# Patient Record
Sex: MALE | Race: WHITE | Employment: OTHER | URBAN - NONMETROPOLITAN AREA
[De-identification: names, ages, dates, MRNs, and addresses within clinical notes are randomized per-mention and may not be internally consistent; named-entity substitution may affect disease eponyms.]

---

## 2017-01-04 RX ORDER — TRAZODONE HYDROCHLORIDE 100 MG/1
TABLET ORAL
Qty: 30 TABLET | Refills: 1 | Status: SHIPPED | OUTPATIENT
Start: 2017-01-04 | End: 2017-02-20

## 2017-02-20 ENCOUNTER — LAB ENCOUNTER (OUTPATIENT)
Dept: LAB | Age: 52
End: 2017-02-20
Attending: FAMILY MEDICINE
Payer: COMMERCIAL

## 2017-02-20 ENCOUNTER — OFFICE VISIT (OUTPATIENT)
Dept: FAMILY MEDICINE CLINIC | Facility: CLINIC | Age: 52
End: 2017-02-20

## 2017-02-20 VITALS
SYSTOLIC BLOOD PRESSURE: 138 MMHG | OXYGEN SATURATION: 98 % | TEMPERATURE: 98 F | WEIGHT: 254 LBS | DIASTOLIC BLOOD PRESSURE: 80 MMHG | HEART RATE: 76 BPM | HEIGHT: 72.5 IN | BODY MASS INDEX: 34.03 KG/M2

## 2017-02-20 DIAGNOSIS — Z00.00 LABORATORY EXAM ORDERED AS PART OF ROUTINE GENERAL MEDICAL EXAMINATION: ICD-10-CM

## 2017-02-20 DIAGNOSIS — G47.26 SHIFT WORK SLEEP DISORDER: ICD-10-CM

## 2017-02-20 DIAGNOSIS — Z00.00 ROUTINE GENERAL MEDICAL EXAMINATION AT A HEALTH CARE FACILITY: Primary | ICD-10-CM

## 2017-02-20 DIAGNOSIS — Z13.0 SCREENING, ANEMIA, DEFICIENCY, IRON: ICD-10-CM

## 2017-02-20 DIAGNOSIS — Z12.11 SCREEN FOR COLON CANCER: ICD-10-CM

## 2017-02-20 DIAGNOSIS — Z13.1 SCREENING FOR DIABETES MELLITUS: ICD-10-CM

## 2017-02-20 DIAGNOSIS — R73.09 ELEVATED GLUCOSE LEVEL: ICD-10-CM

## 2017-02-20 DIAGNOSIS — Z13.220 SCREENING, LIPID: ICD-10-CM

## 2017-02-20 DIAGNOSIS — I10 ESSENTIAL HYPERTENSION: ICD-10-CM

## 2017-02-20 DIAGNOSIS — N48.6 PEYRONIE'S SYNDROME: ICD-10-CM

## 2017-02-20 DIAGNOSIS — E66.9 OBESITY (BMI 30.0-34.9): ICD-10-CM

## 2017-02-20 PROBLEM — E66.811 OBESITY (BMI 30.0-34.9): Status: ACTIVE | Noted: 2017-02-20

## 2017-02-20 LAB
25-HYDROXYVITAMIN D (TOTAL): 14.2 NG/ML (ref 30–100)
ALBUMIN SERPL-MCNC: 4.3 G/DL (ref 3.5–4.8)
ALP LIVER SERPL-CCNC: 76 U/L (ref 45–117)
ALT SERPL-CCNC: 34 U/L (ref 17–63)
AST SERPL-CCNC: 19 U/L (ref 15–41)
BASOPHILS # BLD AUTO: 0.05 X10(3) UL (ref 0–0.1)
BASOPHILS NFR BLD AUTO: 0.8 %
BILIRUB SERPL-MCNC: 0.6 MG/DL (ref 0.1–2)
BUN BLD-MCNC: 16 MG/DL (ref 8–20)
CALCIUM BLD-MCNC: 9.7 MG/DL (ref 8.3–10.3)
CHLORIDE: 102 MMOL/L (ref 101–111)
CHOLEST SMN-MCNC: 191 MG/DL (ref ?–200)
CO2: 31 MMOL/L (ref 22–32)
CREAT BLD-MCNC: 0.92 MG/DL (ref 0.7–1.3)
EOSINOPHIL # BLD AUTO: 0.49 X10(3) UL (ref 0–0.3)
EOSINOPHIL NFR BLD AUTO: 7.7 %
ERYTHROCYTE [DISTWIDTH] IN BLOOD BY AUTOMATED COUNT: 12.3 % (ref 11.5–16)
GLUCOSE BLD-MCNC: 123 MG/DL (ref 70–99)
HCT VFR BLD AUTO: 45.4 % (ref 37–53)
HDLC SERPL-MCNC: 55 MG/DL (ref 45–?)
HDLC SERPL: 3.47 {RATIO} (ref ?–4.97)
HGB BLD-MCNC: 15.3 G/DL (ref 13–17)
IMMATURE GRANULOCYTE COUNT: 0.02 X10(3) UL (ref 0–1)
IMMATURE GRANULOCYTE RATIO %: 0.3 %
LDLC SERPL CALC-MCNC: 96 MG/DL (ref ?–130)
LYMPHOCYTES # BLD AUTO: 1.69 X10(3) UL (ref 0.9–4)
LYMPHOCYTES NFR BLD AUTO: 26.4 %
M PROTEIN MFR SERPL ELPH: 7.5 G/DL (ref 6.1–8.3)
MCH RBC QN AUTO: 29.1 PG (ref 27–33.2)
MCHC RBC AUTO-ENTMCNC: 33.7 G/DL (ref 31–37)
MCV RBC AUTO: 86.5 FL (ref 80–99)
MONOCYTES # BLD AUTO: 0.56 X10(3) UL (ref 0.1–0.6)
MONOCYTES NFR BLD AUTO: 8.8 %
NEUTROPHIL ABS PRELIM: 3.59 X10 (3) UL (ref 1.3–6.7)
NEUTROPHILS # BLD AUTO: 3.59 X10(3) UL (ref 1.3–6.7)
NEUTROPHILS NFR BLD AUTO: 56 %
NONHDLC SERPL-MCNC: 136 MG/DL (ref ?–130)
PLATELET # BLD AUTO: 184 10(3)UL (ref 150–450)
POTASSIUM SERPL-SCNC: 3.6 MMOL/L (ref 3.6–5.1)
RBC # BLD AUTO: 5.25 X10(6)UL (ref 4.3–5.7)
RED CELL DISTRIBUTION WIDTH-SD: 38.7 FL (ref 35.1–46.3)
SODIUM SERPL-SCNC: 140 MMOL/L (ref 136–144)
TRIGLYCERIDES: 198 MG/DL (ref ?–150)
VLDL: 40 MG/DL (ref 5–40)
WBC # BLD AUTO: 6.4 X10(3) UL (ref 4–13)

## 2017-02-20 PROCEDURE — 83036 HEMOGLOBIN GLYCOSYLATED A1C: CPT

## 2017-02-20 PROCEDURE — 36415 COLL VENOUS BLD VENIPUNCTURE: CPT

## 2017-02-20 PROCEDURE — 82306 VITAMIN D 25 HYDROXY: CPT

## 2017-02-20 PROCEDURE — 85025 COMPLETE CBC W/AUTO DIFF WBC: CPT

## 2017-02-20 PROCEDURE — 80053 COMPREHEN METABOLIC PANEL: CPT

## 2017-02-20 PROCEDURE — 99396 PREV VISIT EST AGE 40-64: CPT | Performed by: FAMILY MEDICINE

## 2017-02-20 PROCEDURE — 80061 LIPID PANEL: CPT

## 2017-02-20 RX ORDER — TRAZODONE HYDROCHLORIDE 100 MG/1
100 TABLET ORAL NIGHTLY
Qty: 30 TABLET | Refills: 5 | Status: SHIPPED | OUTPATIENT
Start: 2017-02-20 | End: 2017-08-01

## 2017-02-20 RX ORDER — LOSARTAN POTASSIUM 50 MG/1
50 TABLET ORAL DAILY
Qty: 30 TABLET | Refills: 5 | Status: SHIPPED | OUTPATIENT
Start: 2017-02-20 | End: 2017-08-17

## 2017-02-20 NOTE — PROGRESS NOTES
Marissa Lai is a 50year old male. CC:  No chief complaint on file. HPI:  Patient here for routine physical.    Patient states that he has difficulty losing weight and is aware that he needs to. He will attempt by dieting and exercise. Encounters:  02/20/17 : 254 lb  11/22/16 : 257 lb 6 oz  02/22/16 : 249 lb  06/04/15 : 265 lb 4 oz  05/29/15 : 264 lb 8 oz  03/11/15 : 267 lb      BP Readings from Last 3 Encounters:  02/20/17 : 138/80  11/22/16 : 120/80  09/09/16 : 130/80    REVIEW OF SYST significant medication side effects noted.    PLAN: will continue present medications, reviewed diet, exercise and weight control, and labs as ordered           Relevant Medications    Losartan Potassium 50 MG Oral Tab       Neurologic    Shift work sleep d Future    Screening, anemia, deficiency, iron  -     CBC With Diff; Future    Shift work sleep disorder  -     TraZODone HCl 100 MG Oral Tab; Take 1 tablet (100 mg total) by mouth nightly.           Meds & Refills for this Visit:  Signed Prescriptions Disp

## 2017-02-20 NOTE — PROGRESS NOTES
HPI:    Patient ID: Brady Solis is a 50year old male. HPI    Review of Systems         Current Outpatient Prescriptions:  Multiple Vitamins-Minerals (MULTIVITAL) Oral Tab Take 1 tablet by mouth daily.  Disp:  Rfl:    TRAZODONE  MG Oral Ta

## 2017-02-20 NOTE — ASSESSMENT & PLAN NOTE
As for his hypertension, Blood Pressure is well controlled, no significant medication side effects noted.    PLAN: will continue present medications, reviewed diet, exercise and weight control, and labs as ordered

## 2017-02-20 NOTE — PATIENT INSTRUCTIONS
SCREEN COVERAGE SCHEDULE  (If Indicated)   Lipids All Patients q5 years   Colonoscopy All Patients q10 years   FBS All Patients q1 year   Glaucoma If at high risk q1 year   PSA/exam Age > 52 Only if hi risk   EKG All Patients One at initial exam   US for A

## 2017-02-22 DIAGNOSIS — E66.9 OBESITY (BMI 30.0-34.9): ICD-10-CM

## 2017-02-22 DIAGNOSIS — I10 ESSENTIAL HYPERTENSION: ICD-10-CM

## 2017-02-22 DIAGNOSIS — E55.9 VITAMIN D DEFICIENCY: ICD-10-CM

## 2017-02-22 DIAGNOSIS — Z13.0 SCREENING FOR DEFICIENCY ANEMIA: ICD-10-CM

## 2017-02-22 DIAGNOSIS — R73.09 ELEVATED GLUCOSE LEVEL: Primary | ICD-10-CM

## 2017-02-22 LAB
EST. AVERAGE GLUCOSE BLD GHB EST-MCNC: 114 MG/DL (ref 68–126)
HBA1C MFR BLD HPLC: 5.6 % (ref ?–5.7)

## 2017-02-24 RX ORDER — TRAZODONE HYDROCHLORIDE 100 MG/1
TABLET ORAL
Qty: 30 TABLET | Refills: 0 | OUTPATIENT
Start: 2017-02-24

## 2017-03-08 ENCOUNTER — MED REC SCAN ONLY (OUTPATIENT)
Dept: FAMILY MEDICINE CLINIC | Facility: CLINIC | Age: 52
End: 2017-03-08

## 2017-03-21 RX ORDER — LOSARTAN POTASSIUM AND HYDROCHLOROTHIAZIDE 12.5; 5 MG/1; MG/1
TABLET ORAL
Qty: 30 TABLET | Refills: 0 | OUTPATIENT
Start: 2017-03-21

## 2017-03-21 NOTE — TELEPHONE ENCOUNTER
Last OV-2/20/17  Last BP-2/20/17    138/80  Last cmp-2/20/17  Last Refill of Losartan 50mg--2/20/17    #30 with 5 refills    Calling pt. Left detailed message on voicemail for pt that there is 5 refills for his losartan/hctz at the pharmacy.   I will deny

## 2017-04-04 PROBLEM — D12.6 TUBULAR ADENOMA OF COLON: Status: ACTIVE | Noted: 2017-04-04

## 2017-04-05 ENCOUNTER — MED REC SCAN ONLY (OUTPATIENT)
Dept: FAMILY MEDICINE CLINIC | Facility: CLINIC | Age: 52
End: 2017-04-05

## 2017-05-03 ENCOUNTER — OFFICE VISIT (OUTPATIENT)
Dept: FAMILY MEDICINE CLINIC | Facility: CLINIC | Age: 52
End: 2017-05-03

## 2017-05-03 VITALS
BODY MASS INDEX: 32 KG/M2 | WEIGHT: 242.25 LBS | TEMPERATURE: 97 F | SYSTOLIC BLOOD PRESSURE: 116 MMHG | DIASTOLIC BLOOD PRESSURE: 70 MMHG

## 2017-05-03 DIAGNOSIS — R06.2 WHEEZE: ICD-10-CM

## 2017-05-03 DIAGNOSIS — J01.90 ACUTE RHINOSINUSITIS: Primary | ICD-10-CM

## 2017-05-03 PROCEDURE — 99213 OFFICE O/P EST LOW 20 MIN: CPT | Performed by: FAMILY MEDICINE

## 2017-05-03 RX ORDER — ALBUTEROL SULFATE 2.5 MG/3ML
2.5 SOLUTION RESPIRATORY (INHALATION) EVERY 4 HOURS PRN
Qty: 75 ML | Refills: 3 | Status: SHIPPED | OUTPATIENT
Start: 2017-05-03 | End: 2018-10-01

## 2017-05-03 RX ORDER — FLUTICASONE PROPIONATE 250 UG/1
POWDER, METERED RESPIRATORY (INHALATION)
Refills: 0 | COMMUNITY
Start: 2017-04-17 | End: 2017-08-10

## 2017-05-03 RX ORDER — ALBUTEROL SULFATE 90 UG/1
2 AEROSOL, METERED RESPIRATORY (INHALATION) EVERY 6 HOURS PRN
Qty: 1 INHALER | Refills: 2 | Status: SHIPPED | OUTPATIENT
Start: 2017-05-03 | End: 2017-07-24

## 2017-05-03 RX ORDER — AMOXICILLIN AND CLAVULANATE POTASSIUM 875; 125 MG/1; MG/1
1 TABLET, FILM COATED ORAL 2 TIMES DAILY
Qty: 20 TABLET | Refills: 0 | Status: SHIPPED | OUTPATIENT
Start: 2017-05-03 | End: 2017-05-11

## 2017-05-03 NOTE — PROGRESS NOTES
HPI:   Terri Snellen is a 50year old male who presents for upper respiratory symptoms for  3  days.  Patient reports sore throat, congestion, dry cough, sinus pain, wheezing, OTC cold meds have not been helping, prior history of sinusitis, illness ex denies shortness of breath with exertion  CARDIOVASCULAR: denies chest pain on exertion  GI: no nausea or abdominal pain  NEURO: denies headaches    EXAM:   /70 mmHg  Temp(Src) 97.1 °F (36.2 °C) (Temporal)  Wt 242 lb 4 oz  GENERAL: well developed, we improve.     Marissa Walter M.D., FAAFP

## 2017-05-04 ENCOUNTER — TELEPHONE (OUTPATIENT)
Dept: FAMILY MEDICINE CLINIC | Facility: CLINIC | Age: 52
End: 2017-05-04

## 2017-05-09 ENCOUNTER — OFFICE VISIT (OUTPATIENT)
Dept: FAMILY MEDICINE CLINIC | Facility: CLINIC | Age: 52
End: 2017-05-09

## 2017-05-09 VITALS
DIASTOLIC BLOOD PRESSURE: 72 MMHG | HEART RATE: 70 BPM | BODY MASS INDEX: 33 KG/M2 | TEMPERATURE: 98 F | WEIGHT: 243.5 LBS | OXYGEN SATURATION: 97 % | SYSTOLIC BLOOD PRESSURE: 118 MMHG

## 2017-05-09 DIAGNOSIS — R05.9 COUGH: Primary | ICD-10-CM

## 2017-05-09 DIAGNOSIS — J01.90 ACUTE RHINOSINUSITIS: ICD-10-CM

## 2017-05-09 DIAGNOSIS — R06.2 WHEEZE: ICD-10-CM

## 2017-05-09 PROCEDURE — 99214 OFFICE O/P EST MOD 30 MIN: CPT | Performed by: FAMILY MEDICINE

## 2017-05-09 RX ORDER — PREDNISONE 20 MG/1
TABLET ORAL
Qty: 42 TABLET | Refills: 0 | Status: SHIPPED | OUTPATIENT
Start: 2017-05-09 | End: 2017-08-10 | Stop reason: ALTCHOICE

## 2017-05-09 NOTE — PROGRESS NOTES
HPI:   Jane Gee is a 50year old male who presents for upper respiratory symptoms for  6  days.  Patient reports sore throat, congestion, low grade fever, dry cough, cough is keeping pt up at night, chest pain from coughing, OTC cold meds have no Use: Yes           3.0 oz/week       5 Cans of beer per week       Comment: rare        REVIEW OF SYSTEMS:   GENERAL: feels well otherwise  SKIN: no rashes  EYES:denies blurred vision or double vision  HEENT: congested  LUNGS: denies shortness of breath wi

## 2017-05-09 NOTE — PATIENT INSTRUCTIONS
Prednisone tablets  What is this medicine? PREDNISONE (PRED ni sone) is a corticosteroid. It is commonly used to treat inflammation of the skin, joints, lungs, and other organs. Common conditions treated include asthma, allergies, and arthritis.  It is a · aminoglutethimide  · amphotericin B  · aspirin and aspirin-like medicines  · barbiturates  · certain medicines for diabetes, like glipizide or glyburide  · cholestyramine  · cholinesterase inhibitors  · cyclosporine  · digoxin  · diuretics  · ephedrine This medicine may increase your risk of getting an infection. Tell your doctor or health care professional if you are around anyone with measles or chickenpox, or if you develop sores or blisters that do not heal properly.   If you are going to have surgery

## 2017-05-10 ENCOUNTER — TELEPHONE (OUTPATIENT)
Dept: FAMILY MEDICINE CLINIC | Facility: CLINIC | Age: 52
End: 2017-05-10

## 2017-05-10 RX ORDER — SULFAMETHOXAZOLE AND TRIMETHOPRIM 800; 160 MG/1; MG/1
1 TABLET ORAL 2 TIMES DAILY
Qty: 20 TABLET | Refills: 0 | Status: SHIPPED | OUTPATIENT
Start: 2017-05-10 | End: 2017-05-11

## 2017-05-10 NOTE — TELEPHONE ENCOUNTER
These were both filled 5/3. The antibiotic for 10 days, the neb solution #75mL with 3 refills. Spoke with patient. Was given Prednisone and will run out of the antibiotic before the Prednisone is gone, so wasn't sure if he would need more.   Still has

## 2017-05-11 ENCOUNTER — HOSPITAL ENCOUNTER (OUTPATIENT)
Age: 52
Discharge: HOME | End: 2017-05-11
Attending: FAMILY MEDICINE
Payer: COMMERCIAL

## 2017-05-11 ENCOUNTER — TELEPHONE (OUTPATIENT)
Dept: FAMILY MEDICINE CLINIC | Facility: CLINIC | Age: 52
End: 2017-05-11

## 2017-05-11 VITALS
OXYGEN SATURATION: 97 % | HEART RATE: 83 BPM | RESPIRATION RATE: 18 BRPM | TEMPERATURE: 97 F | SYSTOLIC BLOOD PRESSURE: 105 MMHG | DIASTOLIC BLOOD PRESSURE: 67 MMHG

## 2017-05-11 DIAGNOSIS — J01.90 ACUTE SINUSITIS, RECURRENCE NOT SPECIFIED, UNSPECIFIED LOCATION: Primary | ICD-10-CM

## 2017-05-11 DIAGNOSIS — J20.9 ACUTE BRONCHITIS, UNSPECIFIED ORGANISM: ICD-10-CM

## 2017-05-11 DIAGNOSIS — J01.90 ACUTE RHINOSINUSITIS: Primary | ICD-10-CM

## 2017-05-11 PROCEDURE — 99204 OFFICE O/P NEW MOD 45 MIN: CPT

## 2017-05-11 PROCEDURE — 94640 AIRWAY INHALATION TREATMENT: CPT

## 2017-05-11 PROCEDURE — 99214 OFFICE O/P EST MOD 30 MIN: CPT

## 2017-05-11 PROCEDURE — 96372 THER/PROPH/DIAG INJ SC/IM: CPT

## 2017-05-11 RX ORDER — AMOXICILLIN AND CLAVULANATE POTASSIUM 875; 125 MG/1; MG/1
1 TABLET, FILM COATED ORAL 2 TIMES DAILY
Qty: 20 TABLET | Refills: 0 | Status: SHIPPED | OUTPATIENT
Start: 2017-05-11 | End: 2017-05-11 | Stop reason: ALTCHOICE

## 2017-05-11 RX ORDER — METHYLPREDNISOLONE SODIUM SUCCINATE 125 MG/2ML
125 INJECTION, POWDER, LYOPHILIZED, FOR SOLUTION INTRAMUSCULAR; INTRAVENOUS ONCE
Status: COMPLETED | OUTPATIENT
Start: 2017-05-11 | End: 2017-05-11

## 2017-05-11 RX ORDER — LEVOFLOXACIN 750 MG/1
750 TABLET ORAL DAILY
Qty: 5 TABLET | Refills: 0 | Status: SHIPPED | OUTPATIENT
Start: 2017-05-11 | End: 2017-05-16

## 2017-05-11 RX ORDER — IPRATROPIUM BROMIDE AND ALBUTEROL SULFATE 2.5; .5 MG/3ML; MG/3ML
3 SOLUTION RESPIRATORY (INHALATION) ONCE
Status: COMPLETED | OUTPATIENT
Start: 2017-05-11 | End: 2017-05-11

## 2017-05-11 NOTE — TELEPHONE ENCOUNTER
Spoke to patient. Took one dose of the Bactrim DS. Had hard time swallowing and felt flushed. Didn't take anymore doses. Advised to stay off the Bactrim. Will refill the Amox. Patient verbalizes understanding.   States he also needs new work note to i

## 2017-05-11 NOTE — ED PROVIDER NOTES
[place] [address] [facility] [place]   Patient presents with:  Cough/URI    Stated Complaint: nasal congestion, URI, dx: bronchitis    HPI    62-year-old male who presents to the clinic today with one-week history of sinus pressure needed for Wheezing. Multiple Vitamins-Minerals (MULTIVITAL) Oral Tab,  Take 1 tablet by mouth daily. Losartan Potassium 50 MG Oral Tab,  Take 1 tablet (50 mg total) by mouth daily.    TraZODone HCl 100 MG Oral Tab,  Take 1 tablet (100 mg total) by mout patent. No exudates noted. Posterior wall cobblestoning noted. Lips, mucosa, and tongue normal; teeth and gums normal  Neck: no anterior or posterior cervical adenopathy and supple, symmetrical, trachea midline  Back: symmetric, no curvature.  ROM normal

## 2017-07-24 DIAGNOSIS — R06.2 WHEEZE: ICD-10-CM

## 2017-08-01 DIAGNOSIS — G47.26 SHIFT WORK SLEEP DISORDER: ICD-10-CM

## 2017-08-01 RX ORDER — TRAZODONE HYDROCHLORIDE 100 MG/1
TABLET ORAL
Qty: 30 TABLET | Refills: 4 | Status: SHIPPED | OUTPATIENT
Start: 2017-08-01 | End: 2017-08-21

## 2017-08-10 ENCOUNTER — OFFICE VISIT (OUTPATIENT)
Dept: FAMILY MEDICINE CLINIC | Facility: CLINIC | Age: 52
End: 2017-08-10

## 2017-08-10 ENCOUNTER — TELEPHONE (OUTPATIENT)
Dept: FAMILY MEDICINE CLINIC | Facility: CLINIC | Age: 52
End: 2017-08-10

## 2017-08-10 VITALS
BODY MASS INDEX: 33 KG/M2 | HEIGHT: 73 IN | SYSTOLIC BLOOD PRESSURE: 116 MMHG | DIASTOLIC BLOOD PRESSURE: 70 MMHG | WEIGHT: 249 LBS | HEART RATE: 79 BPM | TEMPERATURE: 98 F | OXYGEN SATURATION: 98 %

## 2017-08-10 DIAGNOSIS — J01.90 ACUTE RHINOSINUSITIS: Primary | ICD-10-CM

## 2017-08-10 DIAGNOSIS — R05.9 COUGH: ICD-10-CM

## 2017-08-10 PROCEDURE — 99213 OFFICE O/P EST LOW 20 MIN: CPT | Performed by: FAMILY MEDICINE

## 2017-08-10 RX ORDER — PREDNISONE 20 MG/1
TABLET ORAL
Qty: 30 TABLET | Refills: 0 | Status: SHIPPED | OUTPATIENT
Start: 2017-08-10 | End: 2017-08-21

## 2017-08-10 RX ORDER — LEVOFLOXACIN 500 MG/1
500 TABLET, FILM COATED ORAL DAILY
Qty: 10 TABLET | Refills: 0 | Status: SHIPPED | OUTPATIENT
Start: 2017-08-10 | End: 2017-08-20

## 2017-08-10 NOTE — TELEPHONE ENCOUNTER
Stacia Carrillo for work in per Dr. Sharif Has , patient notified and scheduled.        Future Appointments  Date Time Provider Millicent Victoria   8/10/2017 9:40 AM Gleason, Emmie Runner, MD [facility] [place]

## 2017-08-10 NOTE — PROGRESS NOTES
HPI:   Errol Rivera is a 50year old male who presents for upper respiratory symptoms for  3  days.  Patient reports sore throat, congestion, dry cough, sinus pain, wheezing, OTC cold meds have not been helping, denies fever     Recent flight as well REVIEW OF SYSTEMS:   GENERAL: feels well otherwise  SKIN: no rashes  EYES:denies blurred vision or double vision  HEENT: congested  LUNGS: denies shortness of breath with exertion  Uses inhaler  CARDIOVASCULAR: denies chest pain on exertion  GI: no rodrick

## 2017-08-17 DIAGNOSIS — I10 ESSENTIAL HYPERTENSION: ICD-10-CM

## 2017-08-17 RX ORDER — LOSARTAN POTASSIUM 50 MG/1
TABLET ORAL
Qty: 30 TABLET | Refills: 5 | Status: SHIPPED | OUTPATIENT
Start: 2017-08-17 | End: 2017-08-21

## 2017-08-21 ENCOUNTER — APPOINTMENT (OUTPATIENT)
Dept: CT IMAGING | Age: 52
End: 2017-08-21
Attending: FAMILY MEDICINE
Payer: COMMERCIAL

## 2017-08-21 ENCOUNTER — HOSPITAL ENCOUNTER (OUTPATIENT)
Age: 52
Discharge: ANOTHER HOSPITAL | End: 2017-08-21
Attending: FAMILY MEDICINE
Payer: COMMERCIAL

## 2017-08-21 ENCOUNTER — HOSPITAL ENCOUNTER (INPATIENT)
Facility: HOSPITAL | Age: 52
LOS: 4 days | Discharge: HOME | DRG: 392 | End: 2017-08-25
Attending: EMERGENCY MEDICINE | Admitting: HOSPITALIST
Payer: COMMERCIAL

## 2017-08-21 VITALS
BODY MASS INDEX: 33.13 KG/M2 | WEIGHT: 250 LBS | HEART RATE: 89 BPM | HEIGHT: 73 IN | TEMPERATURE: 101 F | OXYGEN SATURATION: 99 % | RESPIRATION RATE: 18 BRPM | SYSTOLIC BLOOD PRESSURE: 145 MMHG | DIASTOLIC BLOOD PRESSURE: 73 MMHG

## 2017-08-21 DIAGNOSIS — K57.20 DIVERTICULITIS OF LARGE INTESTINE WITH PERFORATION WITHOUT BLEEDING: Primary | ICD-10-CM

## 2017-08-21 DIAGNOSIS — K57.92 ACUTE DIVERTICULITIS: Primary | ICD-10-CM

## 2017-08-21 LAB
#LYMPHOCYTE IC: 1.5 X10ˆ3/UL (ref 0.9–3.2)
#MXD IC: 1.5 X10ˆ3/UL (ref 0.1–1)
#NEUTROPHIL IC: 16.2 X10ˆ3/UL (ref 1.3–6.7)
CREAT SERPL-MCNC: 0.9 MG/DL (ref 0.7–1.2)
GLUCOSE BLD-MCNC: 133 MG/DL (ref 65–99)
HCT IC: 45.8 % (ref 37–54)
HGB IC: 15.4 G/DL (ref 13–17)
ISTAT BLOOD GAS TCO2: 27 MMOL/L (ref 22–32)
ISTAT BUN: 10 MG/DL (ref 8–20)
ISTAT CHLORIDE: 98 MMOL/L (ref 101–111)
ISTAT HEMATOCRIT: 46 % (ref 37–54)
ISTAT IONIZED CALCIUM: 1.14 MMOL/L (ref 1.12–1.32)
ISTAT POTASSIUM: 3.9 MMOL/L (ref 3.6–5.1)
ISTAT SODIUM: 137 MMOL/L (ref 136–144)
LYMPHOCYTES NFR BLD AUTO: 7.9 %
MCH IC: 28.9 PG (ref 27–33.2)
MCHC IC: 33.6 G/DL (ref 31–37)
MCV IC: 86.1 FL (ref 80–99)
MIXED CELL %: 7.7 %
NEUTROPHILS NFR BLD AUTO: 84.4 %
PLT IC: 192 X10ˆ3/UL (ref 150–450)
RBC IC: 5.32 X10ˆ6/UL (ref 4.3–5.7)
WBC IC: 19.2 X10ˆ3/UL (ref 4–13)

## 2017-08-21 PROCEDURE — 99215 OFFICE O/P EST HI 40 MIN: CPT

## 2017-08-21 PROCEDURE — 96360 HYDRATION IV INFUSION INIT: CPT

## 2017-08-21 PROCEDURE — 74177 CT ABD & PELVIS W/CONTRAST: CPT | Performed by: FAMILY MEDICINE

## 2017-08-21 PROCEDURE — 85025 COMPLETE CBC W/AUTO DIFF WBC: CPT | Performed by: FAMILY MEDICINE

## 2017-08-21 PROCEDURE — 81002 URINALYSIS NONAUTO W/O SCOPE: CPT | Performed by: FAMILY MEDICINE

## 2017-08-21 PROCEDURE — 80047 BASIC METABLC PNL IONIZED CA: CPT

## 2017-08-21 PROCEDURE — 99223 1ST HOSP IP/OBS HIGH 75: CPT | Performed by: HOSPITALIST

## 2017-08-21 RX ORDER — HYDROMORPHONE HYDROCHLORIDE 1 MG/ML
INJECTION, SOLUTION INTRAMUSCULAR; INTRAVENOUS; SUBCUTANEOUS
Status: COMPLETED
Start: 2017-08-21 | End: 2017-08-21

## 2017-08-21 RX ORDER — HYDROMORPHONE HYDROCHLORIDE 1 MG/ML
0.2 INJECTION, SOLUTION INTRAMUSCULAR; INTRAVENOUS; SUBCUTANEOUS EVERY 2 HOUR PRN
Status: DISCONTINUED | OUTPATIENT
Start: 2017-08-21 | End: 2017-08-25

## 2017-08-21 RX ORDER — TRAZODONE HYDROCHLORIDE 100 MG/1
100 TABLET ORAL NIGHTLY
COMMUNITY
End: 2018-02-01

## 2017-08-21 RX ORDER — FAMOTIDINE 10 MG/ML
20 INJECTION, SOLUTION INTRAVENOUS 2 TIMES DAILY
Status: DISCONTINUED | OUTPATIENT
Start: 2017-08-21 | End: 2017-08-25

## 2017-08-21 RX ORDER — CHLORAL HYDRATE 500 MG
1000 CAPSULE ORAL DAILY
Status: ON HOLD | COMMUNITY
End: 2017-08-25

## 2017-08-21 RX ORDER — ONDANSETRON 2 MG/ML
4 INJECTION INTRAMUSCULAR; INTRAVENOUS EVERY 6 HOURS PRN
Status: DISCONTINUED | OUTPATIENT
Start: 2017-08-21 | End: 2017-08-25

## 2017-08-21 RX ORDER — SODIUM CHLORIDE 9 MG/ML
1000 INJECTION, SOLUTION INTRAVENOUS ONCE
Status: COMPLETED | OUTPATIENT
Start: 2017-08-21 | End: 2017-08-21

## 2017-08-21 RX ORDER — HYDROMORPHONE HYDROCHLORIDE 1 MG/ML
0.8 INJECTION, SOLUTION INTRAMUSCULAR; INTRAVENOUS; SUBCUTANEOUS EVERY 2 HOUR PRN
Status: DISCONTINUED | OUTPATIENT
Start: 2017-08-21 | End: 2017-08-25

## 2017-08-21 RX ORDER — SODIUM CHLORIDE 9 MG/ML
INJECTION, SOLUTION INTRAVENOUS CONTINUOUS
Status: ACTIVE | OUTPATIENT
Start: 2017-08-21 | End: 2017-08-21

## 2017-08-21 RX ORDER — ONDANSETRON 2 MG/ML
4 INJECTION INTRAMUSCULAR; INTRAVENOUS EVERY 4 HOURS PRN
Status: DISCONTINUED | OUTPATIENT
Start: 2017-08-21 | End: 2017-08-21

## 2017-08-21 RX ORDER — HYDROMORPHONE HYDROCHLORIDE 1 MG/ML
0.5 INJECTION, SOLUTION INTRAMUSCULAR; INTRAVENOUS; SUBCUTANEOUS EVERY 30 MIN PRN
Status: ACTIVE | OUTPATIENT
Start: 2017-08-21 | End: 2017-08-21

## 2017-08-21 RX ORDER — LOSARTAN POTASSIUM 50 MG/1
50 TABLET ORAL DAILY
Status: ON HOLD | COMMUNITY
End: 2017-08-25

## 2017-08-21 RX ORDER — HYDROMORPHONE HYDROCHLORIDE 1 MG/ML
1 INJECTION, SOLUTION INTRAMUSCULAR; INTRAVENOUS; SUBCUTANEOUS EVERY 2 HOUR PRN
Status: DISCONTINUED | OUTPATIENT
Start: 2017-08-21 | End: 2017-08-21

## 2017-08-21 RX ORDER — HYDROMORPHONE HYDROCHLORIDE 1 MG/ML
0.5 INJECTION, SOLUTION INTRAMUSCULAR; INTRAVENOUS; SUBCUTANEOUS EVERY 2 HOUR PRN
Status: DISCONTINUED | OUTPATIENT
Start: 2017-08-21 | End: 2017-08-21

## 2017-08-21 RX ORDER — METRONIDAZOLE 500 MG/100ML
500 INJECTION, SOLUTION INTRAVENOUS EVERY 8 HOURS
Status: DISCONTINUED | OUTPATIENT
Start: 2017-08-21 | End: 2017-08-23

## 2017-08-21 RX ORDER — HYDROMORPHONE HYDROCHLORIDE 1 MG/ML
0.4 INJECTION, SOLUTION INTRAMUSCULAR; INTRAVENOUS; SUBCUTANEOUS EVERY 2 HOUR PRN
Status: DISCONTINUED | OUTPATIENT
Start: 2017-08-21 | End: 2017-08-25

## 2017-08-21 RX ORDER — SODIUM CHLORIDE 9 MG/ML
INJECTION, SOLUTION INTRAVENOUS CONTINUOUS
Status: DISCONTINUED | OUTPATIENT
Start: 2017-08-21 | End: 2017-08-25

## 2017-08-21 RX ORDER — HYDROMORPHONE HYDROCHLORIDE 1 MG/ML
1 INJECTION, SOLUTION INTRAMUSCULAR; INTRAVENOUS; SUBCUTANEOUS ONCE
Status: COMPLETED | OUTPATIENT
Start: 2017-08-21 | End: 2017-08-21

## 2017-08-21 RX ORDER — ONDANSETRON 2 MG/ML
4 INJECTION INTRAMUSCULAR; INTRAVENOUS EVERY 6 HOURS PRN
Status: DISCONTINUED | OUTPATIENT
Start: 2017-08-21 | End: 2017-08-21

## 2017-08-21 RX ORDER — HYDRALAZINE HYDROCHLORIDE 20 MG/ML
10 INJECTION INTRAMUSCULAR; INTRAVENOUS EVERY 6 HOURS PRN
Status: DISCONTINUED | OUTPATIENT
Start: 2017-08-21 | End: 2017-08-25

## 2017-08-21 NOTE — PROGRESS NOTES
Pharmacy Note:   Antimicrobial Weight Dose Adjustment for: German    Connie Acuña has been prescribed Zosyn 3.375 g IV x 1 in the ER. CrCl cannot be calculated (Patient's most recent lab result is older than the maximum 7 days allowed. ).

## 2017-08-21 NOTE — CONSULTS
[facility]  Report of Consultation    Arline Favorite Patient Status:  Emergency    2/15/1965 MRN [id number]   Location [address] Attending Erinn Ha MD   [facility] Day # 0 PCP Mack Ruth MD     Reason for [facility] History:   Diagnosis Date   • HTN (hypertension)      Past Surgical History:  No date: OTHER SURGICAL HISTORY      Comment: LEFT KNEE ACL RECONSTRUCTION  No date: TONSILLECTOMY  Family History   Problem Relation Age of Onset   • Cancer Paternal Grandmother wheezing    Physical Exam:  Blood pressure 118/72, pulse 92, temperature 99.3 °F (37.4 °C), resp. rate 18, height 177.8 cm (5' 10\"), weight 245 lb (111.1 kg), SpO2 95 %. General: Alert, orientated x3. Cooperative. No apparent distress.     HEENT: With nausea or vomiting and states last bowel movement was 2 days ago and it was small. CONTRAST USED:  100cc of Omnipaque 350     FINDINGS:    LIVER:  Normal.  No enlargement, atrophy, abnormal density, or significant focal lesion.     BILIARY:  Normal. fluid. Dictated by: Pietro Luevano MD on 8/21/2017 at 10:00       Approved by: Pietro Luevano MD                Impression:  Patient Active Problem List:     HTN (hypertension)     Shift work sleep disorder     Obesity (BMI 30.0-34. 9)     Tu n.p.o. with IV fluids and IV antibiotics. 2. If he clinically worsens, with increasing abdominal pain, fevers, or increasing white count, he may require emergent Tamayo's procedure with temporary colostomy. This was discussed with him at the bedside.   3

## 2017-08-21 NOTE — ED INITIAL ASSESSMENT (HPI)
Pt here for abdominal pain and was seen at immediate care where they performed imaging that found a bowel perforation.

## 2017-08-21 NOTE — ED PROVIDER NOTES
Patient Seen in: [facility] Emergency Department    History   Patient presents with:  Abdomen/Flank Pain (GI/)    Stated Complaint: abd pain    HPI    82-year-old with a history of hypertension presents for evaluation of low abdominal pain.   It st Smokeless tobacco: Never Used                      Alcohol use: Yes           3.0 oz/week     Cans of beer: 5 per week     Comment: rare      Review of Systems    Positive for stated complaint: abd pain  Other systems are as noted in HPI.   Constitutional a ISTAT Chloride 101 - 111 mmol/L 98    ISTAT Ionized Calcium 1.12 - 1.32 mmol/L 1.14   ISTAT Creatinine 0.7 - 1.2 mg/dL 0.9   ISTAT Hematocrit 37 - 54 % 46   ISTAT Glucose 65 - 99 mg/dL 133    ISTAT Blood Gas TCO2 22 - 32 mmol/L 27       CT abdomen and pelv

## 2017-08-21 NOTE — H&P
[facility]  History and Physical     Theta Zainab Patient Status:  Emergency    2/15/1965 MRN [id number]   Location [address] [facility] Attending Marvin Wheeler MD   Hosp Day # 0 PCP Linnette Carmona MD     Chief Complai 1 tablet by mouth daily. Disp:  Rfl:        Review of Systems:   A comprehensive 14 point review of systems was completed. Pertinent positives and negatives noted in the HPI.     Physical Exam:    /72   Pulse 92   Temp 99.3 °F (37.4 °C)   Resp 18 bedside.     Mir Rudolph, DO  8/21/2017

## 2017-08-21 NOTE — ED NOTES
Phoned [facility] for transport to [facility] in [place]. ETA of 15 minutes. Updated patient and patient's family. Will continue to monitor.

## 2017-08-21 NOTE — PROGRESS NOTES
Pharmacy Note:   Antimicrobial Weight Dose Adjustment for: Zosyqiana Gaspar has been prescribed Zosyn 3.375 g IV every 8 hrs.               Weight = 111.1 kg          BMI = 35.15    Per P&T approved dose adjustment protocol for weight and renal

## 2017-08-21 NOTE — ED PROVIDER NOTES
Patient Seen in: [address] [facility] [place]    History   Patient presents with:  Abdominal Pain  Back Pain  Nausea    Stated Complaint: LOWER AB PAIN X 2 DAYS    HPI    68-year-old male presents to the clinic today with chief complaints of lower abd Packs/day: 0.00      Years: 0.00         Quit date: 2/11/2015  Smokeless tobacco: Never Used                      Alcohol use: Yes           3.0 oz/week     Cans of beer: 5 per week     Comment: rare cyanosis, clubbing or edema  NEURO: Oriented times three, cranial nerves are intact, motor and sensory are grossly intact      ED Course     Labs Reviewed   POCT CBC - Abnormal; Notable for the following:        Result Value    WBC IC 19.2 (*)     # Neutro diverticulitis. There is marked wall thickening which is circumferential. There is a small amount of extraluminal air within the sigmoid mesentery, consistent with microperforation without evidence of abscess.  Trace amount of inflammatory changes noted in antibiotics and surgical consult. Since patient is in significant pain recommend going by ambulance. Patient accepted.   Ambulance will be called and patient was transported to [facility] ER for further treatment        Disposition and Plan     Clini

## 2017-08-21 NOTE — ED INITIAL ASSESSMENT (HPI)
Patient states he began having sharp low abd pain that started on Sunday. Nausea today. Denies vomiting and diarrhea. Last BM was yesterday.

## 2017-08-21 NOTE — ED NOTES
[facility] Paramedics here to transport patient. Report given. Will call [facility] with report. Patient left in stable condition.

## 2017-08-21 NOTE — PROGRESS NOTES
NURSING ADMISSION NOTE      Patient admitted via Cart  Oriented to room. Safety precautions initiated. Bed in low position. Call light in reach. Received pt from ER. Pt in severe abdominal pain-pain medication administered. Abdomen tender. OLIVER Winchester

## 2017-08-22 LAB
BASOPHILS # BLD AUTO: 0.04 X10(3) UL (ref 0–0.1)
BASOPHILS NFR BLD AUTO: 0.2 %
BUN BLD-MCNC: 10 MG/DL (ref 8–20)
CALCIUM BLD-MCNC: 9.2 MG/DL (ref 8.3–10.3)
CHLORIDE: 104 MMOL/L (ref 101–111)
CO2: 26 MMOL/L (ref 22–32)
CREAT BLD-MCNC: 0.68 MG/DL (ref 0.7–1.3)
EOSINOPHIL # BLD AUTO: 0.15 X10(3) UL (ref 0–0.3)
EOSINOPHIL NFR BLD AUTO: 0.9 %
ERYTHROCYTE [DISTWIDTH] IN BLOOD BY AUTOMATED COUNT: 12.3 % (ref 11.5–16)
GLUCOSE BLD-MCNC: 100 MG/DL (ref 70–99)
HCT VFR BLD AUTO: 43.1 % (ref 37–53)
HGB BLD-MCNC: 14.1 G/DL (ref 13–17)
IMMATURE GRANULOCYTE COUNT: 0.13 X10(3) UL (ref 0–1)
IMMATURE GRANULOCYTE RATIO %: 0.8 %
LYMPHOCYTES # BLD AUTO: 1.18 X10(3) UL (ref 0.9–4)
LYMPHOCYTES NFR BLD AUTO: 7.2 %
MCH RBC QN AUTO: 28.3 PG (ref 27–33.2)
MCHC RBC AUTO-ENTMCNC: 32.7 G/DL (ref 31–37)
MCV RBC AUTO: 86.5 FL (ref 80–99)
MONOCYTES # BLD AUTO: 1.08 X10(3) UL (ref 0.1–0.6)
MONOCYTES NFR BLD AUTO: 6.6 %
NEUTROPHIL ABS PRELIM: 13.86 X10 (3) UL (ref 1.3–6.7)
NEUTROPHILS # BLD AUTO: 13.86 X10(3) UL (ref 1.3–6.7)
NEUTROPHILS NFR BLD AUTO: 84.3 %
PLATELET # BLD AUTO: 151 10(3)UL (ref 150–450)
POTASSIUM SERPL-SCNC: 4.2 MMOL/L (ref 3.6–5.1)
RBC # BLD AUTO: 4.98 X10(6)UL (ref 4.3–5.7)
RED CELL DISTRIBUTION WIDTH-SD: 39 FL (ref 35.1–46.3)
SODIUM SERPL-SCNC: 137 MMOL/L (ref 136–144)
WBC # BLD AUTO: 16.4 X10(3) UL (ref 4–13)

## 2017-08-22 PROCEDURE — 99232 SBSQ HOSP IP/OBS MODERATE 35: CPT | Performed by: HOSPITALIST

## 2017-08-22 NOTE — PROGRESS NOTES
[facility]  Progress Note     Coit Leavens Patient Status:  Inpatient    2/15/1965 MRN [id number]   [facility] 5NW-A Attending Brooks [id number] Day # 1 PCP Sunita Pizano MD     Chief Complaint: Abdominal pain General surgery on consult. IV pain meds. Continue IV antiemetics. 2. Hypertension- Will hold oral meds for now. Will order prn hydralazine. Plan of care: As above.     Quality:  · DVT Prophylaxis: SCD's  · CODE status: Full  · Guerrero: N/A  · Central l

## 2017-08-22 NOTE — PAYOR COMM NOTE
--------------  ADMISSION REVIEW     Payor: JANES [address] #:  [id number]  Authorization Number: [id number]/A    Admit date: 8/21/17  Admit time: 46       Admitting Physician: Erik Black MD  Attending Physician:  Viviana Canales ISTAT Hematocrit 37 - 54 % 46   ISTAT Glucose 65 - 99 mg/dL 133    ISTAT Blood Gas TCO2 22 - 32 mmol/L 27       CT abdomen and pelvis: 7 cm segment of acute diverticulitis with microperforation no abscess    Disposition and Plan     Clinical Impression:[KF 8/21/2017 1143 New Bag 4.5 g Intravenous Paul Butler RN      Piperacillin Sod-Tazobactam So (ZOSYN) 4.5 g in dextrose 5 % 100 mL IVPB     Date Action Dose Route User    8/22/2017 0245 New Bag 4.5 g Intravenous Fidelina Yanez RN    8/21/2017 3279

## 2017-08-22 NOTE — PROGRESS NOTES
Multidisciplinary Discharge Rounds held 8/22/2017. Treatment team members present today include , , Charge Nurse,  Nurse, RT, PT and Pharmacy caring for [employer].      Other care providers present:    Patient Active Pr

## 2017-08-22 NOTE — PLAN OF CARE
GASTROINTESTINAL - ADULT    • Minimal or absence of nausea and vomiting Progressing        PAIN - ADULT    • Verbalizes/displays adequate comfort level or patient's stated pain goal Progressing        Patient/Family Goals    • Patient/Family Nani Dupont

## 2017-08-22 NOTE — PROGRESS NOTES
[facility]  Progress Note    Sharmin Call Patient Status:  Inpatient    2/15/1965 MRN [id number]   [facility] 5NW-A Attending Rayna Wright[address] Day # 1 PCP Bushra Pizano MD     Subjective:  Pt resting in bed, repo PA  8/22/2017  10:02 AM    ADDENDUM:     Patient was seen and examined. Pt with continued LLQ and RLQ pain. Feels a little better than last night. He is not hungry. General: Alert, orientated x3. Cooperative. No apparent distress.   Abdomen: Soft, non

## 2017-08-23 PROCEDURE — 99232 SBSQ HOSP IP/OBS MODERATE 35: CPT | Performed by: HOSPITALIST

## 2017-08-23 RX ORDER — ACETAMINOPHEN 325 MG/1
650 TABLET ORAL EVERY 6 HOURS PRN
Status: DISCONTINUED | OUTPATIENT
Start: 2017-08-23 | End: 2017-08-25

## 2017-08-23 RX ORDER — ENOXAPARIN SODIUM 100 MG/ML
40 INJECTION SUBCUTANEOUS DAILY
Status: DISCONTINUED | OUTPATIENT
Start: 2017-08-23 | End: 2017-08-25

## 2017-08-23 NOTE — PROGRESS NOTES
[facility]  Progress Note     Johan Goldman Patient Status:  Inpatient    2/15/1965 MRN [id number]   [facility] 5NW-A Attending Sánchez Anderson MD   Hosp Day # 2 PCP Barrera Schneider MD     Chief Complaint: abd pain    S: Patient this point Mr. Martha Pereyra is expected to be discharge to: home    Plan of care discussed with patient or family at bedside.     Timo Powell MD

## 2017-08-23 NOTE — PROGRESS NOTES
[facility]  Progress Note    Patsysachin Vicente Patient Status:  Inpatient    2/15/1965 MRN [id number]   [facility] 5NW-A Attending Amy [address] Day # 2 PCP Ozzie Pizano MD     Subjective:  Pt resting in bed, pt f some cold sweats today. Temperature was normal.    General: Alert, orientated x3. Cooperative. No apparent distress. Abdomen: Soft, non-distended,+ LLQ/RLQ tenderness, with no rebound or guarding. No peritoneal signs. A/P Diverticulitis    1.  Cont IV

## 2017-08-23 NOTE — CM/SW NOTE
08/23/17 1400   CM/SW Screening   Referral Source Social Work (self-referral)   Information Source Chart review;Nursing rounds   Patient's Mental Status Alert;Oriented   Patient lives with Spouse   Patient Status Prior to Admission   Independent with AD

## 2017-08-24 LAB
BASOPHILS # BLD AUTO: 0.04 X10(3) UL (ref 0–0.1)
BASOPHILS NFR BLD AUTO: 0.4 %
EOSINOPHIL # BLD AUTO: 0.33 X10(3) UL (ref 0–0.3)
EOSINOPHIL NFR BLD AUTO: 3.1 %
ERYTHROCYTE [DISTWIDTH] IN BLOOD BY AUTOMATED COUNT: 12.1 % (ref 11.5–16)
HCT VFR BLD AUTO: 37.3 % (ref 37–53)
HGB BLD-MCNC: 12.5 G/DL (ref 13–17)
IMMATURE GRANULOCYTE COUNT: 0.06 X10(3) UL (ref 0–1)
IMMATURE GRANULOCYTE RATIO %: 0.6 %
LYMPHOCYTES # BLD AUTO: 1.12 X10(3) UL (ref 0.9–4)
LYMPHOCYTES NFR BLD AUTO: 10.4 %
MCH RBC QN AUTO: 28.7 PG (ref 27–33.2)
MCHC RBC AUTO-ENTMCNC: 33.5 G/DL (ref 31–37)
MCV RBC AUTO: 85.6 FL (ref 80–99)
MONOCYTES # BLD AUTO: 0.91 X10(3) UL (ref 0.1–0.6)
MONOCYTES NFR BLD AUTO: 8.4 %
NEUTROPHIL ABS PRELIM: 8.31 X10 (3) UL (ref 1.3–6.7)
NEUTROPHILS # BLD AUTO: 8.31 X10(3) UL (ref 1.3–6.7)
NEUTROPHILS NFR BLD AUTO: 77.1 %
PLATELET # BLD AUTO: 169 10(3)UL (ref 150–450)
RBC # BLD AUTO: 4.36 X10(6)UL (ref 4.3–5.7)
RED CELL DISTRIBUTION WIDTH-SD: 37.8 FL (ref 35.1–46.3)
WBC # BLD AUTO: 10.8 X10(3) UL (ref 4–13)

## 2017-08-24 PROCEDURE — 99232 SBSQ HOSP IP/OBS MODERATE 35: CPT | Performed by: HOSPITALIST

## 2017-08-24 RX ORDER — HYDROCODONE BITARTRATE AND ACETAMINOPHEN 5; 325 MG/1; MG/1
2 TABLET ORAL EVERY 4 HOURS PRN
Status: DISCONTINUED | OUTPATIENT
Start: 2017-08-24 | End: 2017-08-25

## 2017-08-24 RX ORDER — HYDROCODONE BITARTRATE AND ACETAMINOPHEN 5; 325 MG/1; MG/1
1 TABLET ORAL EVERY 4 HOURS PRN
Status: DISCONTINUED | OUTPATIENT
Start: 2017-08-24 | End: 2017-08-25

## 2017-08-24 RX ORDER — HYDROCODONE BITARTRATE AND ACETAMINOPHEN 5; 325 MG/1; MG/1
1 TABLET ORAL EVERY 6 HOURS PRN
Status: DISCONTINUED | OUTPATIENT
Start: 2017-08-24 | End: 2017-08-24

## 2017-08-24 NOTE — PROGRESS NOTES
[facility]  Progress Note    Willistine Ceresco Patient Status:  Inpatient    2/15/1965 MRN [id number]   [facility] 5NW-A Attending [facility] Day # 3 PCP Ramona Pizano MD     Subjective:  Patient feels slightly No apparent distress. Abdomen: Soft, non-distended,+ RLQ tenderness (LLQ tenderness improved), with no rebound or guarding. No peritoneal signs. A/P Diverticulitis    1. Cont IV anbx. 2. Will cont clears until pain decreases further.   3. DVT prophylax

## 2017-08-24 NOTE — PROGRESS NOTES
[facility]  Progress Note     Marco A Pretty Patient Status:  Inpatient    2/15/1965 MRN [id number]   [facility] 5NW-A Attending Anastacio Ochao MD   Hosp Day # 3 PCP Tonya Munoz MD     Chief Complaint: abd pain    S: Patient no  · Central line: no    Estimated date of discharge: TBD  Discharge is dependent on: clinical stability  At this point Mr. Nicolás Suresh is expected to be discharge to: home    Plan of care discussed with patient or family at bedside.     Flory West MD

## 2017-08-24 NOTE — PAYOR COMM NOTE
--------------  CONTINUED STAY REVIEW    Payor: [facility]    8/23  CONTS TO HAVE TENDERNESS LOWER ABDOMEN       Assessment:  Acute diverticulitis with microperforation   Leukocytosis - 19k --> 16.4 yesterday   Pt reports feeling flushed but no docu

## 2017-08-25 VITALS
OXYGEN SATURATION: 95 % | RESPIRATION RATE: 18 BRPM | BODY MASS INDEX: 35.07 KG/M2 | TEMPERATURE: 99 F | WEIGHT: 245 LBS | DIASTOLIC BLOOD PRESSURE: 75 MMHG | HEIGHT: 70 IN | HEART RATE: 70 BPM | SYSTOLIC BLOOD PRESSURE: 120 MMHG

## 2017-08-25 PROCEDURE — 99239 HOSP IP/OBS DSCHRG MGMT >30: CPT | Performed by: HOSPITALIST

## 2017-08-25 RX ORDER — AMOXICILLIN AND CLAVULANATE POTASSIUM 875; 125 MG/1; MG/1
1 TABLET, FILM COATED ORAL 2 TIMES DAILY
Qty: 14 TABLET | Refills: 0 | Status: SHIPPED | OUTPATIENT
Start: 2017-08-25 | End: 2017-08-29

## 2017-08-25 RX ORDER — HYDROCODONE BITARTRATE AND ACETAMINOPHEN 5; 325 MG/1; MG/1
1-2 TABLET ORAL EVERY 6 HOURS PRN
Qty: 20 TABLET | Refills: 0 | Status: SHIPPED | OUTPATIENT
Start: 2017-08-25 | End: 2018-02-01 | Stop reason: ALTCHOICE

## 2017-08-25 NOTE — DIETARY NOTE
Nutrition Short Note    Dietitian consult received. Low fiber diet education completed.      Harman Buchanan MS, RDN, LDN  Pager [phone]

## 2017-08-25 NOTE — PROGRESS NOTES
Ok to Dc. Voiding w/o difficulty. Tolerating soft diet. DC instructions given to pt and family. Prescriptions norco and PO abx paper scripts given to pt.

## 2017-08-25 NOTE — PROGRESS NOTES
[facility]  Progress Note    Eino Never Patient Status:  Inpatient    2/15/1965 MRN [id number]   [facility] 5NW-A Attending Hector Ledbetter MD   [address] Day # 4 PCP Bao Adjutant MD Jerica     Subjective:  Pt feeling better with less pa

## 2017-08-26 NOTE — DISCHARGE SUMMARY
[facility]  DISCHARGE SUMMARY     Johan Goldman Patient Status:  Inpatient    2/15/1965 MRN [id number]   [facility] 5NW-A Attending No att. providers found   [facility] Day # 4 PCP Barrera Schneider MD     Date of Admission: 2017 antibiotics. He will follow-up with general surgery as an outpatient.   General surgery    Consultants:  • General surgery      Discharge Medication List:     Discharge Medications      START taking these medications      Instructions Prescription details auscultation bilaterally  Cardiovascular: S1, S2  Abdomen: Soft, nontender, nondistended      -----------------------------------------------------------------------------------------------  PATIENT DISCHARGE INSTRUCTIONS: See electronic chart    Kristen Carson

## 2017-08-28 ENCOUNTER — PATIENT OUTREACH (OUTPATIENT)
Dept: CASE MANAGEMENT | Age: 52
End: 2017-08-28

## 2017-08-28 DIAGNOSIS — K57.20 DIVERTICULITIS OF LARGE INTESTINE WITH PERFORATION WITHOUT BLEEDING: ICD-10-CM

## 2017-08-28 NOTE — PAYOR COMM NOTE
--------------  DISCHARGE REVIEW    Payor: JANES [address] #:  [id number]  Authorization Number: [id number]    Admit date: 8/21/17  Admit time:  1319  Discharge Date: 8/25/2017  5:45 PM     Admitting Physician: Tracy Ruelas MD  Attending [place]

## 2017-08-28 NOTE — PROGRESS NOTES
Initial Post Discharge Follow Up   Discharge Date: 8/25/17  Contact Date: 8/28/2017    Consent Verification:  Assessment Completed With: Patient  HIPAA Verified?   Yes    Discharge Dx:   Diverticulitis of large intestine with perforation without bleeding medications when you left the hospital? Yes  • May I go over your medications with you to make sure we are not missing anything? yes  • Are there any reasons that keep you from taking your medication as prescribed?  No  Are you having any concerns with const

## 2017-08-29 ENCOUNTER — TELEPHONE (OUTPATIENT)
Dept: SURGERY | Facility: CLINIC | Age: 52
End: 2017-08-29

## 2017-08-29 ENCOUNTER — TELEPHONE (OUTPATIENT)
Dept: FAMILY MEDICINE CLINIC | Facility: CLINIC | Age: 52
End: 2017-08-29

## 2017-08-29 RX ORDER — METRONIDAZOLE 500 MG/1
500 TABLET ORAL 3 TIMES DAILY
Qty: 21 TABLET | Refills: 0 | Status: SHIPPED | OUTPATIENT
Start: 2017-08-29 | End: 2017-09-05

## 2017-08-29 RX ORDER — LEVOFLOXACIN 500 MG/1
500 TABLET, FILM COATED ORAL DAILY
Qty: 7 TABLET | Refills: 0 | Status: SHIPPED | OUTPATIENT
Start: 2017-08-29 | End: 2017-09-05

## 2017-08-29 NOTE — TELEPHONE ENCOUNTER
Patient was seen by Dr. Forrest in the hospital for diverticulitis. He states he stopped Augmentin due to hives. Notified Rudy ROOT. Ordered flagyl and levaquin per Josephine Frank. Notified patient.      Patient called back and states that his PCP office jeremy

## 2017-08-29 NOTE — TELEPHONE ENCOUNTER
Patient should continue treatment for diverticulitis. Please tato Augmentin allergy  Metronidazole 500 mg 3 times daily and Levaquin 500 mg daily for an additional 7 days.   Please warned patient of absolute abstinence from alcohol while on metronidazole

## 2017-08-29 NOTE — TELEPHONE ENCOUNTER
Abril Garcia was released out of [facility], they put him on amoxiclav, he is breaking out in hives from it, can he get a different medication?

## 2017-08-29 NOTE — TELEPHONE ENCOUNTER
Patient was seen by Dr. Serenity William in the hospital for diverticulitis. He states he stopped Augmentin due to hives. Notified Mervat MURPHY Ordered flagyl 250 mg TID and levaquin 500 mg daily per Chayo Child. Notified patient.      Patient called back and states

## 2017-09-01 ENCOUNTER — TELEPHONE (OUTPATIENT)
Dept: SURGERY | Facility: CLINIC | Age: 52
End: 2017-09-01

## 2017-09-01 ENCOUNTER — TELEPHONE (OUTPATIENT)
Dept: FAMILY MEDICINE CLINIC | Facility: CLINIC | Age: 52
End: 2017-09-01

## 2017-09-01 NOTE — TELEPHONE ENCOUNTER
Advised pt to try prunes, increase fluids, increase activities and if no improvement or discomfort develops. Call the office back for further instructions.  Patient notified and verbalized understanding of the information provided

## 2017-09-01 NOTE — TELEPHONE ENCOUNTER
Patient started on Augmentin for diverticulitis and had and allergic reaction. Patient now on Levaquin and flagyl. States he has not had a bowel movement since Wednesday. He also had an episode of heartburn.  Advised patient to continue to follow the soft d

## 2017-09-01 NOTE — TELEPHONE ENCOUNTER
[facility] is wanting to take a otc med for constipation from the antibiotics (is what he is thinking), call [facility]

## 2017-09-05 ENCOUNTER — OFFICE VISIT (OUTPATIENT)
Dept: FAMILY MEDICINE CLINIC | Facility: CLINIC | Age: 52
End: 2017-09-05

## 2017-09-05 VITALS
TEMPERATURE: 98 F | DIASTOLIC BLOOD PRESSURE: 72 MMHG | HEIGHT: 73 IN | BODY MASS INDEX: 31.54 KG/M2 | WEIGHT: 238 LBS | SYSTOLIC BLOOD PRESSURE: 116 MMHG

## 2017-09-05 DIAGNOSIS — K57.92 ACUTE DIVERTICULITIS: Primary | ICD-10-CM

## 2017-09-05 DIAGNOSIS — K57.20 DIVERTICULITIS OF LARGE INTESTINE WITH PERFORATION WITHOUT BLEEDING: ICD-10-CM

## 2017-09-05 DIAGNOSIS — I10 ESSENTIAL HYPERTENSION: ICD-10-CM

## 2017-09-05 PROCEDURE — 99214 OFFICE O/P EST MOD 30 MIN: CPT | Performed by: FAMILY MEDICINE

## 2017-09-06 NOTE — PATIENT INSTRUCTIONS
Diverticulitis    Some people get pouches along the wall of the colon as they get older. The pouches, called diverticuli, usually cause no symptoms. If the pouches become blocked, you can get an infection. This infection is called diverticulitis.  It caus Once you have an episode of diverticulitis, you are at risk for having it again. After you have recovered from this episode, you may be able to lower your risk by eating a high-fiber diet (20 gm/day to 35 gm/day of fiber).  This cleans out the colon pouches © 6081-5215 [address], [address] [place]. All rights reserved. This information is not intended as a substitute for professional medical care. Always follow your healthcare professional's instructions.

## 2017-09-06 NOTE — PROGRESS NOTES
Marissa Lai is a 50year old male.   Patient presents with:  Hospital F/U: room 6      HPI:   Pt in hospital several days for  Diverticulitis  With microperf    He was better with iv abx  Leukocytosis resolved    He went home on   augmentin  But dev 09/05/17 : 238 lb  08/21/17 : 245 lb  08/21/17 : 250 lb  08/10/17 : 249 lb  05/09/17 : 243 lb 8 oz  05/03/17 : 242 lb 4 oz    RESULTS REVIEWED FROM PRIOR VISITS BY   DR Ross Allred       Discharge Summaries  Date of Service: 8/26/2017 8:16 AM  Rocio Henley    The patient was diagnosed with acute diverticulitis with microperforation. He was started on IV antibiotics, IV fluids and pain medications. He was seen by general surgery. The patient had eventual improvement in his symptoms.   His diet was eventuall    Schedule an appointment as soon as possible for a visit in 2 weeks        Jude Pizano MD  [id number]   [phone]     Schedule an appointment as soon as possible for a visit              Progress Notes  Encounter Paddy · When you were leaving the hospital were any medication changes discussed with you? yes  · If you were prescribed a new medication:   ? Was the new medication’s purpose explained? yes  ? Was the new medication’s side effects discussed? yes  ?  Do you have [x]  Discharge Summary, medications reviewed/discussed/and reconciled with patient, and orders reviewed and discussed.         Electronically signed by Anshul Pride RN at 8/28/2017 10:36 AM        Patient Outreach on 8/28/2017            REVIEW OF SYS No orders of the defined types were placed in this encounter.               Meds & Refills for this Visit:  No prescriptions requested or ordered in this encounter              Mariana Nagel M.D., FAAFP      The patient indicates understanding of these i

## 2017-09-27 ENCOUNTER — OFFICE VISIT (OUTPATIENT)
Dept: SURGERY | Facility: CLINIC | Age: 52
End: 2017-09-27

## 2017-09-27 VITALS
SYSTOLIC BLOOD PRESSURE: 124 MMHG | DIASTOLIC BLOOD PRESSURE: 81 MMHG | WEIGHT: 240 LBS | HEIGHT: 73 IN | HEART RATE: 60 BPM | BODY MASS INDEX: 31.81 KG/M2 | TEMPERATURE: 98 F

## 2017-09-27 DIAGNOSIS — K57.20 DIVERTICULITIS OF LARGE INTESTINE WITH PERFORATION WITHOUT ABSCESS OR BLEEDING: Primary | ICD-10-CM

## 2017-09-27 PROCEDURE — 99213 OFFICE O/P EST LOW 20 MIN: CPT | Performed by: SURGERY

## 2017-09-27 NOTE — PATIENT INSTRUCTIONS
HIGH FIBER DIET    This high fiber diet includes two major components. One is a natural high fiber that is dietary high. The other is a fiber supplement.     Natural Dietary Fiber:    These are very few products on the market that have high enough fiber c are simply an equivalent to a ground up apple peel. That is they just provide extra natural fiber. They will enter your colon in a non-digestable form providing bulk to your stool.   Therefore, they are safe to take on a daily basis for the rest of you li problems:    1. Most people describe a gassy or bloated feeling for the first ten days to two weeks. This will pass with time. I recommend that once you get on the diet that you stay on it and complete it as prescribed.   Again, you will enjoy normal andrew

## 2017-09-27 NOTE — H&P
New Patient Visit Note       Active Problems      1.  Diverticulitis of large intestine with perforation without abscess or bleeding        Chief Complaint   Patient presents with:  Diverticulitis: New pt seen in ER on 8/21 for Diverticulitis, CT ABD+pelvis tobacco: Never Used                        Alcohol use: Yes           3.0 oz/week       Cans of beer: 5 per week       Comment: rare    Drug use:  No                 Current Outpatient Prescriptions:  TraZODone HCl 100 MG Oral Tab Take 100 mg by mouth night scleral icterus. Neck: No JVD present. Abdominal: Soft. Normal appearance. He exhibits no shifting dullness, no distension, no pulsatile liver, no fluid wave, no abdominal bruit, no ascites, no pulsatile midline mass and no mass.  There is no hepatosple

## 2017-09-28 ENCOUNTER — TELEPHONE (OUTPATIENT)
Dept: SURGERY | Facility: CLINIC | Age: 52
End: 2017-09-28

## 2017-09-28 NOTE — TELEPHONE ENCOUNTER
Pt called. Pt states 'saw Dr. Karla Garzon yesterday for diverticulitis. Forgot to ask a couple of questions. Would like to know if can resume caffeine, fish oil vitamin and MVI.  Also since was told can eat normally, for lunch had turkey club and starting to exp

## 2017-12-07 ENCOUNTER — OFFICE VISIT (OUTPATIENT)
Dept: FAMILY MEDICINE CLINIC | Facility: CLINIC | Age: 52
End: 2017-12-07

## 2017-12-07 ENCOUNTER — LAB ENCOUNTER (OUTPATIENT)
Dept: LAB | Age: 52
End: 2017-12-07
Attending: INTERNAL MEDICINE
Payer: COMMERCIAL

## 2017-12-07 VITALS
DIASTOLIC BLOOD PRESSURE: 68 MMHG | WEIGHT: 241.5 LBS | BODY MASS INDEX: 32 KG/M2 | SYSTOLIC BLOOD PRESSURE: 116 MMHG | TEMPERATURE: 98 F

## 2017-12-07 DIAGNOSIS — R30.9 PAIN WITH URINATION: Primary | ICD-10-CM

## 2017-12-07 DIAGNOSIS — R30.9 PAIN WITH URINATION: ICD-10-CM

## 2017-12-07 PROCEDURE — 80053 COMPREHEN METABOLIC PANEL: CPT

## 2017-12-07 PROCEDURE — 87086 URINE CULTURE/COLONY COUNT: CPT | Performed by: INTERNAL MEDICINE

## 2017-12-07 PROCEDURE — 81003 URINALYSIS AUTO W/O SCOPE: CPT | Performed by: INTERNAL MEDICINE

## 2017-12-07 PROCEDURE — 99214 OFFICE O/P EST MOD 30 MIN: CPT | Performed by: INTERNAL MEDICINE

## 2017-12-07 PROCEDURE — 85025 COMPLETE CBC W/AUTO DIFF WBC: CPT

## 2017-12-07 PROCEDURE — 36415 COLL VENOUS BLD VENIPUNCTURE: CPT

## 2017-12-07 PROCEDURE — 80061 LIPID PANEL: CPT

## 2017-12-07 RX ORDER — LOSARTAN POTASSIUM 50 MG/1
TABLET ORAL
COMMUNITY
Start: 2017-11-28 | End: 2018-04-23

## 2017-12-07 NOTE — PROGRESS NOTES
Fortino Jovel is a 50year old male. HPI:   Patient presents with symptoms of UTI. Complaining of urinary frequency, urgency, dysuria, suprapubic pain. Denies back pain, fever, hematuria.   He had severe diverticulitis in Aug 2017 and spent 5 days in given on increasing fluid intake, get labs and urine sent for culture. Wait for culture results. The patient indicates understanding of these issues and agrees to the plan. The patient is asked to return in 3 days if not better.  Call if fever, vomiting

## 2017-12-08 ENCOUNTER — TELEPHONE (OUTPATIENT)
Dept: FAMILY MEDICINE CLINIC | Facility: CLINIC | Age: 52
End: 2017-12-08

## 2017-12-08 NOTE — TELEPHONE ENCOUNTER
Discussed with Dr. Rashaun Ace, advised patient it does not have a diuretic component. Verbalized understanding.

## 2017-12-08 NOTE — TELEPHONE ENCOUNTER
Losartan Potassium 50 MG Oral Tab   TOMI STARTED TAKING THIS MEDICATION AGAIN AND WANTS TO MAKE SURE THERE ISN'T A DIURETIC IN IT.  PLEASE LET HIM KNOW

## 2017-12-11 DIAGNOSIS — R39.15 URINARY URGENCY: Primary | ICD-10-CM

## 2017-12-11 DIAGNOSIS — R35.0 URINARY FREQUENCY: ICD-10-CM

## 2017-12-26 DIAGNOSIS — G47.26 SHIFT WORK SLEEP DISORDER: ICD-10-CM

## 2017-12-26 RX ORDER — TRAZODONE HYDROCHLORIDE 100 MG/1
TABLET ORAL
Qty: 30 TABLET | Refills: 0 | Status: SHIPPED | OUTPATIENT
Start: 2017-12-26 | End: 2018-01-22

## 2018-01-22 DIAGNOSIS — G47.26 SHIFT WORK SLEEP DISORDER: ICD-10-CM

## 2018-01-22 RX ORDER — TRAZODONE HYDROCHLORIDE 100 MG/1
TABLET ORAL
Qty: 30 TABLET | Refills: 0 | Status: SHIPPED | OUTPATIENT
Start: 2018-01-22 | End: 2018-02-20

## 2018-02-01 ENCOUNTER — OFFICE VISIT (OUTPATIENT)
Dept: FAMILY MEDICINE CLINIC | Facility: CLINIC | Age: 53
End: 2018-02-01

## 2018-02-01 VITALS
OXYGEN SATURATION: 99 % | SYSTOLIC BLOOD PRESSURE: 120 MMHG | TEMPERATURE: 98 F | BODY MASS INDEX: 33 KG/M2 | DIASTOLIC BLOOD PRESSURE: 72 MMHG | WEIGHT: 247 LBS | HEART RATE: 76 BPM

## 2018-02-01 DIAGNOSIS — J01.90 ACUTE RHINOSINUSITIS: Primary | ICD-10-CM

## 2018-02-01 PROCEDURE — 99213 OFFICE O/P EST LOW 20 MIN: CPT | Performed by: FAMILY MEDICINE

## 2018-02-01 RX ORDER — LEVOFLOXACIN 500 MG/1
500 TABLET, FILM COATED ORAL DAILY
Qty: 10 TABLET | Refills: 0 | Status: SHIPPED | OUTPATIENT
Start: 2018-02-01 | End: 2018-02-11

## 2018-02-01 RX ORDER — PREDNISONE 20 MG/1
TABLET ORAL
Qty: 30 TABLET | Refills: 0 | Status: SHIPPED | OUTPATIENT
Start: 2018-02-01 | End: 2018-07-25 | Stop reason: ALTCHOICE

## 2018-02-01 NOTE — PROGRESS NOTES
HPI:   Katarzyna Cuevas is a 50year old male who presents for upper respiratory symptoms for  4  days.  Patient reports congestion, dry cough, sinus pain, wheezing, prior history of bronchitis, prior history of sinusitis, denies fever   Has a lot of dry headaches    EXAM:   /72   Pulse 76   Temp 98.2 °F (36.8 °C) (Temporal)   Wt 247 lb   SpO2 99%   BMI 32.59 kg/m²   GENERAL: well developed, well nourished,in no apparent distress  SKIN: no rashes,no suspicious lesions  EYES:PERRLA, EOMI,conjunctiva a

## 2018-02-12 ENCOUNTER — MED REC SCAN ONLY (OUTPATIENT)
Dept: FAMILY MEDICINE CLINIC | Facility: CLINIC | Age: 53
End: 2018-02-12

## 2018-02-20 DIAGNOSIS — G47.26 SHIFT WORK SLEEP DISORDER: ICD-10-CM

## 2018-02-20 RX ORDER — TRAZODONE HYDROCHLORIDE 100 MG/1
TABLET ORAL
Qty: 30 TABLET | Refills: 3 | Status: SHIPPED | OUTPATIENT
Start: 2018-02-20 | End: 2018-06-19

## 2018-03-18 DIAGNOSIS — I10 ESSENTIAL HYPERTENSION: ICD-10-CM

## 2018-03-19 RX ORDER — LOSARTAN POTASSIUM 50 MG/1
TABLET ORAL
Qty: 30 TABLET | Refills: 0 | Status: SHIPPED | OUTPATIENT
Start: 2018-03-19 | End: 2018-04-22

## 2018-04-22 DIAGNOSIS — I10 ESSENTIAL HYPERTENSION: ICD-10-CM

## 2018-04-23 RX ORDER — LOSARTAN POTASSIUM 50 MG/1
TABLET ORAL
Qty: 30 TABLET | Refills: 0 | Status: SHIPPED | OUTPATIENT
Start: 2018-04-23 | End: 2018-05-22

## 2018-05-22 DIAGNOSIS — I10 ESSENTIAL HYPERTENSION: ICD-10-CM

## 2018-05-23 RX ORDER — LOSARTAN POTASSIUM 50 MG/1
TABLET ORAL
Qty: 30 TABLET | Refills: 0 | Status: SHIPPED | OUTPATIENT
Start: 2018-05-23 | End: 2018-06-19

## 2018-06-19 DIAGNOSIS — I10 ESSENTIAL HYPERTENSION: ICD-10-CM

## 2018-06-19 DIAGNOSIS — G47.26 SHIFT WORK SLEEP DISORDER: ICD-10-CM

## 2018-06-19 RX ORDER — TRAZODONE HYDROCHLORIDE 100 MG/1
TABLET ORAL
Qty: 30 TABLET | Refills: 0 | Status: SHIPPED | OUTPATIENT
Start: 2018-06-19 | End: 2018-07-27

## 2018-06-19 RX ORDER — LOSARTAN POTASSIUM 50 MG/1
TABLET ORAL
Qty: 30 TABLET | Refills: 0 | Status: SHIPPED | OUTPATIENT
Start: 2018-06-19 | End: 2018-08-03

## 2018-06-19 NOTE — TELEPHONE ENCOUNTER
Losartan 50mg  Last refill: 05/23/18  Qty: 30  W/ 0 refills  Last ov: 02/20/17    Trazodone 100mg  Last refill: 02/20/18  Qty: 30  W/ 3 refills  Last ov:02/20/17  Future Appointments  Date Time Provider Millicent Victoria   7/31/2018 10:00 AM Rosalie Pizano,

## 2018-07-25 ENCOUNTER — TELEPHONE (OUTPATIENT)
Dept: FAMILY MEDICINE CLINIC | Facility: CLINIC | Age: 53
End: 2018-07-25

## 2018-07-25 RX ORDER — CHLORAL HYDRATE 500 MG
1000 CAPSULE ORAL DAILY
Status: ON HOLD | COMMUNITY
End: 2018-08-22

## 2018-07-25 RX ORDER — TAMSULOSIN HYDROCHLORIDE 0.4 MG/1
0.4 CAPSULE ORAL DAILY
COMMUNITY

## 2018-07-25 RX ORDER — BUPRENORPHINE HCL 8 MG/1
1 TABLET SUBLINGUAL EVERY OTHER DAY
Status: ON HOLD | COMMUNITY
End: 2018-08-22

## 2018-07-25 RX ORDER — CELECOXIB 200 MG/1
200 CAPSULE ORAL DAILY
Status: ON HOLD | COMMUNITY
End: 2018-08-22

## 2018-07-25 NOTE — TELEPHONE ENCOUNTER
NEEDS TO KNOW PRE OP LABS ORDERED BY DR Yas Ashraf? THEY HAVE ANESTHESIA REQUIREMENTS. IF SHE IS ON ANOTHER CALL, PLEASE LEAVE MSG WHEN SHE SHOULD CALL BACK.

## 2018-07-25 NOTE — TELEPHONE ENCOUNTER
Domonique Reddy that we do not have any pre-op from Dr. Juan Alberto Lopez office  Reggie Cruz has a call into Dr. Juan Alberto Lopez

## 2018-07-27 DIAGNOSIS — G47.26 SHIFT WORK SLEEP DISORDER: ICD-10-CM

## 2018-07-27 NOTE — TELEPHONE ENCOUNTER
LAST OV: 2/1/18   LAST REFILL: 6/19/18 #30 W/ 0 RF    Future Appointments  Date Time Provider Millicent Victoria   7/31/2018 10:00 AM Ozzie Pizano MD [facility] [place]   8/13/2018 11:30 AM JOINT CLASS PREOP PT None   8/13/2018 12:30 PM [facility]

## 2018-07-28 RX ORDER — TRAZODONE HYDROCHLORIDE 100 MG/1
TABLET ORAL
Qty: 30 TABLET | Refills: 0 | Status: SHIPPED | OUTPATIENT
Start: 2018-07-28 | End: 2018-07-31 | Stop reason: ALTCHOICE

## 2018-07-31 ENCOUNTER — LAB ENCOUNTER (OUTPATIENT)
Dept: LAB | Age: 53
End: 2018-07-31
Attending: FAMILY MEDICINE
Payer: COMMERCIAL

## 2018-07-31 ENCOUNTER — OFFICE VISIT (OUTPATIENT)
Dept: FAMILY MEDICINE CLINIC | Facility: CLINIC | Age: 53
End: 2018-07-31
Payer: COMMERCIAL

## 2018-07-31 ENCOUNTER — HOSPITAL ENCOUNTER (OUTPATIENT)
Dept: GENERAL RADIOLOGY | Age: 53
Discharge: HOME | End: 2018-07-31
Attending: FAMILY MEDICINE
Payer: COMMERCIAL

## 2018-07-31 VITALS
WEIGHT: 250 LBS | HEIGHT: 73 IN | SYSTOLIC BLOOD PRESSURE: 130 MMHG | HEART RATE: 76 BPM | TEMPERATURE: 98 F | DIASTOLIC BLOOD PRESSURE: 84 MMHG | OXYGEN SATURATION: 98 % | BODY MASS INDEX: 33.13 KG/M2

## 2018-07-31 DIAGNOSIS — G47.26 SHIFT WORK SLEEP DISORDER: ICD-10-CM

## 2018-07-31 DIAGNOSIS — M17.12 DEGENERATIVE JOINT DISEASE OF KNEE, LEFT: ICD-10-CM

## 2018-07-31 DIAGNOSIS — Z01.818 PRE-PROCEDURAL EXAMINATION: Primary | ICD-10-CM

## 2018-07-31 DIAGNOSIS — Z01.818 PRE-PROCEDURAL EXAMINATION: ICD-10-CM

## 2018-07-31 DIAGNOSIS — M17.12 PRIMARY OSTEOARTHRITIS OF LEFT KNEE: ICD-10-CM

## 2018-07-31 DIAGNOSIS — Z01.810 PRE-OPERATIVE CARDIOVASCULAR EXAMINATION: ICD-10-CM

## 2018-07-31 DIAGNOSIS — Z01.812 PRE-PROCEDURE LAB EXAM: ICD-10-CM

## 2018-07-31 LAB
ANION GAP SERPL CALC-SCNC: 7 MMOL/L (ref 0–18)
ANTIBODY SCREEN: NEGATIVE
BASOPHILS # BLD AUTO: 0.05 X10(3) UL (ref 0–0.1)
BASOPHILS NFR BLD AUTO: 0.9 %
BUN BLD-MCNC: 10 MG/DL (ref 8–20)
BUN/CREAT SERPL: 13.9 (ref 10–20)
CALCIUM BLD-MCNC: 9.4 MG/DL (ref 8.3–10.3)
CHLORIDE SERPL-SCNC: 104 MMOL/L (ref 101–111)
CO2 SERPL-SCNC: 29 MMOL/L (ref 22–32)
CREAT BLD-MCNC: 0.72 MG/DL (ref 0.7–1.3)
EOSINOPHIL # BLD AUTO: 0.34 X10(3) UL (ref 0–0.3)
EOSINOPHIL NFR BLD AUTO: 6.4 %
ERYTHROCYTE [DISTWIDTH] IN BLOOD BY AUTOMATED COUNT: 12.6 % (ref 11.5–16)
GLUCOSE BLD-MCNC: 108 MG/DL (ref 70–99)
HCT VFR BLD AUTO: 43.3 % (ref 37–53)
HGB BLD-MCNC: 14.2 G/DL (ref 13–17)
IMMATURE GRANULOCYTE COUNT: 0.02 X10(3) UL (ref 0–1)
IMMATURE GRANULOCYTE RATIO %: 0.4 %
LYMPHOCYTES # BLD AUTO: 1.12 X10(3) UL (ref 0.9–4)
LYMPHOCYTES NFR BLD AUTO: 21.2 %
MCH RBC QN AUTO: 28 PG (ref 27–33.2)
MCHC RBC AUTO-ENTMCNC: 32.8 G/DL (ref 31–37)
MCV RBC AUTO: 85.4 FL (ref 80–99)
MONOCYTES # BLD AUTO: 0.89 X10(3) UL (ref 0.1–1)
MONOCYTES NFR BLD AUTO: 16.9 %
NEUTROPHIL ABS PRELIM: 2.86 X10 (3) UL (ref 1.3–6.7)
NEUTROPHILS # BLD AUTO: 2.86 X10(3) UL (ref 1.3–6.7)
NEUTROPHILS NFR BLD AUTO: 54.2 %
OSMOLALITY SERPL CALC.SUM OF ELEC: 290 MOSM/KG (ref 275–295)
PLATELET # BLD AUTO: 173 10(3)UL (ref 150–450)
POTASSIUM SERPL-SCNC: 4 MMOL/L (ref 3.6–5.1)
RBC # BLD AUTO: 5.07 X10(6)UL (ref 4.3–5.7)
RED CELL DISTRIBUTION WIDTH-SD: 38.8 FL (ref 35.1–46.3)
RH BLOOD TYPE: NEGATIVE
SODIUM SERPL-SCNC: 140 MMOL/L (ref 136–144)
WBC # BLD AUTO: 5.3 X10(3) UL (ref 4–13)

## 2018-07-31 PROCEDURE — 86900 BLOOD TYPING SEROLOGIC ABO: CPT

## 2018-07-31 PROCEDURE — 86850 RBC ANTIBODY SCREEN: CPT

## 2018-07-31 PROCEDURE — 85025 COMPLETE CBC W/AUTO DIFF WBC: CPT

## 2018-07-31 PROCEDURE — 87081 CULTURE SCREEN ONLY: CPT

## 2018-07-31 PROCEDURE — 80048 BASIC METABOLIC PNL TOTAL CA: CPT

## 2018-07-31 PROCEDURE — 71046 X-RAY EXAM CHEST 2 VIEWS: CPT | Performed by: FAMILY MEDICINE

## 2018-07-31 PROCEDURE — 99396 PREV VISIT EST AGE 40-64: CPT | Performed by: FAMILY MEDICINE

## 2018-07-31 PROCEDURE — 93000 ELECTROCARDIOGRAM COMPLETE: CPT | Performed by: FAMILY MEDICINE

## 2018-07-31 PROCEDURE — 36415 COLL VENOUS BLD VENIPUNCTURE: CPT

## 2018-07-31 PROCEDURE — 86901 BLOOD TYPING SEROLOGIC RH(D): CPT

## 2018-07-31 RX ORDER — ZOLPIDEM TARTRATE 5 MG/1
5 TABLET ORAL NIGHTLY PRN
Qty: 30 TABLET | Refills: 0 | Status: SHIPPED
Start: 2018-07-31 | End: 2018-10-01

## 2018-07-31 NOTE — H&P
Arline Barbosa is a 48year old male. Patient presents with:  Pre-Op Exam: Surgery 08/21/18 at [facility]. . Left total knee arthroplasty. Bal Angles  by  EKG; CBC; CMP;MSSA/MRSA; U/A; PT/Inr.. room 6      HPI:   PREOP EXAM      PRE-OP Physical  What is the full na total) by nebulization every 4 (four) hours as needed for Wheezing.  Disp: 75 mL Rfl: 3      Past Medical History:   Diagnosis Date   • BPH (benign prostatic hyperplasia)    • High blood pressure    • HTN (hypertension)    • Osteoarthritis     knees   • Vis CHEST PA + LAT CHEST (CPT=71046); Future    Pre-operative cardiovascular examination  -     ELECTROCARDIOGRAM, COMPLETE    Pre-procedure lab exam  -     CBC WITH DIFFERENTIAL WITH PLATELET; Future  -     BASIC METABOLIC PANEL (8);  Future  -     MSSA AND MR

## 2018-08-03 ENCOUNTER — OFFICE VISIT (OUTPATIENT)
Dept: FAMILY MEDICINE CLINIC | Facility: CLINIC | Age: 53
End: 2018-08-03
Payer: COMMERCIAL

## 2018-08-03 VITALS
WEIGHT: 244 LBS | TEMPERATURE: 99 F | DIASTOLIC BLOOD PRESSURE: 80 MMHG | OXYGEN SATURATION: 93 % | BODY MASS INDEX: 32 KG/M2 | HEART RATE: 110 BPM | SYSTOLIC BLOOD PRESSURE: 120 MMHG

## 2018-08-03 DIAGNOSIS — J01.90 ACUTE RHINOSINUSITIS: Primary | ICD-10-CM

## 2018-08-03 DIAGNOSIS — I10 ESSENTIAL HYPERTENSION: ICD-10-CM

## 2018-08-03 DIAGNOSIS — H65.193 ACUTE SEROMUCINOUS OTITIS MEDIA, BILATERAL: ICD-10-CM

## 2018-08-03 PROCEDURE — 99213 OFFICE O/P EST LOW 20 MIN: CPT | Performed by: FAMILY MEDICINE

## 2018-08-03 RX ORDER — PREDNISONE 20 MG/1
TABLET ORAL
Qty: 30 TABLET | Refills: 0 | Status: ON HOLD | OUTPATIENT
Start: 2018-08-03 | End: 2018-08-21 | Stop reason: ALTCHOICE

## 2018-08-03 RX ORDER — LEVOFLOXACIN 500 MG/1
500 TABLET, FILM COATED ORAL DAILY
Qty: 10 TABLET | Refills: 0 | Status: SHIPPED | OUTPATIENT
Start: 2018-08-03 | End: 2018-08-13

## 2018-08-03 RX ORDER — LOSARTAN POTASSIUM 50 MG/1
50 TABLET ORAL DAILY
Qty: 30 TABLET | Refills: 2 | Status: SHIPPED | OUTPATIENT
Start: 2018-08-03 | End: 2018-11-07

## 2018-08-03 NOTE — PROGRESS NOTES
HPI:   Terri Snellen is a 48year old male who presents for upper respiratory symptoms for  4  days.  Patient reports sore throat, congestion, low grade fever, dry cough, ear pain, OTC cold meds have not been helping, prior history of sinusitis, no en OTHER SURGICAL HISTORY      Comment: LEFT KNEE ACL RECONSTRUCTION  No date: TONSILLECTOMY   Family History   Problem Relation Age of Onset   • Hypertension Father    • Thyroid disease Mother    • Cancer Paternal Grandmother      breast      Smoking status: seromucinous otitis media, bilateral              The patient indicates understanding of these issues and agrees to the plan. The patient is asked to return if sx's persist or worsen. No orders of the defined types were placed in this encounter.

## 2018-08-10 RX ORDER — MELATONIN
325
Status: ON HOLD | COMMUNITY
End: 2018-08-22

## 2018-08-13 ENCOUNTER — HOSPITAL ENCOUNTER (OUTPATIENT)
Dept: PHYSICAL THERAPY | Facility: HOSPITAL | Age: 53
Discharge: HOME | End: 2018-08-13
Attending: ORTHOPAEDIC SURGERY
Payer: COMMERCIAL

## 2018-08-13 ENCOUNTER — APPOINTMENT (OUTPATIENT)
Dept: LAB | Facility: HOSPITAL | Age: 53
End: 2018-08-13
Payer: COMMERCIAL

## 2018-08-13 DIAGNOSIS — M17.12 DEGENERATIVE JOINT DISEASE OF KNEE, LEFT: ICD-10-CM

## 2018-08-15 ENCOUNTER — MED REC SCAN ONLY (OUTPATIENT)
Dept: FAMILY MEDICINE CLINIC | Facility: CLINIC | Age: 53
End: 2018-08-15

## 2018-08-21 ENCOUNTER — ANESTHESIA EVENT (OUTPATIENT)
Dept: SURGERY | Facility: HOSPITAL | Age: 53
DRG: 470 | End: 2018-08-21
Payer: COMMERCIAL

## 2018-08-21 ENCOUNTER — HOSPITAL ENCOUNTER (OUTPATIENT)
Facility: HOSPITAL | Age: 53
Setting detail: OBSERVATION
Discharge: HOME HEALTH | DRG: 470 | End: 2018-08-22
Attending: ORTHOPAEDIC SURGERY | Admitting: ORTHOPAEDIC SURGERY
Payer: COMMERCIAL

## 2018-08-21 ENCOUNTER — SURGERY (OUTPATIENT)
Age: 53
End: 2018-08-21

## 2018-08-21 ENCOUNTER — ANESTHESIA (OUTPATIENT)
Dept: SURGERY | Facility: HOSPITAL | Age: 53
DRG: 470 | End: 2018-08-21
Payer: COMMERCIAL

## 2018-08-21 ENCOUNTER — APPOINTMENT (OUTPATIENT)
Dept: GENERAL RADIOLOGY | Facility: HOSPITAL | Age: 53
DRG: 470 | End: 2018-08-21
Attending: PHYSICIAN ASSISTANT
Payer: COMMERCIAL

## 2018-08-21 DIAGNOSIS — M17.12 DEGENERATIVE JOINT DISEASE OF KNEE, LEFT: Primary | ICD-10-CM

## 2018-08-21 PROCEDURE — 0QPH04Z REMOVAL OF INTERNAL FIXATION DEVICE FROM LEFT TIBIA, OPEN APPROACH: ICD-10-PCS | Performed by: ANESTHESIOLOGY

## 2018-08-21 PROCEDURE — 73560 X-RAY EXAM OF KNEE 1 OR 2: CPT | Performed by: PHYSICIAN ASSISTANT

## 2018-08-21 PROCEDURE — 0SRD0J9 REPLACEMENT OF LEFT KNEE JOINT WITH SYNTHETIC SUBSTITUTE, CEMENTED, OPEN APPROACH: ICD-10-PCS | Performed by: ORTHOPAEDIC SURGERY

## 2018-08-21 PROCEDURE — 99254 IP/OBS CNSLTJ NEW/EST MOD 60: CPT | Performed by: INTERNAL MEDICINE

## 2018-08-21 DEVICE — BONE CEMENT HI VISCOSITY R+G: Type: IMPLANTABLE DEVICE | Site: KNEE | Status: FUNCTIONAL

## 2018-08-21 RX ORDER — ALFUZOSIN HYDROCHLORIDE 10 MG/1
10 TABLET, EXTENDED RELEASE ORAL
Status: DISCONTINUED | OUTPATIENT
Start: 2018-08-22 | End: 2018-08-22

## 2018-08-21 RX ORDER — DIPHENHYDRAMINE HYDROCHLORIDE 50 MG/ML
12.5 INJECTION INTRAMUSCULAR; INTRAVENOUS EVERY 4 HOURS PRN
Status: DISCONTINUED | OUTPATIENT
Start: 2018-08-21 | End: 2018-08-22

## 2018-08-21 RX ORDER — ACETAMINOPHEN 500 MG
1000 TABLET ORAL ONCE
Status: DISCONTINUED | OUTPATIENT
Start: 2018-08-21 | End: 2018-08-21 | Stop reason: HOSPADM

## 2018-08-21 RX ORDER — ONDANSETRON 2 MG/ML
4 INJECTION INTRAMUSCULAR; INTRAVENOUS AS NEEDED
Status: DISCONTINUED | OUTPATIENT
Start: 2018-08-21 | End: 2018-08-21 | Stop reason: HOSPADM

## 2018-08-21 RX ORDER — ACETAMINOPHEN 325 MG/1
TABLET ORAL
Status: COMPLETED
Start: 2018-08-21 | End: 2018-08-21

## 2018-08-21 RX ORDER — ZOLPIDEM TARTRATE 5 MG/1
5 TABLET ORAL NIGHTLY PRN
Status: DISCONTINUED | OUTPATIENT
Start: 2018-08-21 | End: 2018-08-22

## 2018-08-21 RX ORDER — METOCLOPRAMIDE HYDROCHLORIDE 5 MG/ML
10 INJECTION INTRAMUSCULAR; INTRAVENOUS EVERY 6 HOURS PRN
Status: DISCONTINUED | OUTPATIENT
Start: 2018-08-21 | End: 2018-08-22

## 2018-08-21 RX ORDER — MORPHINE SULFATE 4 MG/ML
6 INJECTION, SOLUTION INTRAMUSCULAR; INTRAVENOUS EVERY 2 HOUR PRN
Status: DISCONTINUED | OUTPATIENT
Start: 2018-08-21 | End: 2018-08-22

## 2018-08-21 RX ORDER — METOCLOPRAMIDE HYDROCHLORIDE 5 MG/ML
10 INJECTION INTRAMUSCULAR; INTRAVENOUS AS NEEDED
Status: DISCONTINUED | OUTPATIENT
Start: 2018-08-21 | End: 2018-08-21 | Stop reason: HOSPADM

## 2018-08-21 RX ORDER — OXYCODONE HCL 10 MG/1
10 TABLET, FILM COATED, EXTENDED RELEASE ORAL
Status: COMPLETED | OUTPATIENT
Start: 2018-08-21 | End: 2018-08-21

## 2018-08-21 RX ORDER — ACETAMINOPHEN 325 MG/1
650 TABLET ORAL ONCE
Status: DISCONTINUED | OUTPATIENT
Start: 2018-08-21 | End: 2018-08-21 | Stop reason: HOSPADM

## 2018-08-21 RX ORDER — OXYCODONE HYDROCHLORIDE 10 MG/1
10 TABLET ORAL EVERY 4 HOURS PRN
Status: DISCONTINUED | OUTPATIENT
Start: 2018-08-21 | End: 2018-08-22

## 2018-08-21 RX ORDER — BISACODYL 10 MG
10 SUPPOSITORY, RECTAL RECTAL
Status: DISCONTINUED | OUTPATIENT
Start: 2018-08-21 | End: 2018-08-22

## 2018-08-21 RX ORDER — SENNOSIDES 8.6 MG
17.2 TABLET ORAL NIGHTLY
Status: DISCONTINUED | OUTPATIENT
Start: 2018-08-21 | End: 2018-08-22

## 2018-08-21 RX ORDER — MORPHINE SULFATE 4 MG/ML
4 INJECTION, SOLUTION INTRAMUSCULAR; INTRAVENOUS EVERY 2 HOUR PRN
Status: DISCONTINUED | OUTPATIENT
Start: 2018-08-21 | End: 2018-08-22

## 2018-08-21 RX ORDER — SODIUM CHLORIDE, SODIUM LACTATE, POTASSIUM CHLORIDE, CALCIUM CHLORIDE 600; 310; 30; 20 MG/100ML; MG/100ML; MG/100ML; MG/100ML
INJECTION, SOLUTION INTRAVENOUS CONTINUOUS
Status: DISCONTINUED | OUTPATIENT
Start: 2018-08-21 | End: 2018-08-21 | Stop reason: HOSPADM

## 2018-08-21 RX ORDER — DIPHENHYDRAMINE HCL 25 MG
25 CAPSULE ORAL EVERY 4 HOURS PRN
Status: DISCONTINUED | OUTPATIENT
Start: 2018-08-21 | End: 2018-08-22

## 2018-08-21 RX ORDER — ACETAMINOPHEN 325 MG/1
650 TABLET ORAL 4 TIMES DAILY
Status: DISCONTINUED | OUTPATIENT
Start: 2018-08-21 | End: 2018-08-22

## 2018-08-21 RX ORDER — DOCUSATE SODIUM 100 MG/1
100 CAPSULE, LIQUID FILLED ORAL 2 TIMES DAILY
Status: DISCONTINUED | OUTPATIENT
Start: 2018-08-21 | End: 2018-08-22

## 2018-08-21 RX ORDER — OXYCODONE HYDROCHLORIDE 15 MG/1
15 TABLET ORAL EVERY 4 HOURS PRN
Status: DISCONTINUED | OUTPATIENT
Start: 2018-08-21 | End: 2018-08-22

## 2018-08-21 RX ORDER — ACETAMINOPHEN 325 MG/1
650 TABLET ORAL ONCE
Status: COMPLETED | OUTPATIENT
Start: 2018-08-21 | End: 2018-08-21

## 2018-08-21 RX ORDER — HYDROCODONE BITARTRATE AND ACETAMINOPHEN 5; 325 MG/1; MG/1
1 TABLET ORAL AS NEEDED
Status: DISCONTINUED | OUTPATIENT
Start: 2018-08-21 | End: 2018-08-21 | Stop reason: HOSPADM

## 2018-08-21 RX ORDER — SODIUM CHLORIDE 9 MG/ML
INJECTION, SOLUTION INTRAVENOUS CONTINUOUS
Status: DISCONTINUED | OUTPATIENT
Start: 2018-08-21 | End: 2018-08-21 | Stop reason: HOSPADM

## 2018-08-21 RX ORDER — CLINDAMYCIN PHOSPHATE 900 MG/50ML
900 INJECTION INTRAVENOUS EVERY 8 HOURS
Status: COMPLETED | OUTPATIENT
Start: 2018-08-21 | End: 2018-08-22

## 2018-08-21 RX ORDER — DIPHENHYDRAMINE HYDROCHLORIDE 50 MG/ML
25 INJECTION INTRAMUSCULAR; INTRAVENOUS ONCE AS NEEDED
Status: ACTIVE | OUTPATIENT
Start: 2018-08-21 | End: 2018-08-21

## 2018-08-21 RX ORDER — SCOLOPAMINE TRANSDERMAL SYSTEM 1 MG/1
1 PATCH, EXTENDED RELEASE TRANSDERMAL ONCE
Status: DISCONTINUED | OUTPATIENT
Start: 2018-08-21 | End: 2018-08-22

## 2018-08-21 RX ORDER — OXYCODONE HYDROCHLORIDE 5 MG/1
5 TABLET ORAL EVERY 4 HOURS PRN
Status: DISCONTINUED | OUTPATIENT
Start: 2018-08-21 | End: 2018-08-22

## 2018-08-21 RX ORDER — TIZANIDINE 4 MG/1
4 TABLET ORAL 3 TIMES DAILY PRN
Status: DISCONTINUED | OUTPATIENT
Start: 2018-08-21 | End: 2018-08-22

## 2018-08-21 RX ORDER — SODIUM CHLORIDE, SODIUM LACTATE, POTASSIUM CHLORIDE, CALCIUM CHLORIDE 600; 310; 30; 20 MG/100ML; MG/100ML; MG/100ML; MG/100ML
INJECTION, SOLUTION INTRAVENOUS CONTINUOUS
Status: DISCONTINUED | OUTPATIENT
Start: 2018-08-21 | End: 2018-08-22

## 2018-08-21 RX ORDER — CLINDAMYCIN PHOSPHATE 900 MG/50ML
900 INJECTION INTRAVENOUS ONCE
Status: COMPLETED | OUTPATIENT
Start: 2018-08-21 | End: 2018-08-21

## 2018-08-21 RX ORDER — BUPIVACAINE HYDROCHLORIDE AND EPINEPHRINE 5; 5 MG/ML; UG/ML
INJECTION, SOLUTION EPIDURAL; INTRACAUDAL; PERINEURAL AS NEEDED
Status: DISCONTINUED | OUTPATIENT
Start: 2018-08-21 | End: 2018-08-21

## 2018-08-21 RX ORDER — POLYETHYLENE GLYCOL 3350 17 G/17G
17 POWDER, FOR SOLUTION ORAL DAILY PRN
Status: DISCONTINUED | OUTPATIENT
Start: 2018-08-21 | End: 2018-08-22

## 2018-08-21 RX ORDER — MORPHINE SULFATE 4 MG/ML
2 INJECTION, SOLUTION INTRAMUSCULAR; INTRAVENOUS EVERY 2 HOUR PRN
Status: DISCONTINUED | OUTPATIENT
Start: 2018-08-21 | End: 2018-08-22

## 2018-08-21 RX ORDER — ALBUTEROL SULFATE 2.5 MG/3ML
2.5 SOLUTION RESPIRATORY (INHALATION) EVERY 4 HOURS PRN
Status: DISCONTINUED | OUTPATIENT
Start: 2018-08-21 | End: 2018-08-22

## 2018-08-21 RX ORDER — NALOXONE HYDROCHLORIDE 0.4 MG/ML
80 INJECTION, SOLUTION INTRAMUSCULAR; INTRAVENOUS; SUBCUTANEOUS AS NEEDED
Status: DISCONTINUED | OUTPATIENT
Start: 2018-08-21 | End: 2018-08-21 | Stop reason: HOSPADM

## 2018-08-21 RX ORDER — MORPHINE SULFATE 4 MG/ML
2 INJECTION, SOLUTION INTRAMUSCULAR; INTRAVENOUS EVERY 5 MIN PRN
Status: DISCONTINUED | OUTPATIENT
Start: 2018-08-21 | End: 2018-08-21 | Stop reason: HOSPADM

## 2018-08-21 RX ORDER — ONDANSETRON 2 MG/ML
4 INJECTION INTRAMUSCULAR; INTRAVENOUS EVERY 4 HOURS PRN
Status: DISCONTINUED | OUTPATIENT
Start: 2018-08-21 | End: 2018-08-22

## 2018-08-21 RX ORDER — DEXTROSE AND SODIUM CHLORIDE 5; .45 G/100ML; G/100ML
INJECTION, SOLUTION INTRAVENOUS CONTINUOUS
Status: DISCONTINUED | OUTPATIENT
Start: 2018-08-21 | End: 2018-08-22

## 2018-08-21 RX ORDER — DEXAMETHASONE SODIUM PHOSPHATE 4 MG/ML
4 VIAL (ML) INJECTION AS NEEDED
Status: DISCONTINUED | OUTPATIENT
Start: 2018-08-21 | End: 2018-08-21 | Stop reason: HOSPADM

## 2018-08-21 RX ORDER — HYDROCODONE BITARTRATE AND ACETAMINOPHEN 5; 325 MG/1; MG/1
2 TABLET ORAL AS NEEDED
Status: DISCONTINUED | OUTPATIENT
Start: 2018-08-21 | End: 2018-08-21 | Stop reason: HOSPADM

## 2018-08-21 RX ORDER — LOSARTAN POTASSIUM 50 MG/1
50 TABLET ORAL DAILY
Status: DISCONTINUED | OUTPATIENT
Start: 2018-08-22 | End: 2018-08-22

## 2018-08-21 RX ORDER — SODIUM PHOSPHATE, DIBASIC AND SODIUM PHOSPHATE, MONOBASIC 7; 19 G/133ML; G/133ML
1 ENEMA RECTAL ONCE AS NEEDED
Status: DISCONTINUED | OUTPATIENT
Start: 2018-08-21 | End: 2018-08-22

## 2018-08-21 RX ORDER — MELATONIN
325
Status: DISCONTINUED | OUTPATIENT
Start: 2018-08-21 | End: 2018-08-22

## 2018-08-21 NOTE — H&P (VIEW-ONLY)
Pia Lamb is a 48year old male. Patient presents with:  Pre-Op Exam: Surgery 08/21/18 at [facility]. . Left total knee arthroplasty. Zhou Cotter  by  EKG; CBC; CMP;MSSA/MRSA; U/A; PT/Inr.. room 6      HPI:   PREOP EXAM      PRE-OP Physical  What is the full na total) by nebulization every 4 (four) hours as needed for Wheezing.  Disp: 75 mL Rfl: 3      Past Medical History:   Diagnosis Date   • BPH (benign prostatic hyperplasia)    • High blood pressure    • HTN (hypertension)    • Osteoarthritis     knees   • Vis CHEST PA + LAT CHEST (CPT=71046); Future    Pre-operative cardiovascular examination  -     ELECTROCARDIOGRAM, COMPLETE    Pre-procedure lab exam  -     CBC WITH DIFFERENTIAL WITH PLATELET; Future  -     BASIC METABOLIC PANEL (8);  Future  -     MSSA AND MR

## 2018-08-21 NOTE — OPERATIVE REPORT
[facility]    PATIENT'S NAME: Magali Gan   ATTENDING PHYSICIAN: Nancy Serna M.D. OPERATING PHYSICIAN: Nancy Serna M.D.    PATIENT ACCOUNT#:   [de-identified]    LOCATION:  [address] [place] [address] #:   [id number]       DATE OF left lower extremity was then prepped and draped in the usual sterile fashion after a surgical time-out and infusion of prophylactic antibiotics. Left lower extremity had Esmarch applied and the tourniquet was inflated.   Using a sharp blade, a standard an dried. Posterior capsule and surrounding soft tissues were infused with local anesthetic. The actual [facility] KR 12 mm articular insert was placed and locked into position in the usual fashion.   The knee was once more taken through a full range of mo

## 2018-08-21 NOTE — ANESTHESIA PREPROCEDURE EVALUATION
PRE-OP EVALUATION    Patient Name: Felicita Estimable    Pre-op Diagnosis: LEFT KNEE DEGENERATIVE JOINT DISEASE    Procedure(s):  LEFT TOTAL KNEE ARTHROPLASTY    Surgeon(s) and Role:     Smitha Jeter MD - Primary    Pre-op vitals reviewed. Temp: 97. Augmentin [Amoxicillin-Pot Clavulanate]; Erythromycin; Penicillins; Sulfa Antibiotics      Anesthesia Evaluation    Patient summary reviewed.     Anesthetic Complications  (-) history of anesthetic complications         GI/Hepatic/Renal    Negative GI/hepat block  Comment: Discussed the risks and benefits of regional anesthesia, left adductor canal block,  with the patient as outlined in the anesthesia consent and the peripheral nerve block procedure note.  The paient understands, wishes to proceed and has no

## 2018-08-21 NOTE — CONSULTS
[facility]  [address] [facility] Patient Status:  Inpatient    2/15/1965 MRN [id number]   [facility] 3SW-A Attending Chris Dawson MD   Hosp Day # 0 PCP Melissa Saldana MD     Reason for consult: medical management flushed    Medications:    No current facility-administered medications on file prior to encounter.    Current Outpatient Prescriptions on File Prior to Encounter:  albuterol sulfate (2.5 MG/3ML) 0.083% Inhalation Nebu Soln Take 3 mL (2.5 mg total) by nebul 3. Xarelto   2. Essential HTN  1. Hold BP meds today and resume in am    3. Hx of diverticulosis -  1. Bowel regimen d/w pt and RN  4. Iron supplement intolerance   5. Unintentional weight loss- check TFT, will need outpatient follow up and work up.

## 2018-08-21 NOTE — BRIEF OP NOTE
Pre-Operative Diagnosis: LEFT KNEE DEGENERATIVE JOINT DISEASE  Retained hardware left proximal tibia     Post-Operative Diagnosis: LEFT KNEE DEGENERATIVE JOINT DISEASE   Retained hardware left proximal tibia     Procedure Performed:   Procedure(s):  LEFT T

## 2018-08-21 NOTE — PLAN OF CARE
Patient/Family Goals    • Patient/Family Long Term Goal Progressing    • Patient/Family Short Term Goal Progressing        S/p left total knee. Arrived to floor from PACU. Cannot wiggle toes, unable to dorsi/plantarflexion.  Denies nausea, placed on regular

## 2018-08-21 NOTE — INTERVAL H&P NOTE
Pre-op Diagnosis: LEFT KNEE DEGENERATIVE JOINT DISEASE    The above referenced H&P was reviewed by Jarvis Pineda MD on 8/21/2018, the patient was examined and no significant changes have occurred in the patient's condition since the H&P was performed.   I

## 2018-08-21 NOTE — PHYSICAL THERAPY NOTE
PHYSICAL THERAPY KNEE EVALUATION - INPATIENT     Room Number: 363/363-A  Evaluation Date: 8/21/2018  Type of Evaluation: Initial  Physician Order: PT Eval and Treat    Presenting Problem: s/p L TKA on 8/21/18  Reason for Therapy: Mobility Dysfunction and D for the following:   Left Knee flexion 45 degrees    Lower extremity strength is within functional limits except for the following:    Left Hip flexion  3+/5  Left Knee extension  3+/5  Left Knee flexion  3+/5    BALANCE  Static Sitting: Good  Dynamic Sitt verbal cues for sequencing with SBA. Exercise/Education Provided:  Gait training  Strengthening  Lower therapeutic exercise:   Ankle pumps, SLR x10 reps LLE  Transfer training    Patient End of Session: Up in chair;Needs met;RN aware of session/findings; level: modified  independent    Goal #4    Patient will negotiate 4 stairs/one curb w/ assistive device and supervision    Goal #5    AROM 0 degrees extension to 95 degrees flexion      Goal #6        Goal Comments: Goals established on 8/21/2018

## 2018-08-22 VITALS
BODY MASS INDEX: 30.97 KG/M2 | TEMPERATURE: 98 F | OXYGEN SATURATION: 92 % | RESPIRATION RATE: 18 BRPM | DIASTOLIC BLOOD PRESSURE: 46 MMHG | WEIGHT: 231.13 LBS | HEIGHT: 72.5 IN | SYSTOLIC BLOOD PRESSURE: 99 MMHG | HEART RATE: 72 BPM

## 2018-08-22 PROCEDURE — 99232 SBSQ HOSP IP/OBS MODERATE 35: CPT | Performed by: HOSPITALIST

## 2018-08-22 RX ORDER — HYDROCODONE BITARTRATE AND ACETAMINOPHEN 5; 325 MG/1; MG/1
1-2 TABLET ORAL EVERY 4 HOURS PRN
Qty: 50 TABLET | Refills: 0 | Status: SHIPPED | OUTPATIENT
Start: 2018-08-22 | End: 2018-10-01

## 2018-08-22 RX ORDER — HYDROCODONE BITARTRATE AND ACETAMINOPHEN 10; 325 MG/1; MG/1
1 TABLET ORAL EVERY 4 HOURS PRN
Status: DISCONTINUED | OUTPATIENT
Start: 2018-08-22 | End: 2018-08-22

## 2018-08-22 RX ORDER — CELECOXIB 200 MG/1
200 CAPSULE ORAL DAILY
Qty: 30 CAPSULE | Refills: 1 | Status: SHIPPED | OUTPATIENT
Start: 2018-08-22 | End: 2018-11-05

## 2018-08-22 RX ORDER — MELATONIN
325
Qty: 90 TABLET | Refills: 0 | Status: SHIPPED | OUTPATIENT
Start: 2018-08-22 | End: 2018-10-01

## 2018-08-22 RX ORDER — HYDROCODONE BITARTRATE AND ACETAMINOPHEN 10; 325 MG/1; MG/1
2 TABLET ORAL EVERY 4 HOURS PRN
Status: DISCONTINUED | OUTPATIENT
Start: 2018-08-22 | End: 2018-08-22

## 2018-08-22 RX ORDER — AMOXICILLIN 250 MG
2 CAPSULE ORAL 2 TIMES DAILY
Qty: 120 TABLET | Refills: 0 | Status: SHIPPED | OUTPATIENT
Start: 2018-08-22 | End: 2018-10-01

## 2018-08-22 RX ORDER — KETOROLAC TROMETHAMINE 30 MG/ML
30 INJECTION, SOLUTION INTRAMUSCULAR; INTRAVENOUS ONCE
Status: COMPLETED | OUTPATIENT
Start: 2018-08-22 | End: 2018-08-22

## 2018-08-22 RX ORDER — ONDANSETRON 4 MG/1
4 TABLET, ORALLY DISINTEGRATING ORAL EVERY 8 HOURS PRN
Qty: 20 TABLET | Refills: 1 | Status: SHIPPED | OUTPATIENT
Start: 2018-08-22 | End: 2018-10-01

## 2018-08-22 RX ORDER — HYDROCODONE BITARTRATE AND ACETAMINOPHEN 7.5; 325 MG/1; MG/1
1-2 TABLET ORAL EVERY 4 HOURS PRN
Qty: 75 TABLET | Refills: 0 | Status: SHIPPED | OUTPATIENT
Start: 2018-08-22 | End: 2018-10-01

## 2018-08-22 NOTE — PROGRESS NOTES
[address] [facility] Patient Status:  Inpatient    2/15/1965 MRN [id number]   [facility] 3SW-A Attending Chris Dawson MD   Hosp Day # 1 PCP Emmie Runner Gleason, MD     Subjective:  Post-Operative Day: 1 Status Post left Total

## 2018-08-22 NOTE — PAYOR COMM NOTE
--------------  ADMISSION REVIEW     Payor: JANES [address] #:  [id number]  Authorization Number: N/A    Admit date: 8/21/18  Admit time: 46       Admitting Physician: Marisol Saunders MD  Attending Physician:  Marisol Saunders MD  Primary Ca Given 10 mg Oral Diane Morales RN      OxyCODONE HCl IR (ROXICODONE) immediate release tab 15 mg     Date Action Dose Route User    8/22/2018 0612 Given 15 mg Oral Harrison Avelar RN    8/21/2018 1708 Given 15 mg Oral Isaac Mas RN      rivaroxab

## 2018-08-22 NOTE — PLAN OF CARE
D/c paperwork given, questions answered and concerns addressed. Will d/c home via personal car. Scripts given.

## 2018-08-22 NOTE — PROGRESS NOTES
[facility]  Progress Note     Terri Snellen Patient Status:  Inpatient    2/15/1965 MRN [id number]   [facility] 3SW-A Attending Yoshi Ely MD   Hosp Day # 1 PCP Moses Narvaez MD     Chief Complaint: s/p L TKA    S: Christianne Falk breakfast   • Losartan Potassium  50 mg Oral Daily   • Alfuzosin HCl ER  10 mg Oral Daily with breakfast       ASSESSMENT / PLAN:        1. Severe DJD of left knee - s/p left TKA  And removal of hardware from left proximal tibia   1. Pain control   2.  PT O

## 2018-08-22 NOTE — OCCUPATIONAL THERAPY NOTE
OCCUPATIONAL THERAPY QUICK EVALUATION - INPATIENT    Room Number: 363/363-A  Evaluation Date: 8/22/2018     Type of Evaluation: Quick Eval  Presenting Problem: s/p L TKA, removal of hardware L proximal tibia 8/21/18    Physician Order: Cinthia Garrett Consult to ALICE Urbina ASSESSMENT  Ratin  Location: L knee  Management Techniques: Activity promotion; Body mechanics;Breathing techniques;Relaxation;Repositioning    COGNITION  WFL    RANGE OF MOTION AND STRENGTH ASSESSMENT  Upper extremity ROM is within functional limits (r pain management, shower transfers, car transfers with good verbal understanding. Patient End of Session: Up in chair;Needs met;Call light within reach;RN aware of session/findings; All patient questions and concerns addressed;SCDs in place; Ice applied;F supervision level or above; patient reports will have supervision at home  Patient/Caregiver able to demonstrate safety with ADLS: At supervision level or above; patient reports will have supervision at home

## 2018-08-22 NOTE — CM/SW NOTE
08/22/18 1400   CM/SW Referral Data   Referral Source Physician   Reason for Referral Discharge planning   Informant Patient;Spouse;Edward Staff   Pertinent Medical Hx   Primary Care Physician Name Jameson Lafleur MD   Patient Info   Patient's Mental Status

## 2018-08-22 NOTE — PHYSICAL THERAPY NOTE
PHYSICAL THERAPY KNEE TREATMENT NOTE - INPATIENT     Room Number: 363/363-A     Session: 1&2   Number of Visits to Meet Established Goals: 4    Presenting Problem: s/p L TKA on 8/21/18    Problem List  Active Problems:    Degenerative joint disease of kne Score: 11.2%   Standardized Score (AM-PAC Scale): 56.93   CMS Modifier (G-Code): CI    FUNCTIONAL ABILITY STATUS  Gait Assessment   Gait Assistance: Modified independent  Distance (ft): 300  Assistive Device: Rolling walker  Pattern:  (step-to gait pattern tolerance was good.  was present yes   is a family    Knee ROM      L Knee Flexion (degrees): 80     L Knee Extension (degrees): 17    Patient End of Session: Up in chair;Needs met;RN aware of session/findings;Call light within reach; All patient

## 2018-08-22 NOTE — DISCHARGE SUMMARY
[facility]  Discharge Summary    Terri Snellen Patient Status:  Inpatient    2/15/1965 MRN [id number]   [facility] 3SW-A Attending Yoshi Ely MD   [facility] Day # 1 PCP Moses Narvaez MD     Date of Admission: 2018 Dispositio 8.6-50 MG Oral Tab  Take 2 tablets by mouth 2 (two) times daily.  As needed for constipation  Qty: 120 tablet Refills: 0    ondansetron (ZOFRAN ODT) 4 MG Oral Tablet Dispersible  Take 1 tablet (4 mg total) by mouth every 8 (eight) hours as needed for Nausea

## 2018-08-23 NOTE — CM/SW NOTE
08/23/18 1000   Discharge disposition   Expected discharge disposition Home-Health   Name of Facillity/Home Care/Hospice [facility]   Discharge transportation Private car

## 2018-08-24 ENCOUNTER — TELEPHONE (OUTPATIENT)
Dept: FAMILY MEDICINE CLINIC | Facility: CLINIC | Age: 53
End: 2018-08-24

## 2018-08-24 NOTE — TELEPHONE ENCOUNTER
Contacted pt, verified I could release his phone number to [facility] , he gives consent. States he can be best reached at [phone]. Detailed message left for Andre Hayes at [facility] in regards to answering her question.

## 2018-08-24 NOTE — TELEPHONE ENCOUNTER
NURSE  AT [facility] WANTS PT TO CONTACT HER RE: [phone] PLEASE GIVE PT HER PHONE NUMBER.

## 2018-08-27 DIAGNOSIS — G47.26 SHIFT WORK SLEEP DISORDER: ICD-10-CM

## 2018-08-27 RX ORDER — TRAZODONE HYDROCHLORIDE 100 MG/1
TABLET ORAL
Qty: 30 TABLET | Refills: 0 | OUTPATIENT
Start: 2018-08-27

## 2018-08-27 NOTE — TELEPHONE ENCOUNTER
Last office visit with PCP: 8/03/2018       Last refill Trazodone HCL:  7/28/2018  Discontinued: alternate therapy    Pending Prescriptions Disp Refills    TRAZODONE  MG Oral Tab [Pharmacy Med Name: TRAZODONE 100MG TABLETS] 30 tablet 0     Sig: TAKE 1 TABLET(100 MG) BY MOUTH EVERY NIGHT         No future appointments.

## 2018-08-29 ENCOUNTER — PATIENT MESSAGE (OUTPATIENT)
Dept: CASE MANAGEMENT | Age: 53
End: 2018-08-29

## 2018-08-29 ENCOUNTER — PATIENT OUTREACH (OUTPATIENT)
Dept: CASE MANAGEMENT | Age: 53
End: 2018-08-29

## 2018-09-04 ENCOUNTER — TELEPHONE (OUTPATIENT)
Dept: FAMILY MEDICINE CLINIC | Facility: CLINIC | Age: 53
End: 2018-09-04

## 2018-09-04 RX ORDER — ZOLPIDEM TARTRATE 10 MG/1
10 TABLET ORAL NIGHTLY PRN
Qty: 30 TABLET | Refills: 0 | Status: SHIPPED
Start: 2018-09-04 | End: 2018-09-26

## 2018-09-04 NOTE — TELEPHONE ENCOUNTER
PT RECEIVED A CALL, WONDERING IF DR OSBORN'S OFFICE NEEDED ANYTHING FROM PT. PT IS DOING WELL AFTER KNEE SURGERY.  CALL IF THERE IS ANYTHING DR OSBORN NEEDS FROM PT, IF NOT, NO NEED TO CALL PT.

## 2018-09-11 ENCOUNTER — MED REC SCAN ONLY (OUTPATIENT)
Dept: FAMILY MEDICINE CLINIC | Facility: CLINIC | Age: 53
End: 2018-09-11

## 2018-09-26 RX ORDER — ZOLPIDEM TARTRATE 10 MG/1
10 TABLET ORAL NIGHTLY PRN
Qty: 30 TABLET | Refills: 0 | Status: SHIPPED
Start: 2018-09-26 | End: 2018-10-26

## 2018-09-26 NOTE — PAYOR COMM NOTE
--------------  DISCHARGE REVIEW    Payor: JANES [address] #:  [id number]  Authorization Number: [id number]    Admit date: 8/21/18  Admit time:  1036  Discharge Date: 8/22/2018  3:30 PM     Admitting Physician: Natasha Figueroa MD  Attending Ph 0    hydrocodone-acetaminophen (NORCO) 7.5-325 MG Oral Tab  Take 1-2 tablets by mouth every 4 (four) hours as needed for Pain.   Qty: 75 tablet Refills: 0    HYDROcodone-acetaminophen 5-325 MG Oral Tab  Take 1-2 tablets by mouth every 4 (four) hours as need with Dr. Grisel Engel in 3 weeks    Justus Stevens  8/22/2018  Electronically signed by Vanda Medrano MD on 8/22/2018 12:50 PM       REVIEWER COMMENTS  Please review clinicals and approve all DOS. Thank you in advance for your consideration.

## 2018-09-26 NOTE — TELEPHONE ENCOUNTER
Pt states that he is taking Celebrex and aspirin after his knee surgery. Pt states at time his stomach bother him. Encouraged pt to eat prior to taking medication and see if that helps. If it does not call Dr. Renata Barrientos office for for further evaluation.

## 2018-09-26 NOTE — TELEPHONE ENCOUNTER
REFILL ZOLPIDEM TO MIGUELINA MOHAN, ALSO HE HAD KNEE SURGERY & WAS PUT ON ASPIRIN & ANTIINFLAMMATORY celecoxib, HAS BEEN HAVING SOME STOMACH ISSUES, IS IT GOOD FOR HIM TO BE TAKING ALL THESE MEDS?

## 2018-10-01 ENCOUNTER — HOSPITAL ENCOUNTER (INPATIENT)
Facility: HOSPITAL | Age: 53
LOS: 4 days | Discharge: HOME HEALTH | DRG: 391 | End: 2018-10-05
Attending: EMERGENCY MEDICINE | Admitting: HOSPITALIST
Payer: COMMERCIAL

## 2018-10-01 ENCOUNTER — APPOINTMENT (OUTPATIENT)
Dept: CT IMAGING | Age: 53
End: 2018-10-01
Attending: FAMILY MEDICINE
Payer: COMMERCIAL

## 2018-10-01 ENCOUNTER — HOSPITAL ENCOUNTER (OUTPATIENT)
Age: 53
Discharge: ANOTHER HOSPITAL | End: 2018-10-01
Attending: FAMILY MEDICINE
Payer: COMMERCIAL

## 2018-10-01 VITALS
TEMPERATURE: 100 F | WEIGHT: 240 LBS | SYSTOLIC BLOOD PRESSURE: 110 MMHG | HEART RATE: 90 BPM | BODY MASS INDEX: 32.51 KG/M2 | DIASTOLIC BLOOD PRESSURE: 74 MMHG | OXYGEN SATURATION: 96 % | HEIGHT: 72 IN | RESPIRATION RATE: 18 BRPM

## 2018-10-01 DIAGNOSIS — K65.1 ABDOMINAL VISCERAL ABSCESS (HCC): ICD-10-CM

## 2018-10-01 DIAGNOSIS — K57.92 ACUTE DIVERTICULITIS: Primary | ICD-10-CM

## 2018-10-01 DIAGNOSIS — K57.20 DIVERTICULITIS OF LARGE INTESTINE WITH PERFORATION AND ABSCESS WITHOUT BLEEDING: ICD-10-CM

## 2018-10-01 PROCEDURE — 99254 IP/OBS CNSLTJ NEW/EST MOD 60: CPT | Performed by: SURGERY

## 2018-10-01 PROCEDURE — 99214 OFFICE O/P EST MOD 30 MIN: CPT

## 2018-10-01 PROCEDURE — 80047 BASIC METABLC PNL IONIZED CA: CPT

## 2018-10-01 PROCEDURE — 99215 OFFICE O/P EST HI 40 MIN: CPT

## 2018-10-01 PROCEDURE — 99223 1ST HOSP IP/OBS HIGH 75: CPT | Performed by: HOSPITALIST

## 2018-10-01 PROCEDURE — 36415 COLL VENOUS BLD VENIPUNCTURE: CPT

## 2018-10-01 PROCEDURE — 74177 CT ABD & PELVIS W/CONTRAST: CPT | Performed by: FAMILY MEDICINE

## 2018-10-01 PROCEDURE — 85025 COMPLETE CBC W/AUTO DIFF WBC: CPT | Performed by: FAMILY MEDICINE

## 2018-10-01 RX ORDER — LEVOFLOXACIN 5 MG/ML
750 INJECTION, SOLUTION INTRAVENOUS EVERY 24 HOURS
Status: DISCONTINUED | OUTPATIENT
Start: 2018-10-02 | End: 2018-10-05

## 2018-10-01 RX ORDER — METRONIDAZOLE 500 MG/100ML
500 INJECTION, SOLUTION INTRAVENOUS ONCE
Status: COMPLETED | OUTPATIENT
Start: 2018-10-01 | End: 2018-10-01

## 2018-10-01 RX ORDER — ONDANSETRON 2 MG/ML
4 INJECTION INTRAMUSCULAR; INTRAVENOUS EVERY 6 HOURS PRN
Status: DISCONTINUED | OUTPATIENT
Start: 2018-10-01 | End: 2018-10-05

## 2018-10-01 RX ORDER — SODIUM CHLORIDE 9 MG/ML
INJECTION, SOLUTION INTRAVENOUS CONTINUOUS
Status: CANCELLED | OUTPATIENT
Start: 2018-10-01 | End: 2018-10-01

## 2018-10-01 RX ORDER — LEVOFLOXACIN 5 MG/ML
750 INJECTION, SOLUTION INTRAVENOUS ONCE
Status: COMPLETED | OUTPATIENT
Start: 2018-10-01 | End: 2018-10-01

## 2018-10-01 RX ORDER — ENOXAPARIN SODIUM 100 MG/ML
40 INJECTION SUBCUTANEOUS EVERY EVENING
Status: DISCONTINUED | OUTPATIENT
Start: 2018-10-01 | End: 2018-10-05

## 2018-10-01 RX ORDER — ACETAMINOPHEN 325 MG/1
650 TABLET ORAL EVERY 6 HOURS PRN
Status: DISCONTINUED | OUTPATIENT
Start: 2018-10-01 | End: 2018-10-05

## 2018-10-01 RX ORDER — MORPHINE SULFATE 4 MG/ML
4 INJECTION, SOLUTION INTRAMUSCULAR; INTRAVENOUS EVERY 2 HOUR PRN
Status: DISCONTINUED | OUTPATIENT
Start: 2018-10-01 | End: 2018-10-05

## 2018-10-01 RX ORDER — MORPHINE SULFATE 4 MG/ML
2 INJECTION, SOLUTION INTRAMUSCULAR; INTRAVENOUS EVERY 2 HOUR PRN
Status: DISCONTINUED | OUTPATIENT
Start: 2018-10-01 | End: 2018-10-05

## 2018-10-01 RX ORDER — DEXTROSE, SODIUM CHLORIDE, AND POTASSIUM CHLORIDE 5; .9; .15 G/100ML; G/100ML; G/100ML
INJECTION INTRAVENOUS CONTINUOUS
Status: DISCONTINUED | OUTPATIENT
Start: 2018-10-01 | End: 2018-10-05

## 2018-10-01 RX ORDER — METOCLOPRAMIDE HYDROCHLORIDE 5 MG/ML
10 INJECTION INTRAMUSCULAR; INTRAVENOUS EVERY 8 HOURS PRN
Status: DISCONTINUED | OUTPATIENT
Start: 2018-10-01 | End: 2018-10-05

## 2018-10-01 RX ORDER — MORPHINE SULFATE 4 MG/ML
1 INJECTION, SOLUTION INTRAMUSCULAR; INTRAVENOUS EVERY 2 HOUR PRN
Status: DISCONTINUED | OUTPATIENT
Start: 2018-10-01 | End: 2018-10-05

## 2018-10-01 RX ORDER — ASPIRIN 325 MG
325 TABLET ORAL 2 TIMES DAILY
COMMUNITY
End: 2018-10-01

## 2018-10-01 RX ORDER — FAMOTIDINE 10 MG/ML
20 INJECTION, SOLUTION INTRAVENOUS 2 TIMES DAILY
Status: DISCONTINUED | OUTPATIENT
Start: 2018-10-01 | End: 2018-10-05

## 2018-10-01 RX ORDER — MORPHINE SULFATE 4 MG/ML
4 INJECTION, SOLUTION INTRAMUSCULAR; INTRAVENOUS EVERY 30 MIN PRN
Status: DISCONTINUED | OUTPATIENT
Start: 2018-10-01 | End: 2018-10-01

## 2018-10-01 RX ORDER — LABETALOL HYDROCHLORIDE 5 MG/ML
10 INJECTION, SOLUTION INTRAVENOUS EVERY 4 HOURS PRN
Status: DISCONTINUED | OUTPATIENT
Start: 2018-10-01 | End: 2018-10-05

## 2018-10-01 RX ORDER — METRONIDAZOLE 500 MG/100ML
500 INJECTION, SOLUTION INTRAVENOUS EVERY 8 HOURS
Status: DISCONTINUED | OUTPATIENT
Start: 2018-10-02 | End: 2018-10-05

## 2018-10-01 RX ORDER — LORAZEPAM 2 MG/ML
0.5 INJECTION INTRAMUSCULAR NIGHTLY PRN
Status: DISCONTINUED | OUTPATIENT
Start: 2018-10-01 | End: 2018-10-05

## 2018-10-01 NOTE — H&P
[facility]  History and Physical     Mary Pena Patient Status:  Emergency    2/15/1965 MRN [id number]   Location [address] [facility] Attending Elvin Hall MD   Hosp Day # 0 PCP Darci Bell MD     Chief Complain swollen and flushed    Medications:    No current facility-administered medications on file prior to encounter.    Current Outpatient Medications on File Prior to Encounter:  Zolpidem Tartrate 10 MG Oral Tab Take 1 tablet (10 mg total) by mouth nightly as n lab result is older than the maximum 7 days allowed. ). No results for input(s): PTP, INR in the last 168 hours. No results for input(s): TROP, CK in the last 168 hours. Imaging: Imaging data reviewed in Epic. ASSESSMENT / PLAN:     1.  Acute S

## 2018-10-01 NOTE — ED INITIAL ASSESSMENT (HPI)
Pt sts intermittent sharp right lower/mid abd and lower back pain since Friday. Lack of energy,nasal congestion, intermittent chills, temp as high as 101, pressure WHITE.   Sts has had intermittent constipation since August. Decreased appetite, last BM- this m

## 2018-10-01 NOTE — PROGRESS NOTES
[facility] Pharmacy Note:  Renal Adjustment for levofloxacin [facility])    Quentin Haque is a 48year old male who has been prescribed levofloxacin (LEVAQUIN) 500 mg x1 in ER.   CrCl is CrCl cannot be calculated (Patient's most recent lab result is older matilde

## 2018-10-01 NOTE — ED PROVIDER NOTES
Patient Seen in: [facility] Emergency Department    History   Patient presents with:  Abdomen/Flank Pain (GI/)    Stated Complaint: from [facility] t diverticulitis and abscess    HPI    29-year-old male comes the hospital complaint of having difficulty above.    Physical Exam     ED Triage Vitals [10/01/18 1700]   /81   Pulse 88   Resp 18   Temp 99.1 °F (37.3 °C)   Temp src Temporal   SpO2 98 %   O2 Device None (Room air)       Current:/81   Pulse 88   Temp 99.1 °F (37.3 °C) (Temporal)   Resp liver measuring 45 x 60 mm has most consistent with a liver abscess. BILIARY:  No visible dilatation or calcification. PANCREAS:  No lesion, fluid collection, ductal dilatation, or atrophy. SPLEEN:  No enlargement or focal lesion.   KIDNEYS:  No mass, obs Intravenous New Bag 10/1/18 3754)           MDM   I discussed his case with Dr. Latoya Lindsey as well as the hospitalist the patient be admitted for further care for his acute diverticulitis with abscess is noted  Admission disposition: 10/1/2018  6:10 PM

## 2018-10-01 NOTE — ED PROVIDER NOTES
Patient Seen in: [address] [facility]    History   Patient presents with:  Abdomen/Flank Pain (GI/)    Stated Complaint: ABD PAIN/LETHARGIC/POST KNEE REPLACEMENT 8/2018    HPI    53-year-old male presents for abdominal pain, fever and fatigu as noted above.     Physical Exam     ED Triage Vitals [10/01/18 1314]   /89   Pulse 103   Resp 16   Temp 100 °F (37.8 °C)   Temp src Temporal   SpO2 97 %   O2 Device None (Room air)       Current:/74   Pulse 90   Temp 99.6 °F (37.6 °C) (Tempora within normal limits     MDM   Patient presents for abdominal pain, fever and fatigue. CBC with a hemoglobin of 10.6 which is slightly lower from 2 weeks ago.   CT abdomen and pelvis with contrast was done and revealed sigmoid diverticulitis with abscess f

## 2018-10-02 ENCOUNTER — APPOINTMENT (OUTPATIENT)
Dept: CT IMAGING | Facility: HOSPITAL | Age: 53
DRG: 391 | End: 2018-10-02
Attending: HOSPITALIST
Payer: COMMERCIAL

## 2018-10-02 PROCEDURE — 99153 MOD SED SAME PHYS/QHP EA: CPT | Performed by: HOSPITALIST

## 2018-10-02 PROCEDURE — 0F9030Z DRAINAGE OF LIVER WITH DRAINAGE DEVICE, PERCUTANEOUS APPROACH: ICD-10-PCS | Performed by: RADIOLOGY

## 2018-10-02 PROCEDURE — 99152 MOD SED SAME PHYS/QHP 5/>YRS: CPT | Performed by: HOSPITALIST

## 2018-10-02 PROCEDURE — 99232 SBSQ HOSP IP/OBS MODERATE 35: CPT | Performed by: SURGERY

## 2018-10-02 PROCEDURE — 0W9H30Z DRAINAGE OF RETROPERITONEUM WITH DRAINAGE DEVICE, PERCUTANEOUS APPROACH: ICD-10-PCS | Performed by: RADIOLOGY

## 2018-10-02 PROCEDURE — 49406 IMAGE CATH FLUID PERI/RETRO: CPT | Performed by: HOSPITALIST

## 2018-10-02 PROCEDURE — 99232 SBSQ HOSP IP/OBS MODERATE 35: CPT | Performed by: HOSPITALIST

## 2018-10-02 PROCEDURE — 49405 IMAGE CATH FLUID COLXN VISC: CPT | Performed by: HOSPITALIST

## 2018-10-02 RX ORDER — MIDAZOLAM HYDROCHLORIDE 1 MG/ML
INJECTION INTRAMUSCULAR; INTRAVENOUS
Status: COMPLETED
Start: 2018-10-02 | End: 2018-10-02

## 2018-10-02 RX ORDER — MIDAZOLAM HYDROCHLORIDE 1 MG/ML
1 INJECTION INTRAMUSCULAR; INTRAVENOUS EVERY 5 MIN PRN
Status: ACTIVE | OUTPATIENT
Start: 2018-10-02 | End: 2018-10-02

## 2018-10-02 RX ORDER — SODIUM CHLORIDE 9 MG/ML
INJECTION, SOLUTION INTRAVENOUS CONTINUOUS
Status: DISCONTINUED | OUTPATIENT
Start: 2018-10-02 | End: 2018-10-02 | Stop reason: HOSPADM

## 2018-10-02 RX ORDER — NALOXONE HYDROCHLORIDE 0.4 MG/ML
80 INJECTION, SOLUTION INTRAMUSCULAR; INTRAVENOUS; SUBCUTANEOUS AS NEEDED
Status: DISCONTINUED | OUTPATIENT
Start: 2018-10-02 | End: 2018-10-02 | Stop reason: HOSPADM

## 2018-10-02 RX ORDER — FLUMAZENIL 0.1 MG/ML
0.2 INJECTION, SOLUTION INTRAVENOUS AS NEEDED
Status: DISCONTINUED | OUTPATIENT
Start: 2018-10-02 | End: 2018-10-02 | Stop reason: HOSPADM

## 2018-10-02 NOTE — PROCEDURES
[facility]  Procedure Note    Rushie Meth Patient Status:  Inpatient    2/15/1965 MRN [id number]   [facility] 3NW-A Attending Anni Coronado MD   Hosp Day # 1 PCP Jameel Pizano MD     Procedure: Hepatic abscess and Pelvic absce

## 2018-10-02 NOTE — PROGRESS NOTES
[facility]  Progress Note     Felicita Estimable Patient Status:  Inpatient    2/15/1965 MRN [id number]   [facility] 3NW-A Attending Felisa Valdez MD   Hosp Day # 1 PCP Mayela Ramachandran MD     Chief Complaint: abd pain    S: Patient wheezing with PCN last year  2. Levaquin/flagyl  3. S/p drainage by ARIANNE LEW drain in placed. Cultures pending  4. Surgery c/s  5. IVF  6. NPO  7. IS  2. BPH  3. Arthritis  1. S/p recent left knee replacement  2.  Inc swelling and stiffness due to immobility

## 2018-10-02 NOTE — PLAN OF CARE
Patient back from IR. 2 drains placed, scant drainage noted in pelvic drain, no drainage noted in drain by liver. POC discussed. All questions and concerns addressed. All safety measures in place. Will continue to monitor.

## 2018-10-02 NOTE — CONSULTS
[facility]  Report of Consultation    Mary Becky Patient Status:  Inpatient    2/15/1965 MRN [id number]   [facility] 3NW-A Attending Raquel Beard MD   Hosp Day # 0 PCP Darci Bell MD     Reason for Consultation:   Mother    • Cancer Paternal Grandmother         breast        reports that he quit smoking about 3 years ago. He quit after 30.00 years of use. he has never used smokeless tobacco. He reports that he drinks about 3.0 oz of alcohol per week.  He reports that Negative for gait disturbance  Psychiatric:  Negative for inappropriate interaction and psychiatric symptoms  Respiratory:  Negative for cough, dyspnea and wheezing      Physical Exam:  Blood pressure 124/69, pulse 84, temperature 98.8 °F (37.1 °C), tempe surrounding the sigmoid colon just above the bladder with an abscess extending inferiorly off of the sigmoid colon extending to the dome of the bladder. The abscess measures   47 x 35 x 30mm.   There is secondary thickening of the bladder wall dome seconda episode conservatively. 6. This was discussed with him in detail, and he agrees to the plan. 7. GI and DVT prophylaxis    I spent 45 minutes face to face with the patient.  More than 50% of that time was spent counseling the patient and/or on coordination

## 2018-10-02 NOTE — IMAGING NOTE
Pt to ct from room 313, pt had 2 drains placed to r upper abd and center lower pelvic region. sorbaview dressings to both sites, cdi. Pt tolerated procedure well.  Pt back to room 313 via transporter, phoned report to 309 Leticia Mendes rn.

## 2018-10-02 NOTE — PROGRESS NOTES
[facility]  Progress Note    Junior Rodriguez Patient Status:  Inpatient    2/15/1965 MRN [id number]   [facility] 3NW-A Attending Franny Benavidez MD   Hosp Day # 1 PCP Jackie Pizano MD     Subjective:  No new complaints.   Minimal jermaine diverticulitis with perforation and abscess    Plan:  1. Plan for IR drainage of pericolonic abscess today  2. N.p.o. for procedure, may have ice chips following drain placement  3. Continue IV fluids, antibiotics, analgesia/antiemetics as needed  4.  Ambul

## 2018-10-02 NOTE — PHYSICAL THERAPY NOTE
PHYSICAL THERAPY QUICK EVALUATION - INPATIENT    Room Number: 313/313-A  Evaluation Date: 10/2/2018  Presenting Problem: acute diverticulitis  Physician Order: PT Eval and Treat  History:  Admitted ith R lower abd/back pain and fever CT at urgent care re sitting, flex AOM in sitting 100, self PROM to105    Lower extremity strength is within functional limits     NEUROLOGICAL FINDINGS      denies numbness/tingling light touch grossly intact BLEs                AM-PAC '6-Clicks' INPATIENT SHORT FORM - BASIC and need for patient to ambulate on unit and perform ROM  multiple times each day to prevent loss of ROM L knee, RN aware. Patient End of Session: Up in chair;Needs met;Call light within reach;RN aware of session/findings; All patient questions and satish

## 2018-10-02 NOTE — PLAN OF CARE
Assumed care of pt at [address] [place] [address]. Pt has not had any pain since coming to the unit. Pt remains on ivf and iv antibx. Pt is npo x ice. Abdomen soft non tender bs present. Left knee incision healed no drainage noted. Pt given ice pack for left knee.  Pt seen by

## 2018-10-02 NOTE — PAYOR COMM NOTE
--------------  ADMISSION REVIEW     Payor: JANES [address] #:  [id number]  Authorization Number: N/[id number]    Admit date: 10/1/18  Admit time: 6303       Admitting Physician: Nas Easton MD  Attending Physician:  Carlos Ferrera MD  Primary Car Intravenous New Bag 10/1/18 1712)   morphINE sulfate (PF) 4 MG/ML injection 4 mg (4 mg Intravenous Given 10/1/18 1712)   LevoFLOXacin in D5W (LEVAQUIN) IVPB premix 750 mg (not administered)   metRONIDAZOLE in NaCl (FLAGYL) 5 mg/ml IVPB premix 500 mg (500 m 1. Acute Sigmoid Diverticulitis with abscess in liver and adjacent to diverticulitis  1. PCN allergy, wheezing with PCN last year  2. Levaquin/flagyl  3. IR for drainage  4. Surgery c/s  5. IVF  6. NPO  2. BPH  3. Arthritis  1.  S/p recent left knee rep colon, if we are able to manage this episode conservatively. 6. This was discussed with him in detail, and he agrees to the plan.   7. GI and DVT prophylaxis    10/02/18 0449 98.8 °F (37.1 °C) 80 18 116/64 98 %   10/01/18 1855 98.8 °F (37.1 °C) 84 18 124/6 Antecubital) Naima Moscoso      dextrose 5 % and 0.9 % NaCl with KCl 20 mEq infusion     Date Action Dose Route User    10/2/2018 0518 New Bag (none) Intravenous Manolo Cummings RN    10/1/2018 2038 New Bag (none) Intravenous Manolo Cummings RN

## 2018-10-03 PROCEDURE — 99232 SBSQ HOSP IP/OBS MODERATE 35: CPT | Performed by: HOSPITALIST

## 2018-10-03 PROCEDURE — 99232 SBSQ HOSP IP/OBS MODERATE 35: CPT | Performed by: SURGERY

## 2018-10-03 NOTE — PLAN OF CARE
Assumed care of pt at [address] [place] [address]. Pt c/o pain to the right upper abdomen drain site and was medicated with relief. Right upper drain draining purulent pink milky thick drainage, mid suprapubic lower abdominal drain draining sanguinous drainage.  Report given to o

## 2018-10-03 NOTE — PROGRESS NOTES
[facility]  Progress Note      Lachelle Shepard Patient Status:  Inpatient    2/15/1965 MRN [id number]   [facility] 3NW-A Attending Ju Stone MD   Hosp Day # 2 PCP Andrew Pizano MD       Subjective:   No complaints    Objective

## 2018-10-03 NOTE — PROGRESS NOTES
[facility]  Progress Note    Esperanza Hernandez Patient Status:  Inpatient    2/15/1965 MRN [id number]   [facility] 3NW-A Attending Jessica Haro MD   Hosp Day # 2 PCP Sanaz Pizano MD     Subjective:  No new complaints.  Some right r antibiotics, analgesia/antiemetics as needed  4. Ambulate, DVT prophylaxis    My total face time with this patient was 24 minutes. Greater than half of our visit was spent in counseling the patient on the above listed diagnoses and treatment options.     E

## 2018-10-03 NOTE — PROGRESS NOTES
[facility]  Progress Note     Kae Saumya Patient Status:  Inpatient    2/15/1965 MRN [id number]   [facility] 3NW-A Attending Jael Mittal MD   Hosp Day # 2 PCP Hodan Rabago MD     Chief Complaint: abd pain    S: abd pain year  2. Levaquin/flagyl  3. S/p drainage by ARIANNE LEW drain in placed. Cultures pending  4. Surgery c/s  5. IVF  6. Advance diet if ok w/ surg  7. IS  2. BPH  3. Arthritis  1. S/p recent left knee replacement  2.  Inc swelling and stiffness due to immobility

## 2018-10-04 PROCEDURE — 99232 SBSQ HOSP IP/OBS MODERATE 35: CPT | Performed by: SURGERY

## 2018-10-04 PROCEDURE — 99232 SBSQ HOSP IP/OBS MODERATE 35: CPT | Performed by: HOSPITALIST

## 2018-10-04 RX ORDER — HYDROCODONE BITARTRATE AND ACETAMINOPHEN 5; 325 MG/1; MG/1
1 TABLET ORAL EVERY 4 HOURS PRN
Status: DISCONTINUED | OUTPATIENT
Start: 2018-10-04 | End: 2018-10-05

## 2018-10-04 RX ORDER — POTASSIUM CHLORIDE 20 MEQ/1
40 TABLET, EXTENDED RELEASE ORAL ONCE
Status: COMPLETED | OUTPATIENT
Start: 2018-10-04 | End: 2018-10-04

## 2018-10-04 NOTE — PROGRESS NOTES
[facility]  Progress Note      Lachelle Shepard Patient Status:  Inpatient    2/15/1965 MRN [id number]   [facility] 3NW-A Attending Ju Stone MD   Hosp Day # 3 PCP Sedrick Lucas MD     48year-old male with hepatic and pelvic a

## 2018-10-04 NOTE — PROGRESS NOTES
[facility]  Progress Note     Errol Rivera Patient Status:  Inpatient    2/15/1965 MRN [id number]   [facility] 3NW-A Attending Carlos Ferrera MD   Hosp Day # 3 PCP Theo Bryant MD     Chief Complaint: abd pain    S: abd pain Sigmoid Diverticulitis with abscess in liver and adjacent to diverticulitis  1. PCN allergy, wheezing with PCN last year  2. Levaquin/flagyl  3. S/p drainage by IRSaba LEW drain in placed. Cultures strep anginosis/ecoli  4. Surgery c/s  5. IVF  6.  Advance diet

## 2018-10-04 NOTE — PROGRESS NOTES
[facility]  Progress Note    Rivka Quivers Patient Status:  Inpatient    2/15/1965 MRN [id number]   [facility] 3NW-A Attending Jenifer Christina MD   Hosp Day # 3 PCP Mahendra Pizano MD     Subjective:  Pt with pain at drain sites.   Selina Weber

## 2018-10-04 NOTE — PAYOR COMM NOTE
--------------  CONTINUED STAY REVIEW    Payor: [address] #:  [id number]  Authorization Number: [id number]    Admit date: 10/1/18  Admit time: 4872    Admitting Physician: Jennifer Zamora MD  Attending Physician:  Charlie Angelo MD  Prim Intravenous Steph Christianson, RN      Potassium Chloride ER (K-DUR M20) CR tab 40 mEq     Date Action Dose Route User    10/4/2018 0905 Given 40 mEq Oral Antonio Colon RN            Plan: 10/3  1. Clear liquid diet, advance as tolerated  2.  Drain care

## 2018-10-04 NOTE — PROGRESS NOTES
Vss. Pt resting in bed comfortably this am. Both drains to ruq and llq of abdomen flushed with 10ml of saline. Pt tolerated well but c/o slight pain after flushing. No redness, swelling, or drainage noted to drain site.  llq drain draining milky pink draina

## 2018-10-05 VITALS
SYSTOLIC BLOOD PRESSURE: 131 MMHG | TEMPERATURE: 99 F | OXYGEN SATURATION: 97 % | HEIGHT: 73 IN | HEART RATE: 80 BPM | RESPIRATION RATE: 18 BRPM | DIASTOLIC BLOOD PRESSURE: 80 MMHG | WEIGHT: 240 LBS | BODY MASS INDEX: 31.81 KG/M2

## 2018-10-05 PROCEDURE — 99239 HOSP IP/OBS DSCHRG MGMT >30: CPT | Performed by: HOSPITALIST

## 2018-10-05 PROCEDURE — 99232 SBSQ HOSP IP/OBS MODERATE 35: CPT | Performed by: SURGERY

## 2018-10-05 RX ORDER — HYDROCODONE BITARTRATE AND ACETAMINOPHEN 5; 325 MG/1; MG/1
1 TABLET ORAL EVERY 4 HOURS PRN
Qty: 20 TABLET | Refills: 0 | Status: SHIPPED | OUTPATIENT
Start: 2018-10-05 | End: 2018-10-29

## 2018-10-05 RX ORDER — LEVOFLOXACIN 750 MG/1
750 TABLET ORAL DAILY
Qty: 10 TABLET | Refills: 0 | Status: SHIPPED | OUTPATIENT
Start: 2018-10-05 | End: 2018-10-25

## 2018-10-05 RX ORDER — METRONIDAZOLE 500 MG/1
500 TABLET ORAL 3 TIMES DAILY
Qty: 30 TABLET | Refills: 0 | Status: SHIPPED | OUTPATIENT
Start: 2018-10-05 | End: 2018-10-25

## 2018-10-05 NOTE — HOME CARE LIAISON
REC'D REFERRAL TO SEE PTNT AND OFFER HH. TNL MET WITH  PTNT TO DISCUSS HH. [address] [facility] NOT ABLE TO SERVICE THE [facility] AT THIS TIME.  [address]

## 2018-10-05 NOTE — PROGRESS NOTES
NURSING DISCHARGE NOTE    Discharged pt via wheelchair to [facility]. Accompanied by spouse. Belongings at hand. IV catheter d/c'd; catheter intact. Discharge instruction and education given to pt and verbalizes understanding.  Script given and filled

## 2018-10-05 NOTE — PLAN OF CARE
GASTROINTESTINAL - ADULT    • Minimal or absence of nausea and vomiting Completed    • Maintains adequate nutritional intake (undernourished) Completed        Impaired Functional Mobility    • Achieve highest/safest level of mobility/gait Completed

## 2018-10-05 NOTE — PROGRESS NOTES
[facility]  Progress Note    Johan Goldman Patient Status:  Inpatient    2/15/1965 MRN [id number]   [facility] 3NW-A Attending Cherelle Chaudhary MD   Hosp Day # 4 PCP Evin Pizano MD     Subjective:  Pt with some pain at his drains

## 2018-10-05 NOTE — CM/SW NOTE
Per daily rounds, pt may want HH to help him with his drains at home. Mary from [facility] to see pt.

## 2018-10-05 NOTE — DISCHARGE SUMMARY
[facility]  DISCHARGE SUMMARY     Marissa Lai Patient Status:  Inpatient    2/15/1965 MRN [id number]   [facility] 3NW-A Attending No att. providers found   [address] Day # 4 PCP Jocelynn Lehman MD     Date of Admission: 10/1/2018 these medications      Instructions Prescription details   HYDROcodone-acetaminophen 5-325 MG Tabs  Commonly known as:  NORCO      Take 1 tablet by mouth every 4 (four) hours as needed.    Quantity:  20 tablet  Refills:  0     levofloxacin 750 MG Tabs  Comm °C)-98.7 °F (37.1 °C)] 98.6 °F (37 °C)  Pulse:  [66-80] 80  Resp:  [18] 18  BP: (110-131)/(62-80) 131/80    Physical Exam:    General: No acute distress. Respiratory: Clear to auscultation bilaterally. No wheezes. No rhonchi.   Cardiovascular: S1, S2. Reg

## 2018-10-05 NOTE — CM/SW NOTE
10/05/18 1600   Discharge disposition   Expected discharge disposition Home-Health   Name of [address] services after discharge Skilled home care   Discharge transportation Private car

## 2018-10-05 NOTE — IMAGING NOTE
Liver and lower abd abscesses secondary to diverticulitis s/p perc drain. 50 and 25 ml outputs respectively last 24 hrs. CPM.  Poss d/c today or tomorrow.   Can go home w/ drains, w/ f/u outpatient CT abscessogram when outputs are less than 15 ml for 2 da

## 2018-10-05 NOTE — CM/SW NOTE
Enrico Rangel from [facility] said that they are unable to take pt at this time. Referral made to [facility] - waiting for a response.

## 2018-10-06 ENCOUNTER — HOSPITAL ENCOUNTER (EMERGENCY)
Facility: HOSPITAL | Age: 53
Discharge: HOME | End: 2018-10-06
Attending: EMERGENCY MEDICINE
Payer: COMMERCIAL

## 2018-10-06 ENCOUNTER — TELEPHONE (OUTPATIENT)
Dept: FAMILY MEDICINE CLINIC | Facility: CLINIC | Age: 53
End: 2018-10-06

## 2018-10-06 ENCOUNTER — APPOINTMENT (OUTPATIENT)
Dept: GENERAL RADIOLOGY | Facility: HOSPITAL | Age: 53
End: 2018-10-06
Attending: EMERGENCY MEDICINE
Payer: COMMERCIAL

## 2018-10-06 VITALS
WEIGHT: 240 LBS | DIASTOLIC BLOOD PRESSURE: 78 MMHG | HEIGHT: 73 IN | OXYGEN SATURATION: 99 % | SYSTOLIC BLOOD PRESSURE: 118 MMHG | HEART RATE: 72 BPM | RESPIRATION RATE: 18 BRPM | BODY MASS INDEX: 31.81 KG/M2 | TEMPERATURE: 98 F

## 2018-10-06 DIAGNOSIS — R55 VASOVAGAL RESPONSE: Primary | ICD-10-CM

## 2018-10-06 PROCEDURE — 71046 X-RAY EXAM CHEST 2 VIEWS: CPT | Performed by: EMERGENCY MEDICINE

## 2018-10-06 PROCEDURE — 99283 EMERGENCY DEPT VISIT LOW MDM: CPT

## 2018-10-06 PROCEDURE — 99284 EMERGENCY DEPT VISIT MOD MDM: CPT

## 2018-10-06 PROCEDURE — 85025 COMPLETE CBC W/AUTO DIFF WBC: CPT | Performed by: EMERGENCY MEDICINE

## 2018-10-06 PROCEDURE — 80053 COMPREHEN METABOLIC PANEL: CPT | Performed by: EMERGENCY MEDICINE

## 2018-10-06 NOTE — ED PROVIDER NOTES
Patient Seen in: [facility] Emergency Department    History   Patient presents with:   Other    Stated Complaint: post op decreased breath sounds    HPI    24-year-old male presents to the emergency department after a visiting nurse was concerned matilde per week      Comment: rare    Drug use: No      Review of Systems   All other systems reviewed and are negative. Positive for stated complaint: post op decreased breath sounds  Other systems are as noted in HPI.   Constitutional and vital signs review 6.81 (*)     Neutrophil Absolute 6.81 (*)     All other components within normal limits   CBC WITH DIFFERENTIAL WITH PLATELET    Narrative: The following orders were created for panel order CBC WITH DIFFERENTIAL WITH PLATELET.   Procedure

## 2018-10-06 NOTE — TELEPHONE ENCOUNTER
On call,  Called twice. No answer first time. Spoke with Lelia Scott (home health nurse) the second time, States patient has diminished lung sounds at the bases. States she spoke with his surgeon and he suggested to take him to  ER.  Patient was taken to t

## 2018-10-06 NOTE — ED INITIAL ASSESSMENT (HPI)
Just discharged with dx of diverticulitis, has drains in place for an abscess. While home health nurse was there today he had an episode of a cold sweat while sitting that lasted approx 5 minutes. No loc.  Per home nurse no breath sounds auscultated on R si

## 2018-10-07 ENCOUNTER — HOSPITAL ENCOUNTER (OUTPATIENT)
Age: 53
Discharge: HOME | End: 2018-10-07
Attending: EMERGENCY MEDICINE
Payer: COMMERCIAL

## 2018-10-07 VITALS
RESPIRATION RATE: 16 BRPM | HEART RATE: 95 BPM | SYSTOLIC BLOOD PRESSURE: 126 MMHG | OXYGEN SATURATION: 96 % | TEMPERATURE: 99 F | DIASTOLIC BLOOD PRESSURE: 79 MMHG

## 2018-10-07 DIAGNOSIS — L24.A9 DRAINAGE FROM WOUND: Primary | ICD-10-CM

## 2018-10-07 PROCEDURE — 99213 OFFICE O/P EST LOW 20 MIN: CPT

## 2018-10-07 NOTE — ED INITIAL ASSESSMENT (HPI)
Pt with 2 abd drains from surgery last Monday. States home on Friday with drains, wife irrigates the drains daily and today after pt had his first BM they noticed a brownish liquid around the insertion site of the left drain. No increased pain.

## 2018-10-07 NOTE — ED PROVIDER NOTES
Patient presents with:  Drainage    HPI:     Griselda Cabrera is a 48year old male who presents with chief complaint of drainage on the abdomen. S/p CT guided percutaneous drainage of liver and pelvic abscesses on 10/2.   Pt has advanced his diet to so F/u with gen surg tomorrow    All results reviewed and discussed with patient. See AVS for detailed discharge instructions.

## 2018-10-08 ENCOUNTER — TELEPHONE (OUTPATIENT)
Dept: SURGERY | Facility: CLINIC | Age: 53
End: 2018-10-08

## 2018-10-08 ENCOUNTER — PATIENT MESSAGE (OUTPATIENT)
Dept: CASE MANAGEMENT | Age: 53
End: 2018-10-08

## 2018-10-08 ENCOUNTER — PATIENT OUTREACH (OUTPATIENT)
Dept: CASE MANAGEMENT | Age: 53
End: 2018-10-08

## 2018-10-08 ENCOUNTER — OFFICE VISIT (OUTPATIENT)
Dept: SURGERY | Facility: CLINIC | Age: 53
End: 2018-10-08
Payer: COMMERCIAL

## 2018-10-08 VITALS
RESPIRATION RATE: 16 BRPM | HEART RATE: 92 BPM | SYSTOLIC BLOOD PRESSURE: 124 MMHG | HEIGHT: 73 IN | WEIGHT: 234 LBS | DIASTOLIC BLOOD PRESSURE: 80 MMHG | BODY MASS INDEX: 31.01 KG/M2

## 2018-10-08 DIAGNOSIS — K65.1 INTRA-ABDOMINAL ABSCESS (HCC): ICD-10-CM

## 2018-10-08 DIAGNOSIS — T85.698A BROKEN JACKSON-PRATT DRAIN, INITIAL ENCOUNTER: ICD-10-CM

## 2018-10-08 DIAGNOSIS — K57.92 ACUTE DIVERTICULITIS: Primary | ICD-10-CM

## 2018-10-08 DIAGNOSIS — Z02.9 ENCOUNTERS FOR ADMINISTRATIVE PURPOSE: ICD-10-CM

## 2018-10-08 DIAGNOSIS — K65.1 ABDOMINAL VISCERAL ABSCESS (HCC): ICD-10-CM

## 2018-10-08 PROCEDURE — 99213 OFFICE O/P EST LOW 20 MIN: CPT | Performed by: SURGERY

## 2018-10-08 PROCEDURE — 1111F DSCHRG MED/CURRENT MED MERGE: CPT

## 2018-10-08 NOTE — PROGRESS NOTES
Follow Up Visit Note       Active Problems      1. Acute diverticulitis    2. Abdominal visceral abscess ([address]    3. Intra-abdominal abscess ([address]    4.  Broken Tyrell-Newman drain, initial encounter          Chief Complaint   Patient presents with:  ER F/U LEFT KNEE ACL RECONSTRUCTION   • TONSILLECTOMY         The family history and social history have been reviewed by me today.     Family History   Problem Relation Age of Onset   • Hypertension Father    • Thyroid disease Mother    • Cancer Paternal [place] Take 1 capsule (200 mg total) by mouth daily. Disp: 30 capsule Rfl: 1   Losartan Potassium 50 MG Oral Tab Take 1 tablet (50 mg total) by mouth daily. Disp: 30 tablet Rfl: 2   tamsulosin HCl 0.4 MG Oral Cap Take 0.4 mg by mouth every other day.    Disp:  Rfl negative Sandhu's sign. No hernia. Hernia confirmed negative in the ventral area. Right upper quadrant drain with serosanguineous fluid in the bulb. The drain insertion site is clean, dry, and intact without erythema.   The lower abdominal drain has

## 2018-10-08 NOTE — TELEPHONE ENCOUNTER
Home health RN Tracee Bentley phoned office, has questions regarding pts drain care. Said pt was intructed to flush drains with saline and then aspirate back. Would also like to know if pt can resume medications, (fish oil, vitamins, celebrex).  Will talk to Dr. Tiffany Shaikh

## 2018-10-08 NOTE — TELEPHONE ENCOUNTER
This Rn talked to Dr. Forrest regarding home health nurse call from this am. Instructed to change dressing on drain q day, or more often if saturated. Flush drain as instructed per IR. Pt my resume all meds prior to surgery. Fredi understanding.

## 2018-10-09 ENCOUNTER — HOSPITAL ENCOUNTER (OUTPATIENT)
Dept: CT IMAGING | Facility: HOSPITAL | Age: 53
Discharge: HOME | End: 2018-10-09
Attending: PHYSICIAN ASSISTANT
Payer: COMMERCIAL

## 2018-10-09 VITALS
TEMPERATURE: 98 F | RESPIRATION RATE: 16 BRPM | HEART RATE: 83 BPM | OXYGEN SATURATION: 95 % | DIASTOLIC BLOOD PRESSURE: 79 MMHG | SYSTOLIC BLOOD PRESSURE: 116 MMHG

## 2018-10-09 VITALS
HEART RATE: 88 BPM | SYSTOLIC BLOOD PRESSURE: 116 MMHG | OXYGEN SATURATION: 100 % | TEMPERATURE: 98 F | DIASTOLIC BLOOD PRESSURE: 77 MMHG | RESPIRATION RATE: 18 BRPM

## 2018-10-09 DIAGNOSIS — T85.698A BROKEN JACKSON-PRATT DRAIN, INITIAL ENCOUNTER: ICD-10-CM

## 2018-10-09 PROCEDURE — 99152 MOD SED SAME PHYS/QHP 5/>YRS: CPT | Performed by: PHYSICIAN ASSISTANT

## 2018-10-09 PROCEDURE — 76080 X-RAY EXAM OF FISTULA: CPT | Performed by: PHYSICIAN ASSISTANT

## 2018-10-09 PROCEDURE — 49424 ASSESS CYST CONTRAST INJECT: CPT | Performed by: PHYSICIAN ASSISTANT

## 2018-10-09 PROCEDURE — 49406 IMAGE CATH FLUID PERI/RETRO: CPT | Performed by: PHYSICIAN ASSISTANT

## 2018-10-09 PROCEDURE — 99153 MOD SED SAME PHYS/QHP EA: CPT | Performed by: PHYSICIAN ASSISTANT

## 2018-10-09 RX ORDER — HYDROCODONE BITARTRATE AND ACETAMINOPHEN 5; 325 MG/1; MG/1
1 TABLET ORAL EVERY 6 HOURS PRN
Status: DISCONTINUED | OUTPATIENT
Start: 2018-10-09 | End: 2018-10-11

## 2018-10-09 RX ORDER — MIDAZOLAM HYDROCHLORIDE 1 MG/ML
1 INJECTION INTRAMUSCULAR; INTRAVENOUS EVERY 5 MIN PRN
Status: DISCONTINUED | OUTPATIENT
Start: 2018-10-09 | End: 2018-10-11

## 2018-10-09 RX ORDER — SODIUM CHLORIDE 9 MG/ML
INJECTION, SOLUTION INTRAVENOUS CONTINUOUS
Status: DISCONTINUED | OUTPATIENT
Start: 2018-10-09 | End: 2018-10-11

## 2018-10-09 RX ORDER — MIDAZOLAM HYDROCHLORIDE 1 MG/ML
INJECTION INTRAMUSCULAR; INTRAVENOUS
Status: COMPLETED
Start: 2018-10-09 | End: 2018-10-09

## 2018-10-09 RX ADMIN — HYDROCODONE BITARTRATE AND ACETAMINOPHEN 1 TABLET: 5; 325 TABLET ORAL at 16:35:00

## 2018-10-09 RX ADMIN — MIDAZOLAM HYDROCHLORIDE 1 MG: 1 INJECTION INTRAMUSCULAR; INTRAVENOUS at 15:15:00

## 2018-10-09 RX ADMIN — MIDAZOLAM HYDROCHLORIDE 1 MG: 1 INJECTION INTRAMUSCULAR; INTRAVENOUS at 15:30:00

## 2018-10-09 NOTE — IMAGING NOTE
Patient presented to CT with his wife for an abscessogram.  One drain was removed and the other drain shows that it needs to be placed back into the area of fluid collection.   Patient's affect is good and conversational.  The procedure was explained to the

## 2018-10-09 NOTE — PROCEDURES
[facility]  Procedure Note    Eddie Lassiter Patient Status:  Outpatient    2/15/1965 MRN [id number]   [facility] PCP Faiza Pizano MD     Procedure: Pelvic abscess drain    Pre-Pro

## 2018-10-10 ENCOUNTER — TELEPHONE (OUTPATIENT)
Dept: SURGERY | Facility: CLINIC | Age: 53
End: 2018-10-10

## 2018-10-10 NOTE — TELEPHONE ENCOUNTER
Pt had surgery, having constipation. Taking Norco for pain. Pt instructed to take miralax and may take a dose earlier. Pt instructed to increase ambulation as tolerated, increase fluid intake. Pt verbalizes understanding.

## 2018-10-12 ENCOUNTER — TELEPHONE (OUTPATIENT)
Dept: SURGERY | Facility: CLINIC | Age: 53
End: 2018-10-12

## 2018-10-12 NOTE — TELEPHONE ENCOUNTER
Pt called, states that his Dasie Corwith drain was replaced on 10/9/18 by IR after the initial drain had broke. He states that there has been a decrease in the output and was just concerned the drain may have come out again.  He states the bulb is still suc

## 2018-10-15 ENCOUNTER — TELEPHONE (OUTPATIENT)
Dept: SURGERY | Facility: CLINIC | Age: 53
End: 2018-10-15

## 2018-10-15 NOTE — TELEPHONE ENCOUNTER
Sebastian's wife called today wondering if he needed more antibiotics prescribed because the drainage he is getting from the drain is thick and brown. He is not running a fever or having chills.  I spoke to Dr. Rosailno Waters, she does not feel that he needs any more

## 2018-10-16 ENCOUNTER — HOSPITAL ENCOUNTER (OUTPATIENT)
Dept: CT IMAGING | Facility: HOSPITAL | Age: 53
Discharge: HOME | End: 2018-10-16
Attending: PHYSICIAN ASSISTANT
Payer: COMMERCIAL

## 2018-10-16 ENCOUNTER — TELEPHONE (OUTPATIENT)
Dept: SURGERY | Facility: CLINIC | Age: 53
End: 2018-10-16

## 2018-10-16 DIAGNOSIS — K65.1 PERITONEAL ABSCESS (HCC): ICD-10-CM

## 2018-10-16 DIAGNOSIS — K65.1 ABDOMINAL VISCERAL ABSCESS (HCC): ICD-10-CM

## 2018-10-16 DIAGNOSIS — K57.92 DIVERTICULITIS: Primary | ICD-10-CM

## 2018-10-16 DIAGNOSIS — K65.1 ABSCESS OF ABDOMINAL CAVITY (HCC): ICD-10-CM

## 2018-10-16 PROCEDURE — 76080 X-RAY EXAM OF FISTULA: CPT | Performed by: PHYSICIAN ASSISTANT

## 2018-10-16 PROCEDURE — 49424 ASSESS CYST CONTRAST INJECT: CPT | Performed by: PHYSICIAN ASSISTANT

## 2018-10-16 RX ORDER — LEVOFLOXACIN 500 MG/1
750 TABLET, FILM COATED ORAL DAILY
Qty: 21 TABLET | Refills: 0 | Status: SHIPPED | OUTPATIENT
Start: 2018-10-16 | End: 2018-10-25

## 2018-10-16 RX ORDER — METRONIDAZOLE 500 MG/1
500 TABLET ORAL 3 TIMES DAILY
Qty: 42 TABLET | Refills: 0 | Status: SHIPPED | OUTPATIENT
Start: 2018-10-16 | End: 2018-10-25

## 2018-10-16 NOTE — IMAGING NOTE
Patient presented to CT with wife. CT shows that there is still a fistula with the diverticula. Drain will remain in for another 10 days per Dr. Sunshine Mohan. All patient's questions about drain and care were answered.   Dressing changed with sterile dressing a

## 2018-10-16 NOTE — TELEPHONE ENCOUNTER
I discussed with the patient his CT results from today. The collection is smaller, but still present. I have asked him to resume taking antibiotics times 2 weeks. He will repeat the CT scan in 10 days. I will see him back November 5 for evaluation.

## 2018-10-17 ENCOUNTER — TELEPHONE (OUTPATIENT)
Dept: SURGERY | Facility: CLINIC | Age: 53
End: 2018-10-17

## 2018-10-17 NOTE — PROGRESS NOTES
Initial Post Discharge Follow Up   Discharge Date: 8/22/18  Contact Date: 10/17/2018    Consent Verification:  Assessment Completed With: Patient  HIPAA Verified?   Yes    Discharge Dx:   Degenerative joint disease of knee, left, s/p Left Total Knee Arthr Department [facility])    Oct 25, 2018  8:00 AM CDT CT INJECTION ABSCESS with [address] [facility])    Prior to your arrival: If you suspect you may be pregnant, please consult with your physician prior to you [address]  [place] [address]  [place] [id number] [facility] Gregory Abrams [place]  [facility]  [address]

## 2018-10-17 NOTE — TELEPHONE ENCOUNTER
Pt has intra-abdominal drain in place for abscess and is scheduled for US of prostate tomorrow and he is wondering if this is ok.  Informed pt that he should check with US to make sure they are able to complete the test and that they would press on his abdo

## 2018-10-17 NOTE — PROGRESS NOTES
Contacted the pt for condition update after knee surgery, pt stated he is on the phone with another doctor's office therefore requested a call back. NCM to FU.

## 2018-10-25 ENCOUNTER — HOSPITAL ENCOUNTER (OUTPATIENT)
Dept: CT IMAGING | Facility: HOSPITAL | Age: 53
Discharge: HOME | End: 2018-10-25
Attending: SURGERY
Payer: COMMERCIAL

## 2018-10-25 ENCOUNTER — TELEPHONE (OUTPATIENT)
Dept: SURGERY | Facility: CLINIC | Age: 53
End: 2018-10-25

## 2018-10-25 DIAGNOSIS — K65.1 ABSCESS OF ABDOMINAL CAVITY (HCC): ICD-10-CM

## 2018-10-25 DIAGNOSIS — K57.92 DIVERTICULITIS: ICD-10-CM

## 2018-10-25 PROCEDURE — 49424 ASSESS CYST CONTRAST INJECT: CPT | Performed by: SURGERY

## 2018-10-25 PROCEDURE — 76080 X-RAY EXAM OF FISTULA: CPT | Performed by: SURGERY

## 2018-10-25 RX ORDER — LEVOFLOXACIN 750 MG/1
750 TABLET ORAL DAILY
Qty: 10 TABLET | Refills: 0 | Status: SHIPPED | OUTPATIENT
Start: 2018-10-25 | End: 2018-11-04

## 2018-10-25 RX ORDER — METRONIDAZOLE 500 MG/1
500 TABLET ORAL 3 TIMES DAILY
Qty: 30 TABLET | Refills: 0 | Status: SHIPPED | OUTPATIENT
Start: 2018-10-25 | End: 2018-11-04

## 2018-10-25 NOTE — PROCEDURES
[facility]  Procedure Note    Rushie Meth Patient Status:  Outpatient    2/15/1965 MRN [id number]   [facility] CT Attending Schuyler Wright MD   Hosp Day # 0 PCP Oral Devlin MD     Procedure: CT abscessogram    Pre-Proce

## 2018-10-26 RX ORDER — ZOLPIDEM TARTRATE 10 MG/1
10 TABLET ORAL NIGHTLY PRN
Qty: 30 TABLET | Refills: 0 | Status: SHIPPED
Start: 2018-10-26 | End: 2018-11-26

## 2018-10-29 ENCOUNTER — OFFICE VISIT (OUTPATIENT)
Dept: FAMILY MEDICINE CLINIC | Facility: CLINIC | Age: 53
End: 2018-10-29
Payer: COMMERCIAL

## 2018-10-29 VITALS
DIASTOLIC BLOOD PRESSURE: 66 MMHG | TEMPERATURE: 99 F | HEART RATE: 90 BPM | BODY MASS INDEX: 30 KG/M2 | SYSTOLIC BLOOD PRESSURE: 108 MMHG | OXYGEN SATURATION: 98 % | WEIGHT: 230.25 LBS

## 2018-10-29 DIAGNOSIS — L02.91 ABSCESS: ICD-10-CM

## 2018-10-29 DIAGNOSIS — K57.20 DIVERTICULITIS OF LARGE INTESTINE WITH PERFORATION WITHOUT BLEEDING: Primary | ICD-10-CM

## 2018-10-29 PROCEDURE — 99214 OFFICE O/P EST MOD 30 MIN: CPT | Performed by: FAMILY MEDICINE

## 2018-11-01 NOTE — PROGRESS NOTES
Mook Craig is a 48year old male. Patient presents with:  Er F/u: diverticulitis. Misty Diaz pt reports he feels ok. . discuss drain. . room 6    Subjective   HPI:   Patient had been hospitalized for diverticulitis    He is on antibiotics  And also  Has a  to visit.       Past Medical History:   Diagnosis Date   • BPH (benign prostatic hyperplasia)    • Diverticulitis    • Essential hypertension    • High blood pressure    • HTN (hypertension)    • Osteoarthritis     knees   • Visual impairment     prescripti drain in place with brownish d-c in the bulb  EXTREMITIES: no cyanosis, clubbing or edema    RESULTS REVIEWED FROM PRIOR VISITS BY   DR Lito Monk       In the chart   See notes in EMR     ASSESSMENT AND PLAN:     Diverticulitis of large intestine with p

## 2018-11-05 ENCOUNTER — OFFICE VISIT (OUTPATIENT)
Dept: SURGERY | Facility: CLINIC | Age: 53
End: 2018-11-05
Payer: COMMERCIAL

## 2018-11-05 ENCOUNTER — HOSPITAL ENCOUNTER (OUTPATIENT)
Dept: CT IMAGING | Facility: HOSPITAL | Age: 53
Discharge: HOME | End: 2018-11-05
Attending: RADIOLOGY
Payer: COMMERCIAL

## 2018-11-05 VITALS
HEART RATE: 74 BPM | DIASTOLIC BLOOD PRESSURE: 72 MMHG | SYSTOLIC BLOOD PRESSURE: 111 MMHG | WEIGHT: 229 LBS | RESPIRATION RATE: 16 BRPM | BODY MASS INDEX: 30 KG/M2

## 2018-11-05 DIAGNOSIS — K65.1 INTRA-ABDOMINAL ABSCESS (HCC): ICD-10-CM

## 2018-11-05 DIAGNOSIS — K57.92 DIVERTICULITIS: Primary | ICD-10-CM

## 2018-11-05 DIAGNOSIS — K57.92 ACUTE DIVERTICULITIS: ICD-10-CM

## 2018-11-05 DIAGNOSIS — K65.1 PERITONEAL ABSCESS (HCC): ICD-10-CM

## 2018-11-05 PROCEDURE — 76080 X-RAY EXAM OF FISTULA: CPT | Performed by: RADIOLOGY

## 2018-11-05 PROCEDURE — 99215 OFFICE O/P EST HI 40 MIN: CPT | Performed by: SURGERY

## 2018-11-05 PROCEDURE — 49424 ASSESS CYST CONTRAST INJECT: CPT | Performed by: RADIOLOGY

## 2018-11-05 RX ORDER — NEOMYCIN SULFATE 500 MG/1
TABLET ORAL
Qty: 6 TABLET | Refills: 0 | Status: ON HOLD | OUTPATIENT
Start: 2018-11-05 | End: 2018-11-19

## 2018-11-05 RX ORDER — METRONIDAZOLE 500 MG/1
TABLET ORAL
Qty: 3 TABLET | Refills: 0 | Status: ON HOLD | OUTPATIENT
Start: 2018-11-05 | End: 2018-11-19

## 2018-11-05 RX ORDER — POLYETHYLENE GLYCOL 3350, SODIUM CHLORIDE, SODIUM BICARBONATE, POTASSIUM CHLORIDE 420; 11.2; 5.72; 1.48 G/4L; G/4L; G/4L; G/4L
POWDER, FOR SOLUTION ORAL
Qty: 1 BOTTLE | Refills: 0 | Status: ON HOLD | OUTPATIENT
Start: 2018-11-05 | End: 2018-11-19

## 2018-11-05 NOTE — PROGRESS NOTES
Follow Up Visit Note       Active Problems      1. Diverticulitis    2. Acute diverticulitis    3. Intra-abdominal abscess [facility])          Chief Complaint   Patient presents with:   Follow - Up: ABDOMINAL DRAIN, ct FISTULOGRAM RESULTS        History of Prese • Hypertension Father    • Thyroid disease Mother    • Cancer Paternal Grandmother         breast     Social History    Socioeconomic History      Marital status:       Spouse name: Not on file      Number of children: Not on file      Years of ed of Systems  The Review of Systems has been reviewed by me during today. Review of Systems     Physical Findings   /72   Pulse 74   Resp 16   Wt 229 lb   BMI 30.21 kg/m²   Physical Exam   Constitutional: He is oriented to person, place, and time.  He immediate competitions. Remainder of the visualized upper abdomen has an essential unremarkable noncontrast appearance. CONCLUSION:  No significant change in the appearance of a small abscess cavity with fistula to the adjacent sigmoid colon.

## 2018-11-05 NOTE — PROCEDURES
[facility]  Procedure Note    Marco A Pretty Patient Status:  Outpatient    2/15/1965 MRN [id number]   [facility] CT Attending Dona Burns MD   Hosp Day # 0 PCP Carlyn Pizano MD     Procedure: CT abscessogram    Pre-Procedure Diagn

## 2018-11-06 ENCOUNTER — TELEPHONE (OUTPATIENT)
Dept: SURGERY | Facility: CLINIC | Age: 53
End: 2018-11-06

## 2018-11-06 RX ORDER — METRONIDAZOLE 500 MG/1
500 TABLET ORAL 3 TIMES DAILY
Qty: 30 TABLET | Refills: 0 | Status: ON HOLD | OUTPATIENT
Start: 2018-11-06 | End: 2018-11-19

## 2018-11-06 RX ORDER — LEVOFLOXACIN 750 MG/1
750 TABLET ORAL DAILY
Qty: 10 TABLET | Refills: 0 | Status: ON HOLD | OUTPATIENT
Start: 2018-11-06 | End: 2018-11-19

## 2018-11-06 NOTE — TELEPHONE ENCOUNTER
Pt called requesting a refill on his Levaquin and Flagyl to get him through until his sx date, 11/15/18. He did see Dr. Kathy Mann in office yesterday. I spoke with Efe Dawson PA-C, she approved of the refill.

## 2018-11-07 ENCOUNTER — TELEPHONE (OUTPATIENT)
Dept: SURGERY | Facility: CLINIC | Age: 53
End: 2018-11-07

## 2018-11-07 DIAGNOSIS — I10 ESSENTIAL HYPERTENSION: ICD-10-CM

## 2018-11-07 RX ORDER — LOSARTAN POTASSIUM 50 MG/1
TABLET ORAL
Qty: 30 TABLET | Refills: 0 | Status: ON HOLD | OUTPATIENT
Start: 2018-11-07 | End: 2018-11-15

## 2018-11-07 NOTE — TELEPHONE ENCOUNTER
Pt has abdominal drain and states there is blood in it for past 4 days. Pt then flushed it and thinks it is more bloody now with 30ml out in past 12 hrs. Spoke with surgeon. Pt denies SOB, dizziness. Explained we expect some blood in the drain.  Pt asked if

## 2018-11-08 ENCOUNTER — TELEPHONE (OUTPATIENT)
Dept: SURGERY | Facility: CLINIC | Age: 53
End: 2018-11-08

## 2018-11-09 ENCOUNTER — TELEPHONE (OUTPATIENT)
Dept: SURGERY | Facility: CLINIC | Age: 53
End: 2018-11-09

## 2018-11-09 ENCOUNTER — ANESTHESIA EVENT (OUTPATIENT)
Dept: SURGERY | Facility: HOSPITAL | Age: 53
DRG: 330 | End: 2018-11-09
Payer: COMMERCIAL

## 2018-11-09 NOTE — TELEPHONE ENCOUNTER
Pt's wife called, states there is a strong odor from the drain, he is on antibiotics, no fevers, just slight drainage at the insertion site, but that isn't new. She thinks its just the drain itself that is odorous from being in place so long.  She was Fredo

## 2018-11-15 ENCOUNTER — HOSPITAL ENCOUNTER (INPATIENT)
Facility: HOSPITAL | Age: 53
LOS: 4 days | Discharge: HOME | DRG: 330 | End: 2018-11-19
Attending: SURGERY | Admitting: SURGERY
Payer: COMMERCIAL

## 2018-11-15 ENCOUNTER — ANESTHESIA (OUTPATIENT)
Dept: SURGERY | Facility: HOSPITAL | Age: 53
DRG: 330 | End: 2018-11-15
Payer: COMMERCIAL

## 2018-11-15 DIAGNOSIS — K57.92 DIVERTICULITIS: ICD-10-CM

## 2018-11-15 DIAGNOSIS — Z98.890 S/P BLADDER REPAIR: Primary | ICD-10-CM

## 2018-11-15 PROCEDURE — S0028 INJECTION, FAMOTIDINE, 20 MG: HCPCS | Performed by: PHYSICIAN ASSISTANT

## 2018-11-15 PROCEDURE — 0DJD4ZZ INSPECTION OF LOWER INTESTINAL TRACT, PERCUTANEOUS ENDOSCOPIC APPROACH: ICD-10-PCS | Performed by: SURGERY

## 2018-11-15 PROCEDURE — 0DJD8ZZ INSPECTION OF LOWER INTESTINAL TRACT, VIA NATURAL OR ARTIFICIAL OPENING ENDOSCOPIC: ICD-10-PCS | Performed by: SURGERY

## 2018-11-15 PROCEDURE — 88307 TISSUE EXAM BY PATHOLOGIST: CPT | Performed by: SURGERY

## 2018-11-15 PROCEDURE — 0TQB0ZZ REPAIR BLADDER, OPEN APPROACH: ICD-10-PCS | Performed by: SURGERY

## 2018-11-15 PROCEDURE — S0077 INJECTION, CLINDAMYCIN PHOSP: HCPCS | Performed by: PHYSICIAN ASSISTANT

## 2018-11-15 PROCEDURE — 0DBN0ZZ EXCISION OF SIGMOID COLON, OPEN APPROACH: ICD-10-PCS | Performed by: SURGERY

## 2018-11-15 PROCEDURE — 0DBP0ZZ EXCISION OF RECTUM, OPEN APPROACH: ICD-10-PCS | Performed by: SURGERY

## 2018-11-15 RX ORDER — ONDANSETRON 2 MG/ML
4 INJECTION INTRAMUSCULAR; INTRAVENOUS AS NEEDED
Status: DISCONTINUED | OUTPATIENT
Start: 2018-11-15 | End: 2018-11-15 | Stop reason: HOSPADM

## 2018-11-15 RX ORDER — HYDROMORPHONE HYDROCHLORIDE 1 MG/ML
0.4 INJECTION, SOLUTION INTRAMUSCULAR; INTRAVENOUS; SUBCUTANEOUS EVERY 2 HOUR PRN
Status: DISCONTINUED | OUTPATIENT
Start: 2018-11-15 | End: 2018-11-15 | Stop reason: SDUPTHER

## 2018-11-15 RX ORDER — LOSARTAN POTASSIUM 50 MG/1
50 TABLET ORAL DAILY
COMMUNITY
End: 2019-04-22

## 2018-11-15 RX ORDER — DOCUSATE SODIUM 100 MG/1
100 CAPSULE, LIQUID FILLED ORAL 2 TIMES DAILY PRN
Status: DISCONTINUED | OUTPATIENT
Start: 2018-11-15 | End: 2018-11-19

## 2018-11-15 RX ORDER — MEPERIDINE HYDROCHLORIDE 25 MG/ML
12.5 INJECTION INTRAMUSCULAR; INTRAVENOUS; SUBCUTANEOUS AS NEEDED
Status: DISCONTINUED | OUTPATIENT
Start: 2018-11-15 | End: 2018-11-15 | Stop reason: HOSPADM

## 2018-11-15 RX ORDER — ACETAMINOPHEN 500 MG
1000 TABLET ORAL EVERY 8 HOURS
Status: DISCONTINUED | OUTPATIENT
Start: 2018-11-15 | End: 2018-11-19

## 2018-11-15 RX ORDER — MORPHINE SULFATE 4 MG/ML
INJECTION, SOLUTION INTRAMUSCULAR; INTRAVENOUS
Status: COMPLETED
Start: 2018-11-15 | End: 2018-11-15

## 2018-11-15 RX ORDER — FAMOTIDINE 20 MG/1
20 TABLET ORAL 2 TIMES DAILY
Status: DISCONTINUED | OUTPATIENT
Start: 2018-11-15 | End: 2018-11-19

## 2018-11-15 RX ORDER — ONDANSETRON 2 MG/ML
4 INJECTION INTRAMUSCULAR; INTRAVENOUS EVERY 4 HOURS PRN
Status: DISCONTINUED | OUTPATIENT
Start: 2018-11-15 | End: 2018-11-19

## 2018-11-15 RX ORDER — HEPARIN SODIUM 5000 [USP'U]/ML
5000 INJECTION, SOLUTION INTRAVENOUS; SUBCUTANEOUS ONCE
Status: COMPLETED | OUTPATIENT
Start: 2018-11-15 | End: 2018-11-15

## 2018-11-15 RX ORDER — ALFUZOSIN HYDROCHLORIDE 10 MG/1
10 TABLET, EXTENDED RELEASE ORAL
Status: DISCONTINUED | OUTPATIENT
Start: 2018-11-16 | End: 2018-11-19

## 2018-11-15 RX ORDER — LABETALOL HYDROCHLORIDE 5 MG/ML
5 INJECTION, SOLUTION INTRAVENOUS EVERY 5 MIN PRN
Status: DISCONTINUED | OUTPATIENT
Start: 2018-11-15 | End: 2018-11-15 | Stop reason: HOSPADM

## 2018-11-15 RX ORDER — MORPHINE SULFATE 4 MG/ML
2 INJECTION, SOLUTION INTRAMUSCULAR; INTRAVENOUS EVERY 5 MIN PRN
Status: DISCONTINUED | OUTPATIENT
Start: 2018-11-15 | End: 2018-11-15 | Stop reason: HOSPADM

## 2018-11-15 RX ORDER — LOSARTAN POTASSIUM 50 MG/1
50 TABLET ORAL DAILY
Status: DISCONTINUED | OUTPATIENT
Start: 2018-11-15 | End: 2018-11-19

## 2018-11-15 RX ORDER — GABAPENTIN 300 MG/1
300 CAPSULE ORAL ONCE
Status: COMPLETED | OUTPATIENT
Start: 2018-11-15 | End: 2018-11-15

## 2018-11-15 RX ORDER — FAMOTIDINE 10 MG/ML
20 INJECTION, SOLUTION INTRAVENOUS 2 TIMES DAILY
Status: DISCONTINUED | OUTPATIENT
Start: 2018-11-15 | End: 2018-11-19

## 2018-11-15 RX ORDER — HYDROMORPHONE HYDROCHLORIDE 1 MG/ML
0.8 INJECTION, SOLUTION INTRAMUSCULAR; INTRAVENOUS; SUBCUTANEOUS EVERY 2 HOUR PRN
Status: DISCONTINUED | OUTPATIENT
Start: 2018-11-15 | End: 2018-11-15 | Stop reason: SDUPTHER

## 2018-11-15 RX ORDER — GABAPENTIN 300 MG/1
300 CAPSULE ORAL NIGHTLY
Status: DISCONTINUED | OUTPATIENT
Start: 2018-11-15 | End: 2018-11-19

## 2018-11-15 RX ORDER — SODIUM CHLORIDE, SODIUM LACTATE, POTASSIUM CHLORIDE, CALCIUM CHLORIDE 600; 310; 30; 20 MG/100ML; MG/100ML; MG/100ML; MG/100ML
INJECTION, SOLUTION INTRAVENOUS CONTINUOUS
Status: DISCONTINUED | OUTPATIENT
Start: 2018-11-15 | End: 2018-11-15 | Stop reason: HOSPADM

## 2018-11-15 RX ORDER — SODIUM CHLORIDE, SODIUM LACTATE, POTASSIUM CHLORIDE, CALCIUM CHLORIDE 600; 310; 30; 20 MG/100ML; MG/100ML; MG/100ML; MG/100ML
INJECTION, SOLUTION INTRAVENOUS CONTINUOUS
Status: DISCONTINUED | OUTPATIENT
Start: 2018-11-15 | End: 2018-11-19

## 2018-11-15 RX ORDER — MORPHINE SULFATE 4 MG/ML
1 INJECTION, SOLUTION INTRAMUSCULAR; INTRAVENOUS EVERY 2 HOUR PRN
Status: DISCONTINUED | OUTPATIENT
Start: 2018-11-15 | End: 2018-11-19

## 2018-11-15 RX ORDER — HEPARIN SODIUM 5000 [USP'U]/ML
5000 INJECTION, SOLUTION INTRAVENOUS; SUBCUTANEOUS EVERY 8 HOURS SCHEDULED
Status: DISCONTINUED | OUTPATIENT
Start: 2018-11-16 | End: 2018-11-19

## 2018-11-15 RX ORDER — SODIUM CHLORIDE, SODIUM LACTATE, POTASSIUM CHLORIDE, CALCIUM CHLORIDE 600; 310; 30; 20 MG/100ML; MG/100ML; MG/100ML; MG/100ML
INJECTION, SOLUTION INTRAVENOUS CONTINUOUS
Status: DISCONTINUED | OUTPATIENT
Start: 2018-11-15 | End: 2018-11-15

## 2018-11-15 RX ORDER — MORPHINE SULFATE 4 MG/ML
2 INJECTION, SOLUTION INTRAMUSCULAR; INTRAVENOUS EVERY 2 HOUR PRN
Status: DISCONTINUED | OUTPATIENT
Start: 2018-11-15 | End: 2018-11-19

## 2018-11-15 RX ORDER — HYDROMORPHONE HYDROCHLORIDE 1 MG/ML
0.2 INJECTION, SOLUTION INTRAMUSCULAR; INTRAVENOUS; SUBCUTANEOUS EVERY 2 HOUR PRN
Status: DISCONTINUED | OUTPATIENT
Start: 2018-11-15 | End: 2018-11-15 | Stop reason: SDUPTHER

## 2018-11-15 RX ORDER — MORPHINE SULFATE 4 MG/ML
4 INJECTION, SOLUTION INTRAMUSCULAR; INTRAVENOUS EVERY 2 HOUR PRN
Status: DISCONTINUED | OUTPATIENT
Start: 2018-11-15 | End: 2018-11-19

## 2018-11-15 RX ORDER — MAGNESIUM OXIDE 400 MG (241.3 MG MAGNESIUM) TABLET
400 TABLET DAILY
Status: DISCONTINUED | OUTPATIENT
Start: 2018-11-15 | End: 2018-11-19

## 2018-11-15 RX ORDER — ZOLPIDEM TARTRATE 5 MG/1
5 TABLET ORAL NIGHTLY
Status: DISCONTINUED | OUTPATIENT
Start: 2018-11-15 | End: 2018-11-19

## 2018-11-15 RX ORDER — ACETAMINOPHEN 500 MG
1000 TABLET ORAL ONCE
Status: DISCONTINUED | OUTPATIENT
Start: 2018-11-15 | End: 2018-11-15 | Stop reason: HOSPADM

## 2018-11-15 RX ORDER — CLINDAMYCIN PHOSPHATE 600 MG/50ML
600 INJECTION INTRAVENOUS EVERY 8 HOURS
Status: COMPLETED | OUTPATIENT
Start: 2018-11-15 | End: 2018-11-16

## 2018-11-15 RX ORDER — NALOXONE HYDROCHLORIDE 0.4 MG/ML
80 INJECTION, SOLUTION INTRAMUSCULAR; INTRAVENOUS; SUBCUTANEOUS AS NEEDED
Status: DISCONTINUED | OUTPATIENT
Start: 2018-11-15 | End: 2018-11-15 | Stop reason: HOSPADM

## 2018-11-15 RX ORDER — CLINDAMYCIN PHOSPHATE 900 MG/50ML
900 INJECTION INTRAVENOUS ONCE
Status: COMPLETED | OUTPATIENT
Start: 2018-11-15 | End: 2018-11-15

## 2018-11-15 NOTE — PLAN OF CARE
Admission Navigator completed with exception of home medications as is being addressed by pharmacy. Pt drowsy, but able to participate some in answering admission questions. Handoff report given to [facility] .

## 2018-11-15 NOTE — H&P
History & Physical Examination    Patient Name: Jeremy Higgins  MRN: [id number]  CSN: [id number]  YOB: 1965    Diagnosis: Perforated diverticulitis    Present Illness:  The patient is a 19-year-old gentleman who follows up regarding his di metRONIDAZOLE (FLAGYL) 500 MG Oral Tab Take 1 tablet at 1pm, 2pm and 11pm Disp: 3 tablet Rfl: 0 11/14/2018 at 2300   Docusate Sodium (COLACE OR) Take by mouth daily.    Disp:  Rfl:  11/13/2018   Zolpidem Tartrate 10 MG Oral Tab Take 1 tablet (10 mg total) Never Used    Alcohol use:  Yes      Alcohol/week: 3.0 oz      Types: 5 Cans of beer per week      Comment: rare      SYSTEM Check if Review is Normal Check if Physical Exam is Normal If not normal, please explain:   HEENT [x ] [x ]    NECK & BACK [x ] [x ]

## 2018-11-15 NOTE — PROGRESS NOTES
PATIENT HAS IV FLUIDS INFUSING, ON ROOM AIR, TOLERATING A CLEAR LIQUID DIET SO FAR, BLOOD PRESSURE LOW AT TIMES-COZAAR HELD, LAMA IN PLACE AND IS NOT TO BE REMOVED, IV CLEOCIN SCHEDULED, INCISIONS ARE C/D/I, LOUANN DRAIN X1 WITH SEROSANGUINOUS DRAINAGE, WILL

## 2018-11-15 NOTE — ANESTHESIA POSTPROCEDURE EVALUATION
[facility] Patient Status:  Surgery Admit   Age/Gender 48year old male MRN [id number]   [facility] SURGERY Attending Jody Renee MD   Hosp Day # 0 PCP Angelique Austin MD       Anesthesia Post-op Note    Proc

## 2018-11-15 NOTE — OPERATIVE REPORT
PREOPERATIVE DIAGNOSIS: Recurrent diverticulitis. POSTOPERATIVE DIAGNOSIS: Recurrent diverticulitis. PROCEDURE PERFORMED: Attempted laparoscopic, converted to open, low anterior resection of the rectosigmoid colon and repair of bladder injury.    Jesika Ports incision was made just superior to the umbilicus with an 11 blade scalpel. The Veress needle was passed into the abdomen and pneumoperitoneum was instituted to a pressure of 15 without difficulty. The 5 mm trocar was then passed through this incision site. water.  No leaks were noted. Attention was returned to the colon. Further inflammatory adhesions were taken down using cautery. The colon was then mobilized medially. An area of normal colon proximally was identified.   This was transected with a TA 60 noted.  The abdomen was copiously irrigated with normal saline. The fascia at the 12 mm port site was reapproximated with a Maxon suture. The posterior fascia was reapproximated with a #1 Vicryl suture.   The anterior fascia was reapproximated using a loo

## 2018-11-15 NOTE — ANESTHESIA PREPROCEDURE EVALUATION
PRE-OP EVALUATION    Patient Name: Jane Gee    Pre-op Diagnosis: Diverticulitis [K57.92]    Procedure(s):  LAPAROSCOPIC LOW ANTERIOR  OF THE RECTOSIGMOID COLON, POSSIBLE OPEN PROCEDURE    Surgeon(s) and Role:     Lawrence Patricia MD - Kyle [facility] COLONOSCOPY  04/03/2017   • KNEE REPLACEMENT SURGERY      Aug 2018,Left knee   • KNEE TOTAL REPLACEMENT Left 8/21/2018    Performed by Ralph Fortune MD at [facility] MAIN OR   • OTHER SURGICAL HISTORY      LEFT KNEE ACL RECONSTRUCTION   • TONSILLECTOMY       Soc

## 2018-11-16 PROCEDURE — 97530 THERAPEUTIC ACTIVITIES: CPT

## 2018-11-16 PROCEDURE — S0077 INJECTION, CLINDAMYCIN PHOSP: HCPCS | Performed by: PHYSICIAN ASSISTANT

## 2018-11-16 PROCEDURE — 97162 PT EVAL MOD COMPLEX 30 MIN: CPT

## 2018-11-16 PROCEDURE — 97165 OT EVAL LOW COMPLEX 30 MIN: CPT

## 2018-11-16 PROCEDURE — S0028 INJECTION, FAMOTIDINE, 20 MG: HCPCS | Performed by: PHYSICIAN ASSISTANT

## 2018-11-16 RX ORDER — KETOROLAC TROMETHAMINE 15 MG/ML
15 INJECTION, SOLUTION INTRAMUSCULAR; INTRAVENOUS EVERY 6 HOURS PRN
Status: ACTIVE | OUTPATIENT
Start: 2018-11-16 | End: 2018-11-18

## 2018-11-16 RX ORDER — KETOROLAC TROMETHAMINE 30 MG/ML
30 INJECTION, SOLUTION INTRAMUSCULAR; INTRAVENOUS EVERY 6 HOURS PRN
Status: DISPENSED | OUTPATIENT
Start: 2018-11-16 | End: 2018-11-18

## 2018-11-16 NOTE — PAYOR COMM NOTE
--------------  ADMISSION REVIEW         11/15  DIRECT FOR OR                      PREOPERATIVE DIAGNOSIS: Recurrent diverticulitis. POSTOPERATIVE DIAGNOSIS: Recurrent diverticulitis.    PROCEDURE PERFORMED: Attempted laparoscopic, converted to open, low

## 2018-11-16 NOTE — PHYSICAL THERAPY NOTE
PHYSICAL THERAPY QUICK EVALUATION - INPATIENT    Room Number: 945/145-D  Evaluation Date: 11/16/2018  Presenting Problem: s/p attempted laparoscopic, converted to open, low anterior resection of the rectosigmoid colon and repair of bladder injury.    Phys INPATIENT SHORT FORM - BASIC MOBILITY  How much difficulty does the patient currently have. ..  -   Turning over in bed (including adjusting bedclothes, sheets and blankets)?: None   -   Sitting down on and standing up from a chair with arms (e.g., wheelcha this evaluation, patient's clinical presentation is evolving and overall evaluation complexity is considered moderate. Pt appears most limited during PT eval by pain. Anticipate with better pain control, pt will be able to improve activity.   PT Discharge

## 2018-11-16 NOTE — OCCUPATIONAL THERAPY NOTE
OCCUPATIONAL THERAPY QUICK EVALUATION - INPATIENT    Room Number: 311/165-E  Evaluation Date: 11/16/2018     Type of Evaluation: Quick Eval  Presenting Problem: s/p attempted laparoscopic, converted to open, low anterior resection of the rectosigmoid colon PT for L TKA Aug 18'. SUBJECTIVE  Pt reports stabbing pain in stomach c/o pain 10/10. Patient self-stated goal is to return to bed.     OBJECTIVE  Precautions: Drain(s)  Fall Risk: Standard fall risk    WEIGHT BEARING RESTRICTION  Weight Bearing Restr ~25ft before requesting to return to room d/t pain. Pt returned to room and performed bed mobility sit>supine with supervision. Pt was left in bed with questions answered, needs met and call light/personal items within reach.  RN made aware of pt status and Occupational Therapy services. Please re-order if a new functional limitation presents during this admission.     Patient was able to achieve the following goals:  Patient able to toilet transfer: at previous functional level  Patient able to dress lower e

## 2018-11-16 NOTE — PLAN OF CARE
PT RESTING IN BED. ABD SOFT AND TENDER. BS ACTIVE. REPORTS FLATUS. PT ENCOURAGED TO SPEND MORE TIME IN CHAIR AND AMBULATE IRVIN. LAMA WITH CLEAR YELLOW URINE. POC UPDATED,PT VERBALIZED UNDERSTANDING.

## 2018-11-16 NOTE — PROGRESS NOTES
[facility]  Progress Note    Connie Acuña Patient Status:  Inpatient    2/15/1965 MRN [id number]   [facility] 3NW-A Attending Tasha Chopra MD   [facility] Day # 1 PCP Emmie Runner Gleason, MD     Subjective:  No new complaints.  Pain 9 with conversion to open procedure, and repair of bladder injury  Expected Ileus has not yet resolved    Plan:  1. Continue lofton catheter  2. Continue clear liquid diet. May advance to full liquids later today if appetite improves  3. Increase activity  4.

## 2018-11-17 NOTE — PROGRESS NOTES
[facility]  Progress Note    Felicita Estimable Patient Status:  Inpatient    2/15/1965 MRN [id number]   [facility] 3NW-A Attending Yue Roque MD   [facility] Day # 2 PCP Steffen Pizano MD     Subjective:  Pt passing a little flatu

## 2018-11-17 NOTE — PROGRESS NOTES
RESUMED CARE AT 1300. ALERT AND ORIENT X 4 , ON ROOM AIR . DENIES ANY SOB OR CHEST PAIN . ABDOMEN SOFT AND BS HYPO , NOT PASSING GAS . TOLERATED CLEAR LIQUID DIET . DENIES ANY NAUSEA OR VOMITING . LAMA DRAINING CLEAR YELLOW URINE .  LOUANN X 1 , ABDOMINAL DRES

## 2018-11-17 NOTE — PLAN OF CARE
Maintains or returns to baseline bowel function Not Progressing      Minimal or absence of nausea and vomiting Progressing      Maintains adequate nutritional intake (undernourished) Progressing      Achieves appropriate nutritional intake (bariatric) Prog

## 2018-11-17 NOTE — PLAN OF CARE
Problem: PAIN - ADULT  Goal: Verbalizes/displays adequate comfort level or patient's stated pain goal  INTERVENTIONS:  - Encourage pt to monitor pain and request assistance  - Assess pain using appropriate pain scale  - Administer analgesics based on type and empty bladder  - Monitor intake/output and perform bladder scan as needed  - Follow urinary retention protocol/standard of care  - Consider collaborating with pharmacy to review patient's medication profile  - Implement strategies to promote bladder em

## 2018-11-18 RX ORDER — IBUPROFEN 400 MG/1
400 TABLET ORAL EVERY 6 HOURS PRN
Status: DISCONTINUED | OUTPATIENT
Start: 2018-11-18 | End: 2018-11-19

## 2018-11-18 RX ORDER — HYDROCODONE BITARTRATE AND ACETAMINOPHEN 5; 325 MG/1; MG/1
1 TABLET ORAL EVERY 4 HOURS PRN
Status: DISCONTINUED | OUTPATIENT
Start: 2018-11-18 | End: 2018-11-19

## 2018-11-18 RX ORDER — HYDROCODONE BITARTRATE AND ACETAMINOPHEN 5; 325 MG/1; MG/1
2 TABLET ORAL EVERY 4 HOURS PRN
Status: DISCONTINUED | OUTPATIENT
Start: 2018-11-18 | End: 2018-11-19

## 2018-11-18 NOTE — PROGRESS NOTES
[facility]  Progress Note    Waldo Magallanes Patient Status:  Inpatient    2/15/1965 MRN [id number]   [facility] 3NW-A Attending Donnis Ormond, MD   Hosp Day # 3 PCP Soheila Hanna MD     Subjective:  Feels ok.   Pain varies in

## 2018-11-18 NOTE — PLAN OF CARE
Minimal or absence of nausea and vomiting Progressing      Maintains or returns to baseline bowel function Progressing      Maintains adequate nutritional intake (undernourished) Progressing      Achieves appropriate nutritional intake (bariatric) Progress

## 2018-11-18 NOTE — PLAN OF CARE
Problem: Patient/Family Goals  Goal: Patient/Family Short Term Goal  Patient's Short Term Goal: PREPARE FOR DISCHARGE HOME    Interventions:   - ADVANCE DIET AS TOLERATED  -TRANSITION IV TO ORAL PAIN MEDICATION  -ENCOURAGE AMBULATION  - See additional Care functional activity level and precautions during self-care   Outcome: Progressing  Up ad darlin.   Gait steady    Problem: GENITOURINARY - ADULT  Goal: Absence of urinary retention  INTERVENTIONS:  - Assess patient’s ability to void and empty bladder  [facility]

## 2018-11-19 VITALS
RESPIRATION RATE: 18 BRPM | DIASTOLIC BLOOD PRESSURE: 69 MMHG | TEMPERATURE: 98 F | BODY MASS INDEX: 29.16 KG/M2 | SYSTOLIC BLOOD PRESSURE: 124 MMHG | OXYGEN SATURATION: 98 % | HEART RATE: 68 BPM | WEIGHT: 220 LBS | HEIGHT: 73 IN

## 2018-11-19 RX ORDER — HYDROCODONE BITARTRATE AND ACETAMINOPHEN 5; 325 MG/1; MG/1
TABLET ORAL
Qty: 30 TABLET | Refills: 0 | Status: SHIPPED | OUTPATIENT
Start: 2018-11-19 | End: 2018-11-26 | Stop reason: ALTCHOICE

## 2018-11-19 NOTE — PLAN OF CARE
Iv discontinued. Discharge instructions given and reviewed. Patient and wife verbalized understanding and all questions answered.

## 2018-11-19 NOTE — PROGRESS NOTES
[facility]  Progress Note    Errol Rivera Patient Status:  Inpatient    2/15/1965 MRN [id number]   [facility] 3NW-A Attending Sigifredo De Guzman MD   [facility] Day # 4 PCP Travis Pizano MD     Subjective:  Pt had a BM last night.

## 2018-11-19 NOTE — PLAN OF CARE
Assumed care for this patient at 0730: patient alert and oriented. S/p pod #4  Colon resection. Complains of pain 6/10. Prn norco given. jackelin drain discontinued. Midline incision intact with staples. Discussed lofton care at discharge.  Patient and wife verbal

## 2018-11-19 NOTE — PAYOR COMM NOTE
--------------  DISCHARGE REVIEW    Payor: [address] #:  [id number]  Authorization Number: [id number]    Admit date: 11/15/18  Admit time:  1410  Discharge Date: 11/19/2018  1:53 PM     Admitting Physician: Nicolás Solorzano MD  At

## 2018-11-20 ENCOUNTER — APPOINTMENT (OUTPATIENT)
Dept: GENERAL RADIOLOGY | Facility: HOSPITAL | Age: 53
DRG: 330 | End: 2018-11-20
Attending: SURGERY
Payer: COMMERCIAL

## 2018-11-23 ENCOUNTER — HOSPITAL ENCOUNTER (OUTPATIENT)
Dept: GENERAL RADIOLOGY | Facility: HOSPITAL | Age: 53
Discharge: HOME | End: 2018-11-23
Attending: SURGERY
Payer: COMMERCIAL

## 2018-11-23 ENCOUNTER — TELEPHONE (OUTPATIENT)
Dept: SURGERY | Facility: CLINIC | Age: 53
End: 2018-11-23

## 2018-11-23 DIAGNOSIS — Z98.890 S/P BLADDER REPAIR: ICD-10-CM

## 2018-11-23 PROCEDURE — 74450 X-RAY URETHRA/BLADDER: CPT | Performed by: SURGERY

## 2018-11-23 PROCEDURE — 51610 INJECTION FOR BLADDER X-RAY: CPT | Performed by: SURGERY

## 2018-11-23 NOTE — TELEPHONE ENCOUNTER
Pt phoned office, has questions regarding adding carbonated beverages to his diet. Instructed to limit his daily consumption, but he can try to add to his diet as tolerated, Vebalizes understanding.

## 2018-11-26 ENCOUNTER — OFFICE VISIT (OUTPATIENT)
Dept: SURGERY | Facility: CLINIC | Age: 53
End: 2018-11-26

## 2018-11-26 VITALS
HEART RATE: 83 BPM | TEMPERATURE: 99 F | SYSTOLIC BLOOD PRESSURE: 119 MMHG | HEIGHT: 73 IN | WEIGHT: 218.38 LBS | BODY MASS INDEX: 28.94 KG/M2 | RESPIRATION RATE: 16 BRPM | DIASTOLIC BLOOD PRESSURE: 76 MMHG

## 2018-11-26 DIAGNOSIS — K57.20 DIVERTICULITIS OF LARGE INTESTINE WITH PERFORATION AND ABSCESS WITHOUT BLEEDING: Primary | ICD-10-CM

## 2018-11-26 DIAGNOSIS — K65.1 INTRA-ABDOMINAL ABSCESS (HCC): ICD-10-CM

## 2018-11-26 PROBLEM — K57.92 ACUTE DIVERTICULITIS: Status: RESOLVED | Noted: 2018-10-01 | Resolved: 2018-11-26

## 2018-11-26 PROCEDURE — 99024 POSTOP FOLLOW-UP VISIT: CPT | Performed by: SURGERY

## 2018-11-26 RX ORDER — ZOLPIDEM TARTRATE 10 MG/1
TABLET ORAL
Qty: 30 TABLET | Refills: 0 | Status: SHIPPED
Start: 2018-11-26 | End: 2018-12-09

## 2018-11-26 NOTE — PROGRESS NOTES
Post Operative Visit Note       Active Problems  1. Diverticulitis of large intestine with perforation and abscess without bleeding    2.  Intra-abdominal abscess [facility])         Chief Complaint   Patient presents with:  Post-Op: p/o LAPAROSCOPIC AATEMPTED, O by Hao Hilton MD at [address] [place] [address] - LEFT N/A 11/15/2018    Performed by Priyanka Molina MD at [facility] MAIN OR   • OTHER SURGICAL HISTORY      LEFT KNEE ACL RECONSTRUCTION   • TONSILLECTOMY         The family history and s for hearing loss, nosebleeds, sore throat and trouble swallowing. Respiratory: Negative for apnea, cough, shortness of breath and wheezing. Cardiovascular: Negative for chest pain, palpitations and leg swelling.    Gastrointestinal: Negative for abdom PATHOLOGY   I reviewed the pathology report and provided a copy to the patient.     Final Diagnosis:   Rectosigmoid colon:  -Segment of colon (18 cm in length) with diverticulosis, chronic diverticulitis, focal microscopic perforation with serosal microab

## 2018-11-28 NOTE — DISCHARGE SUMMARY
[facility]  Discharge Summary    Radha Bartlett Patient Status:  Inpatient    2/15/1965 MRN [id number]   [facility] 3NW-A Attending No att. providers found   [address] Day # 4 PCP González Freeman MD     Date of Admission: 11/15/2018    D Cooperative. No apparent distress. Abdomen:  Soft, non-distended, minimal incisional tenderness, with no rebound or guarding. No peritoneal signs. Incisions:  Clean, dry, intact without erythema.       Consultations: PT/OT    Complications: none     Dis

## 2018-12-03 ENCOUNTER — MED REC SCAN ONLY (OUTPATIENT)
Dept: FAMILY MEDICINE CLINIC | Facility: CLINIC | Age: 53
End: 2018-12-03

## 2018-12-04 DIAGNOSIS — I10 ESSENTIAL HYPERTENSION: ICD-10-CM

## 2018-12-04 RX ORDER — LOSARTAN POTASSIUM 50 MG/1
TABLET ORAL
Qty: 30 TABLET | Refills: 3 | Status: SHIPPED | OUTPATIENT
Start: 2018-12-04 | End: 2018-12-09

## 2018-12-09 ENCOUNTER — HOSPITAL ENCOUNTER (OUTPATIENT)
Age: 53
Discharge: ANOTHER HOSPITAL | End: 2018-12-09
Attending: FAMILY MEDICINE
Payer: COMMERCIAL

## 2018-12-09 ENCOUNTER — HOSPITAL ENCOUNTER (INPATIENT)
Facility: HOSPITAL | Age: 53
LOS: 5 days | Discharge: HOME HEALTH | DRG: 862 | End: 2018-12-14
Attending: EMERGENCY MEDICINE | Admitting: HOSPITALIST
Payer: COMMERCIAL

## 2018-12-09 ENCOUNTER — APPOINTMENT (OUTPATIENT)
Dept: CT IMAGING | Facility: HOSPITAL | Age: 53
DRG: 862 | End: 2018-12-09
Attending: EMERGENCY MEDICINE
Payer: COMMERCIAL

## 2018-12-09 VITALS
HEIGHT: 73 IN | BODY MASS INDEX: 28.49 KG/M2 | SYSTOLIC BLOOD PRESSURE: 137 MMHG | DIASTOLIC BLOOD PRESSURE: 89 MMHG | WEIGHT: 215 LBS | RESPIRATION RATE: 18 BRPM | OXYGEN SATURATION: 100 % | TEMPERATURE: 98 F | HEART RATE: 95 BPM

## 2018-12-09 DIAGNOSIS — R10.84 GENERALIZED ABDOMINAL PAIN: Primary | ICD-10-CM

## 2018-12-09 DIAGNOSIS — T81.43XA INTRA-ABDOMINAL ABSCESS POST-PROCEDURE: Primary | ICD-10-CM

## 2018-12-09 PROBLEM — D64.9 ANEMIA: Status: ACTIVE | Noted: 2018-12-09

## 2018-12-09 PROBLEM — K65.1 INTRA-ABDOMINAL ABSCESS POST-PROCEDURE (HCC): Status: ACTIVE | Noted: 2018-12-09

## 2018-12-09 PROCEDURE — 99223 1ST HOSP IP/OBS HIGH 75: CPT | Performed by: INTERNAL MEDICINE

## 2018-12-09 PROCEDURE — 74177 CT ABD & PELVIS W/CONTRAST: CPT | Performed by: EMERGENCY MEDICINE

## 2018-12-09 PROCEDURE — 99212 OFFICE O/P EST SF 10 MIN: CPT

## 2018-12-09 PROCEDURE — 99213 OFFICE O/P EST LOW 20 MIN: CPT

## 2018-12-09 RX ORDER — HYDROCODONE BITARTRATE AND ACETAMINOPHEN 5; 325 MG/1; MG/1
1 TABLET ORAL EVERY 6 HOURS PRN
Status: DISCONTINUED | OUTPATIENT
Start: 2018-12-09 | End: 2018-12-14

## 2018-12-09 RX ORDER — MORPHINE SULFATE 4 MG/ML
1 INJECTION, SOLUTION INTRAMUSCULAR; INTRAVENOUS EVERY 2 HOUR PRN
Status: DISCONTINUED | OUTPATIENT
Start: 2018-12-09 | End: 2018-12-14

## 2018-12-09 RX ORDER — ONDANSETRON 2 MG/ML
4 INJECTION INTRAMUSCULAR; INTRAVENOUS EVERY 4 HOURS PRN
Status: DISCONTINUED | OUTPATIENT
Start: 2018-12-09 | End: 2018-12-09

## 2018-12-09 RX ORDER — LOSARTAN POTASSIUM 50 MG/1
50 TABLET ORAL DAILY
Status: DISCONTINUED | OUTPATIENT
Start: 2018-12-09 | End: 2018-12-14

## 2018-12-09 RX ORDER — ONDANSETRON 2 MG/ML
4 INJECTION INTRAMUSCULAR; INTRAVENOUS EVERY 6 HOURS PRN
Status: DISCONTINUED | OUTPATIENT
Start: 2018-12-09 | End: 2018-12-14

## 2018-12-09 RX ORDER — HYDROCODONE BITARTRATE AND ACETAMINOPHEN 5; 325 MG/1; MG/1
1 TABLET ORAL EVERY 6 HOURS PRN
Status: ON HOLD | COMMUNITY
End: 2018-12-13

## 2018-12-09 RX ORDER — LEVOFLOXACIN 5 MG/ML
750 INJECTION, SOLUTION INTRAVENOUS ONCE
Status: COMPLETED | OUTPATIENT
Start: 2018-12-09 | End: 2018-12-09

## 2018-12-09 RX ORDER — DOCUSATE SODIUM 100 MG/1
100 CAPSULE, LIQUID FILLED ORAL 2 TIMES DAILY PRN
Status: DISCONTINUED | OUTPATIENT
Start: 2018-12-09 | End: 2018-12-14

## 2018-12-09 RX ORDER — METRONIDAZOLE 500 MG/100ML
500 INJECTION, SOLUTION INTRAVENOUS EVERY 8 HOURS
Status: DISCONTINUED | OUTPATIENT
Start: 2018-12-10 | End: 2018-12-12

## 2018-12-09 RX ORDER — HYDROMORPHONE HYDROCHLORIDE 1 MG/ML
0.5 INJECTION, SOLUTION INTRAMUSCULAR; INTRAVENOUS; SUBCUTANEOUS EVERY 30 MIN PRN
Status: ACTIVE | OUTPATIENT
Start: 2018-12-09 | End: 2018-12-09

## 2018-12-09 RX ORDER — SODIUM CHLORIDE 9 MG/ML
INJECTION, SOLUTION INTRAVENOUS CONTINUOUS
Status: DISCONTINUED | OUTPATIENT
Start: 2018-12-09 | End: 2018-12-12

## 2018-12-09 RX ORDER — METRONIDAZOLE 500 MG/100ML
500 INJECTION, SOLUTION INTRAVENOUS EVERY 8 HOURS
Status: DISCONTINUED | OUTPATIENT
Start: 2018-12-09 | End: 2018-12-09

## 2018-12-09 RX ORDER — MORPHINE SULFATE 4 MG/ML
4 INJECTION, SOLUTION INTRAMUSCULAR; INTRAVENOUS EVERY 30 MIN PRN
Status: DISCONTINUED | OUTPATIENT
Start: 2018-12-09 | End: 2018-12-09

## 2018-12-09 RX ORDER — SODIUM CHLORIDE 9 MG/ML
INJECTION, SOLUTION INTRAVENOUS CONTINUOUS
Status: ACTIVE | OUTPATIENT
Start: 2018-12-09 | End: 2018-12-09

## 2018-12-09 RX ORDER — MORPHINE SULFATE 4 MG/ML
2 INJECTION, SOLUTION INTRAMUSCULAR; INTRAVENOUS EVERY 2 HOUR PRN
Status: DISCONTINUED | OUTPATIENT
Start: 2018-12-09 | End: 2018-12-14

## 2018-12-09 RX ORDER — ZOLPIDEM TARTRATE 10 MG/1
10 TABLET ORAL NIGHTLY PRN
Status: DISCONTINUED | OUTPATIENT
Start: 2018-12-09 | End: 2018-12-14

## 2018-12-09 RX ORDER — ZOLPIDEM TARTRATE 10 MG/1
10 TABLET ORAL NIGHTLY PRN
COMMUNITY
End: 2018-12-18

## 2018-12-09 RX ORDER — METRONIDAZOLE 500 MG/100ML
500 INJECTION, SOLUTION INTRAVENOUS ONCE
Status: COMPLETED | OUTPATIENT
Start: 2018-12-09 | End: 2018-12-09

## 2018-12-09 RX ORDER — IBUPROFEN 600 MG/1
600 TABLET ORAL EVERY 6 HOURS PRN
COMMUNITY
End: 2019-06-27 | Stop reason: ALTCHOICE

## 2018-12-09 RX ORDER — LEVOFLOXACIN 5 MG/ML
500 INJECTION, SOLUTION INTRAVENOUS ONCE
Status: DISCONTINUED | OUTPATIENT
Start: 2018-12-09 | End: 2018-12-09 | Stop reason: DRUGHIGH

## 2018-12-09 RX ORDER — ALFUZOSIN HYDROCHLORIDE 10 MG/1
10 TABLET, EXTENDED RELEASE ORAL
Status: DISCONTINUED | OUTPATIENT
Start: 2018-12-10 | End: 2018-12-14

## 2018-12-09 RX ORDER — LEVOFLOXACIN 5 MG/ML
750 INJECTION, SOLUTION INTRAVENOUS EVERY 24 HOURS
Status: DISCONTINUED | OUTPATIENT
Start: 2018-12-10 | End: 2018-12-12

## 2018-12-09 RX ORDER — DOCUSATE SODIUM 100 MG/1
100 CAPSULE, LIQUID FILLED ORAL 2 TIMES DAILY PRN
COMMUNITY
End: 2018-12-17 | Stop reason: DRUGHIGH

## 2018-12-09 RX ORDER — ACETAMINOPHEN 325 MG/1
650 TABLET ORAL EVERY 6 HOURS PRN
Status: DISCONTINUED | OUTPATIENT
Start: 2018-12-09 | End: 2018-12-14

## 2018-12-09 RX ORDER — MORPHINE SULFATE 4 MG/ML
4 INJECTION, SOLUTION INTRAMUSCULAR; INTRAVENOUS EVERY 2 HOUR PRN
Status: DISCONTINUED | OUTPATIENT
Start: 2018-12-09 | End: 2018-12-14

## 2018-12-09 RX ORDER — SODIUM CHLORIDE 9 MG/ML
125 INJECTION, SOLUTION INTRAVENOUS CONTINUOUS
Status: DISCONTINUED | OUTPATIENT
Start: 2018-12-09 | End: 2018-12-11

## 2018-12-09 RX ORDER — HEPARIN SODIUM 5000 [USP'U]/ML
5000 INJECTION, SOLUTION INTRAVENOUS; SUBCUTANEOUS EVERY 12 HOURS SCHEDULED
Status: DISCONTINUED | OUTPATIENT
Start: 2018-12-09 | End: 2018-12-14

## 2018-12-09 NOTE — ED INITIAL ASSESSMENT (HPI)
Arrives with c/o LLQ pain that began on Tuesday. Within the last day or so, has had pain to the RLQ as well. Reports low grade temp of 99.8 at home. Underwent abdominal surgery 11/15 for diverticulitis. Last antipyretic at 0300 today.

## 2018-12-09 NOTE — ED PROVIDER NOTES
Patient Seen in: [id number]    History   No chief complaint on file.     Stated Complaint: Abdominal Pain/Pulling-Post Surgical 3 weeks ago    HPI    *61-year-old male who presents to the immediate care today with chief complaints of Former Smoker        Years: 30.00        Quit date: 2/11/2015        Years since quitting: 3.8      Smokeless tobacco: Never Used    Alcohol use: Yes      Alcohol/week: 3.0 oz      Types: 5 Cans of beer per week      Comment: \"weekends\"    Drug use:  No post partial colon resection presenting with generalized abdominal pain. Multiple etiologies discussed in detail. Patient was instructed by surgeon to proceed to the emergency room.   Patient informed that he likely has to go to the ER for further evaluat

## 2018-12-09 NOTE — ED INITIAL ASSESSMENT (HPI)
Pt had colon resection on 11/15. Pt states bladder was stuck to colon and had to be fixed. Pt is in physical therapy for knee and was told could return. Pt states started with pain to left side of incision starting on Tuesday.   Pt states pain has been g

## 2018-12-10 ENCOUNTER — APPOINTMENT (OUTPATIENT)
Dept: CT IMAGING | Facility: HOSPITAL | Age: 53
DRG: 862 | End: 2018-12-10
Attending: SURGERY
Payer: COMMERCIAL

## 2018-12-10 PROBLEM — N40.0 BPH (BENIGN PROSTATIC HYPERPLASIA): Chronic | Status: ACTIVE | Noted: 2018-12-10

## 2018-12-10 PROCEDURE — 0W9F30Z DRAINAGE OF ABDOMINAL WALL WITH DRAINAGE DEVICE, PERCUTANEOUS APPROACH: ICD-10-PCS | Performed by: RADIOLOGY

## 2018-12-10 PROCEDURE — 99153 MOD SED SAME PHYS/QHP EA: CPT | Performed by: SURGERY

## 2018-12-10 PROCEDURE — 99232 SBSQ HOSP IP/OBS MODERATE 35: CPT | Performed by: HOSPITALIST

## 2018-12-10 PROCEDURE — 49406 IMAGE CATH FLUID PERI/RETRO: CPT | Performed by: SURGERY

## 2018-12-10 PROCEDURE — 99152 MOD SED SAME PHYS/QHP 5/>YRS: CPT | Performed by: SURGERY

## 2018-12-10 PROCEDURE — 99254 IP/OBS CNSLTJ NEW/EST MOD 60: CPT | Performed by: SURGERY

## 2018-12-10 RX ORDER — MIDAZOLAM HYDROCHLORIDE 1 MG/ML
INJECTION INTRAMUSCULAR; INTRAVENOUS
Status: COMPLETED
Start: 2018-12-10 | End: 2018-12-10

## 2018-12-10 RX ORDER — SODIUM CHLORIDE 9 MG/ML
INJECTION, SOLUTION INTRAVENOUS CONTINUOUS
Status: DISCONTINUED | OUTPATIENT
Start: 2018-12-10 | End: 2018-12-10 | Stop reason: HOSPADM

## 2018-12-10 RX ORDER — FLUMAZENIL 0.1 MG/ML
0.2 INJECTION, SOLUTION INTRAVENOUS AS NEEDED
Status: DISCONTINUED | OUTPATIENT
Start: 2018-12-10 | End: 2018-12-10 | Stop reason: HOSPADM

## 2018-12-10 RX ORDER — MIDAZOLAM HYDROCHLORIDE 1 MG/ML
1 INJECTION INTRAMUSCULAR; INTRAVENOUS EVERY 5 MIN PRN
Status: DISCONTINUED | OUTPATIENT
Start: 2018-12-10 | End: 2018-12-10 | Stop reason: HOSPADM

## 2018-12-10 RX ORDER — NALOXONE HYDROCHLORIDE 0.4 MG/ML
80 INJECTION, SOLUTION INTRAMUSCULAR; INTRAVENOUS; SUBCUTANEOUS AS NEEDED
Status: DISCONTINUED | OUTPATIENT
Start: 2018-12-10 | End: 2018-12-10 | Stop reason: HOSPADM

## 2018-12-10 NOTE — H&P
[facility]                                                               History & Physical         Arline Favorite Patient Status:  Inpatient    2/15/1965 MRN [id number]   [facility] 3NE-A Attending Linda Medina MD   [facility] Day history:   reports that he quit smoking about 3 years ago. He quit after 30.00 years of use. he has never used smokeless tobacco. He reports that he drinks about 3.0 oz of alcohol per week. He reports that he does not use drugs.     Allergies:    Augmentin temperature source Oral, resp. rate 20, height 6' 1\" (1.854 m), weight 207 lb 10.8 oz (94.2 kg), SpO2 99 %. General: No acute distress. HEENT: Moist mucous membranes. EOM-I. PERRL  Neck: No lymphadenopathy. No JVD. No carotid bruits.   Respiratory: Stevan information is   transmitted to the ACR ([employer]) NRDR ([address]) which includes the Dose Index Registry.      PATIENT STATED HISTORY:(As transcribed by Technologist)  Patient with bilateral lower quadrant abdom abscess appears represent 1 cavity with anterior extension in the midline pelvis and posterior extension between the bladder and rectum. This appears amenable to percutaneous drainage. 2.  Interval sigmoid resection. 3.  Healing hepatic abscess.

## 2018-12-10 NOTE — CONSULTS
Pharmacy note re:  Levaquin    The dose of Levaquin was adjusted to 750 mg IV X 1 dose as per the antibiotic dosing protocol

## 2018-12-10 NOTE — PROGRESS NOTES
[facility]  Progress Note     Coit Leavens Patient Status:  Inpatient    2/15/1965 MRN [id number]   [facility] 3NE-A Attending Kevin Boone MD   [facility] Day # 1 PCP Audie Gutierres MD     Chief Complaint: abd pain/ fevers    S: Pa Oral Daily with breakfast   • Losartan Potassium  50 mg Oral Daily   • hydrocortisone  1 Application Rectal BID   • Heparin Sodium (Porcine)  5,000 Units Subcutaneous 2 times per day   • levofloxacin  750 mg Intravenous Q24H   • metRONIDAZOLE  500 mg Intra

## 2018-12-10 NOTE — ED PROVIDER NOTES
Patient Seen in: [facility] 3ne-a    History   Patient presents with:  Abdomen/Flank Pain (GI/)    Stated Complaint: abdominal pain    HPI    Patient is a 73-year-old male who has a history of bowel resection for diverticulitis.   Patient at this t complaint: abdominal pain  Other systems are as noted in HPI. Constitutional and vital signs reviewed. All other systems reviewed and negative except as noted above.     Physical Exam     ED Triage Vitals   BP 12/09/18 1739 118/80   Pulse 12/09/18 173 panel order CBC WITH DIFFERENTIAL WITH PLATELET.   Procedure                               Abnormality         Status                     ---------                               -----------         ------                     CBC W/ DIFFERENTIAL[[id number]]

## 2018-12-10 NOTE — PAYOR COMM NOTE
--------------  ADMISSION REVIEW     Payor: [address] #:  [id number]  Authorization Number: [id number]    Admit date: 12/9/18  Admit time: 2037       Admitting Physician: Mabel Gomez MD  Attending Physician:  Marcelo Queen MD  Primary Narrative: The following orders were created for panel order CBC WITH DIFFERENTIAL WITH PLATELET.   Procedure                               Abnormality         Status                     ---------                               -----------         ------ HYDROcodone-acetaminophen (NORCO) 5-325 MG per tab 1 tablet     Date Action Dose Route User    12/9/2018 2235 Given 1 tablet Oral Qi Rubio, RN      iohexol (OMNIPAQUE) 350 MG/ML injection 100 mL     Date Action Dose Route User    12/9/2018 1851 Gi results of the CT scan and the pathology that is occurring at this time. It was explained to the patient the need for drainage of this abscess and will be attempted by CT drainage at this time.   It was explained to them the possibility of needing future s

## 2018-12-10 NOTE — CONSULTS
[facility]  Report of Consultation    Quentin Meth Patient Status:  Inpatient    2/15/1965 MRN [id number]   [facility] 3NE-A Attending Joesph Maradiaga MD   [facility] Day # 1 PCP Oral Devlin MD     Reason for Consultation:  Ozzy mckinney assessment and plan. The physician performed all medical decision making.     Rika Goodman PA-C    History:  Past Medical History:   Diagnosis Date   • BPH (benign prostatic hyperplasia)    • Diverticulitis    • Essential hypertension    • High blood p Zolpidem Tartrate (AMBIEN) tab 10 mg, 10 mg, Oral, Nightly PRN  •  0.9%  NaCl infusion, , Intravenous, Continuous  •  Heparin Sodium (Porcine) 5000 UNIT/ML injection 5,000 Units, 5,000 Units, Subcutaneous, 2 times per day  •  acetaminophen (TYLENOL) tab 65 Mucous membranes are moist. EOM are intact. Neck: No tenderness to palpitation. Full range of motion to flexion and extension, lateral rotation and lateral flexion of cervical spine. Supple. Lungs: No respiratory distress.  No wheezes, no rales, no INJECTION VIA EXISTING CATH (CPT=76080/74806), 11/05/2018, 10:34. [facility], CT APPENDIX ABD/PEL W CONTRAST (SZF=56563), 10/01/2018, 14:52.      INDICATIONS:  abdominal pain     TECHNIQUE:  CT scanning was performed from the dome of th Midline infraumbilical incision. URINARY BLADDER:  Inflammatory changes between the bladder and fluid collection described above. No generalized bladder wall thickening  PELVIC NODES:  No adenopathy. PELVIC ORGANS:  No visible mass.   Pelvic organs rosy [address], [place]  12/10/2018  9:18 AM    Vilma Chun MD    Addendum:  I, Dr. Osiris Quiroga, personally performed the services described in this documentation, as scribed by Lakeisha Carbajal PA-C, in my presence, and it is both accurate

## 2018-12-10 NOTE — PLAN OF CARE
NURSING ADMISSION NOTE      Patient admitted via Cart  Oriented to room. Safety precautions initiated. Bed in low position. Call light in reach. Admission navigator completed. A&O x 4. On RA tolerating well.  PRN morphine and Norco given for abdom

## 2018-12-11 PROCEDURE — 99232 SBSQ HOSP IP/OBS MODERATE 35: CPT | Performed by: SURGERY

## 2018-12-11 PROCEDURE — 99232 SBSQ HOSP IP/OBS MODERATE 35: CPT | Performed by: HOSPITALIST

## 2018-12-11 RX ORDER — MAGNESIUM CARB/ALUMINUM HYDROX 105-160MG
296 TABLET,CHEWABLE ORAL ONCE
Status: COMPLETED | OUTPATIENT
Start: 2018-12-11 | End: 2018-12-11

## 2018-12-11 RX ORDER — BISACODYL 10 MG
10 SUPPOSITORY, RECTAL RECTAL
Status: DISCONTINUED | OUTPATIENT
Start: 2018-12-11 | End: 2018-12-14

## 2018-12-11 RX ORDER — MAGNESIUM CARB/ALUMINUM HYDROX 105-160MG
296 TABLET,CHEWABLE ORAL ONCE
Status: DISCONTINUED | OUTPATIENT
Start: 2018-12-11 | End: 2018-12-14

## 2018-12-11 NOTE — PROGRESS NOTES
[facility]  Progress Note    Eddie Lassiter Patient Status:  Inpatient    2/15/1965 MRN [id number]   [facility] 3NE-A Attending Chava Ovalle MD   Hosp Day # 2 PCP Faiza Pizano MD     Subjective:  Pt with some \"heaviness\" after

## 2018-12-11 NOTE — PROCEDURES
Complex fluid collection in lower abd-pelvis. 10F pigtail placed. 85 ml clotted dk blood, not grossly infected. Prob liquefying hematoma. Sample sent to lab. Comp-none.

## 2018-12-11 NOTE — CM/SW NOTE
Patient was screened during rounds, no needs were identified at this time. Patient and wife are managing drain at home. Hx of jose. received call from [facility]., can be called for any d/c planning needs [phone].     Rn to contact SW/CM if nee

## 2018-12-11 NOTE — PLAN OF CARE
DISCHARGE PLANNING    • Discharge to home or other facility with appropriate resources Progressing        GASTROINTESTINAL - ADULT    • Maintains or returns to baseline bowel function Progressing        METABOLIC/FLUID AND ELECTROLYTES - ADULT    • Electro

## 2018-12-11 NOTE — CONSULTS
INFECTIOUS DISEASE CONSULT NOTE    Arline Favorite Patient Status:  Inpatient    2/15/1965 MRN [id number]   [facility] 3NE-A Attending Donato Barajas MD   Hosp Day # 2 PCP Ila Vuong UNKNOWN  Sulfa Antibiotics       OTHER (SEE COMMENTS)    Comment:Throat felt like it was swollen and flushed    Medications:    Current Facility-Administered Medications:   •  magnesium citrate oral liquid 296 mL, 296 mL, Oral, Once  •  bisacodyl (DUL Clear to auscultation bilaterally. No wheezes. No rhonchi. Cardiovascular: S1, S2.  Regular rate and rhythm. No murmurs. Abdomen: Soft, mild tenderness around drain, nondistended. Positive bowel sounds. Drain with bloody output.  Midline incision is we CONTRAST (CPT=74177), 10/01/2018, 14:52. [place] , CT ABSCESS INJECTION VIA EXISTING CATH (CPT=76080/65091), 11/05/2018, 10:34.   INDICATIONS:  Z98.890 Other specified postprocedural states  PATIENT STATED HISTORY: (As transcribed by Technologist)  The gareth colon resection for diverticulitis.    CONTRAST USED:  100cc of Omnipaque 350  FINDINGS:  LIVER:  There is an ill-defined area of low attenuation in liver segment 4 corresponding to previously drained abscess, this is much smaller in size, measuring 3.9 x 2 Ct Drain Abscess Peritoneal (cpt=49406)    Result Date: 12/10/2018  PROCEDURE:  CT DRAIN ABSCESS PERITONEAL (CPT=49406)  COMPARISON:  [facility] , CT ABSCESS INJECTION VIA EXISTING CATH (CPT=76080/51812), 11/05/2018, 10:34.   [facility] , CT ABDOMEN+PELVI direct supervision. Continuous pulse oximetry and cardiac monitoring. Intravenous Versed and fentanyl were given by the Radiology nurse. An IV was present. Patency was maintained during the procedure. Recorded sedation time 23 min.   Dose reduction rajesh

## 2018-12-11 NOTE — PROGRESS NOTES
[facility]  Progress Note     Fortino Jovel Patient Status:  Inpatient    2/15/1965 MRN [id number]   [facility] 3NE-A Attending Rom Keller MD   [facility] Day # 2 PCP Elizabeth Foy MD     Chief Complaint: abd pain/ fevers    S: Pa input(s): TROP, CK in the last 168 hours. Imaging: Imaging data reviewed in Epic.     Medications:   • Alfuzosin HCl ER  10 mg Oral Daily with breakfast   • Losartan Potassium  50 mg Oral Daily   • hydrocortisone  1 Application Rectal BID   • Hepari

## 2018-12-11 NOTE — IMAGING NOTE
Patient tolerated CT guided LLQ abdominal drain placement procedure well, VSS on RA, presently denies pain, Sorvaview dressing is CDI.  Patient updated on procedure and plan of care, report called to [facility], RN and transport here to take patient back to room

## 2018-12-11 NOTE — PAYOR COMM NOTE
--------------  CONTINUED STAY REVIEW    Payor: [address] #:  [id number]  Authorization Number: [id number]    Admit date: 12/9/18  Admit time: 2037    Admitting Physician: Raquel Beard MD  Attending Physician:  MD Rach Antonio Piotr Márquez RN    12/11/2018 0452 New Bag 500 mg Intravenous Sunita Kessler RN    12/10/2018 2004 New Bag 500 mg Intravenous Sunita Kessler RN      Midazolam HCl (VERSED) 2 MG/2ML injection 1 mg     Date Action Dose Route User    12/10/2018 3347 Given 0.5

## 2018-12-11 NOTE — IMAGING NOTE
Perc drain of lower abd-pelvic complex fluid collection yesterday. 20 ml output since. Visual appearance c/w liquefying hematoma.   Sample sent for cx.  CPM.

## 2018-12-12 PROCEDURE — 99232 SBSQ HOSP IP/OBS MODERATE 35: CPT | Performed by: HOSPITALIST

## 2018-12-12 PROCEDURE — 99232 SBSQ HOSP IP/OBS MODERATE 35: CPT | Performed by: SURGERY

## 2018-12-12 NOTE — PAYOR COMM NOTE
--------------  CONTINUED STAY REVIEW    Payor: [address] #:  [id number]  Authorization Number: [id number]    Admit date: 12/9/18  Admit time: 2037    Admitting Physician: Mega Dai MD  Attending Physician:  Timothy Hewitt MD  [facility] Intravenous Maribell Diana RN      Zolpidem Tartrate (AMBIEN) tab 10 mg     Date Action Dose Route User    12/11/2018 2201 Given 10 mg Oral Littel Star Thompson RN          Plan: 12/11  .  Pelvic abscess in pt s/p LAR  - cover with eriberto for now  - s/p aspi

## 2018-12-12 NOTE — PROGRESS NOTES
[facility]  TEL: [phone]  FAX: [phone]    Radha Bartlett Patient Status:  Inpatient    2/15/1965 MRN [id number]   [facility] 3NE-A Attending Gail Garza MD   Hosp Day # 3 PCP Johnny Barnes BILT  0.5   --    TP  7.8   --        Microbiology    Reviewed in EMR,   Hospital Encounter on 12/09/18   1.  AEROBIC BACTERIAL CULTURE     Status: None (Preliminary result)    Collection Time: 12/10/18  5:20 PM   Result Value Ref Range    Aerobic Culture

## 2018-12-12 NOTE — PROGRESS NOTES
[facility]  Progress Note      Terri Snellen Patient Status:  Inpatient    2/15/1965 MRN [id number]   [facility] 3NE-A Attending Dieter Lo MD   Hosp Day # 3 PCP Moses Narvaez MD       48year-old male with pelvic fluid mago

## 2018-12-12 NOTE — PROGRESS NOTES
[facility]  Progress Note    Waldo Magallanes Patient Status:  Inpatient    2/15/1965 MRN [id number]   [facility] 3NE-A Attending Rasheeda Lezama MD   [facility] Day # 3 PCP Celestine Pizano MD     Subjective:  Pt exhausted after multiple BM

## 2018-12-12 NOTE — PROGRESS NOTES
[facility] HOSPITALIST  Progress Note     Jeremy Higgins Patient Status:  Inpatient    2/15/1965 MRN [id number]   [facility] 3NE-A Attending Amaya Ferrer MD   Hosp Day # 3 PCP Jenny James MD     Chief Complaint: abd pain/ fevers    S: Pa (L)).    Recent Labs   Lab  12/09/18   1745  12/10/18   0812   PTP  15.8*  17.1*   INR  1.21*  1.34*       No results for input(s): TROP, CK in the last 168 hours. Imaging: Imaging data reviewed in Epic.     Medications:   • meropenem  500 mg Edie Dubon tender near drain site, ND, BS+  EXT: No c/c    Imaging: Reviewed  Agree with above  Appreciate SX and IR input  appreciate Dr. Jennifer Garcia input  Cont meropenem and f/u cx     Efe Puckett MD

## 2018-12-13 PROCEDURE — 99232 SBSQ HOSP IP/OBS MODERATE 35: CPT | Performed by: HOSPITALIST

## 2018-12-13 PROCEDURE — 99232 SBSQ HOSP IP/OBS MODERATE 35: CPT | Performed by: SURGERY

## 2018-12-13 NOTE — PROGRESS NOTES
[facility]  Progress Note    Jamir Seo Patient Status:  Inpatient    2/15/1965 MRN [id number]   [facility] 3NE-A Attending Donato Garcia MD   Hosp Day # 4 PCP Richard Pizano MD     Subjective:  Pt tolerating diet.   No compla

## 2018-12-13 NOTE — PROGRESS NOTES
[address] [facility]  TEL: [phone]  FAX: [phone]    Lachelle Drea Patient Status:  Inpatient    2/15/1965 MRN [id number]   [facility] 3NE-A Attending Modesto Madera MD   Hosp Day # 4 PCP Renuka Blancas Microbiology    Reviewed in EMR,   Hospital Encounter on 12/09/18   1. ANAEROBIC CULTURE     Status: None (Preliminary result)    Collection Time: 12/10/18  5:20 PM   Result Value Ref Range    Anaerobic Culture Pending N/A   2.  AEROBIC BACTERIAL CULT

## 2018-12-13 NOTE — PROGRESS NOTES
[facility] H[facility]PITAL[facility]  Progress Note     Eino Never Patient Status:  Inpatient    2/15/1965 MRN [id number]   [facility] 3NE-A Attending Mela Bojorquez MD   Hosp Day # 4 PCP Bao Adjutant MD Jerica     Chief Complaint: abd pain/ fevers    S: Pa the last 168 hours. Imaging: Imaging data reviewed in Epic.     Medications:   • meropenem  500 mg Intravenous Q8H   • magnesium citrate  296 mL Oral Once   • Alfuzosin HCl ER  10 mg Oral Daily with breakfast   • Losartan Potassium  50 mg Oral Daily

## 2018-12-14 VITALS
TEMPERATURE: 98 F | OXYGEN SATURATION: 95 % | HEART RATE: 68 BPM | RESPIRATION RATE: 16 BRPM | HEIGHT: 73 IN | SYSTOLIC BLOOD PRESSURE: 120 MMHG | WEIGHT: 207.69 LBS | DIASTOLIC BLOOD PRESSURE: 72 MMHG | BODY MASS INDEX: 27.53 KG/M2

## 2018-12-14 PROCEDURE — 05H633Z INSERTION OF INFUSION DEVICE INTO LEFT SUBCLAVIAN VEIN, PERCUTANEOUS APPROACH: ICD-10-PCS | Performed by: HOSPITALIST

## 2018-12-14 PROCEDURE — 99239 HOSP IP/OBS DSCHRG MGMT >30: CPT | Performed by: HOSPITALIST

## 2018-12-14 PROCEDURE — 99232 SBSQ HOSP IP/OBS MODERATE 35: CPT | Performed by: SURGERY

## 2018-12-14 RX ORDER — SODIUM CHLORIDE 0.9 % (FLUSH) 0.9 %
10 SYRINGE (ML) INJECTION EVERY 12 HOURS
Status: DISCONTINUED | OUTPATIENT
Start: 2018-12-14 | End: 2018-12-14

## 2018-12-14 NOTE — CM/SW NOTE
jose [facility] has accepted [address]  P: [phone]  F: [phone]

## 2018-12-14 NOTE — PROGRESS NOTES
[facility]  Progress Note     Junior Rodriguez Patient Status:  Inpatient    2/15/1965 MRN [id number]   [facility] 3NE-A Attending Raven Teague MD   Hosp Day # 5 PCP Jackie Pizano MD     Chief Complaint: abd pain/ fevers    S: Pa 12/09/18   1745  12/10/18   0812   PTP  15.8*  17.1*   INR  1.21*  1.34*       No results for input(s): TROP, CK in the last 168 hours. Imaging: Imaging data reviewed in Epic.     Medications:   • ertapenem  1 g Intravenous Daily   • Normal Saline F MD

## 2018-12-14 NOTE — PROGRESS NOTES
[facility]  Progress Note      Lawrnce Elders Patient Status:  Inpatient    2/15/1965 MRN [id number]   [facility] 3NE-A Attending Charlie Barahona MD   Psychiatric Day # 4 PCP Shonda Pizano MD       Subjective:   Resting in bed    Objecti

## 2018-12-14 NOTE — PLAN OF CARE
NURSING DISCHARGE NOTE    Discharged Home via Wheelchair. Accompanied by Family member and Support staff  Belongings Taken by patient/family.     IV removed, discharge instructions given to patient with basic supplies for LOUANN dressing changes and irriga

## 2018-12-14 NOTE — PAYOR COMM NOTE
--------------  CONTINUED STAY REVIEW    Payor: JANES [address] #:  [id number]  Authorization Number: [id number]    Admit date: 12/9/18  Admit time: 2037    Admitting Physician: Dalila Ahumada, MD  Attending Physician:  Patricia Cuevas MD  [facility] taking abx for over 1 week. No angioedema  12/14  1. DC home once cleared by ID  2. CT fistulogram as outpatient, once drain output <10 cc per day, x 2 days. CT order has been entered.       PLEASE FAX DAYS CERTIFIED AND NEXT REVIEW DATE

## 2018-12-14 NOTE — PROGRESS NOTES
[facility]  Progress Note    Eino Never Patient Status:  Inpatient    2/15/1965 MRN [id number]   [facility] 3NE-A Attending Travon Walker MD   Hosp Day # 5 PCP Bao Adjutant MD Jerica     Subjective:  Pt with bladder pressure that

## 2018-12-14 NOTE — PLAN OF CARE
DISCHARGE PLANNING     • Discharge to home or other facility with appropriate resources Progressing           GASTROINTESTINAL - ADULT     • Minimal or absence of nausea and vomiting Progressing     • Maintains or returns to baseline bowel function Progres

## 2018-12-14 NOTE — CM/SW NOTE
Call placed to patient's  Kaycee Trevino [phone] regarding in network infusion company, await call back

## 2018-12-14 NOTE — DISCHARGE SUMMARY
[facility] HOSPITALIST  DISCHARGE SUMMARY     Mook Craig Patient Status:  Inpatient    2/15/1965 MRN [id number]   [facility] 3NE-A Attending Ya Cohn MD   [facility] Day # 5 PCP Shemar Baltazar MD     Date of Admission: 2018  Date cultures done-blood cultures were negative. Anaerobic culture from the abdomen negative, anaerobic culture has come back positive for Clostridium clostridiiforme -  Patient had initially been started on Flagyl and Levaquin.   ID was consulted due to histor the midline pelvis and posterior extension between the bladder and rectum. This appears amenable to percutaneous drainage. 2.  Interval sigmoid resection. 3.  Healing hepatic abscess.         Incidental or significant findings and recommendations (brief TAB/CAP      Take 1 tablet by mouth daily. Refills:  0     PROBIOTIC DAILY OR      Take 1 capsule by mouth daily. Refills:  0     tamsulosin HCl 0.4 MG Caps  Commonly known as:  FLOMAX      Take 0.4 mg by mouth 2 (two) times daily.    Refills:  0     Zo nondistended. Positive bowel sounds. No rebound or guarding. Incisions healing well  Draining midline lower abd center  serosang  drainage  To LOUANN   Neurologic: No focal neurological deficits. Musculoskeletal: Moves all extremities.   Extremities: No melody

## 2018-12-14 NOTE — PROGRESS NOTES
[address] [facility]  TEL: [phone]  FAX: [phone]    Lachelle Shepard Patient Status:  Inpatient    2/15/1965 MRN [id number]   [facility] 3NE-A Attending Modesto Madera MD   Hosp Day # 5 PCP Renuka Blancas in EMR,   Hospital Encounter on 12/09/18   1. ANAEROBIC CULTURE     Status: None (Preliminary result)    Collection Time: 12/10/18  5:20 PM   Result Value Ref Range    Anaerobic Culture Pending N/A   2.  AEROBIC BACTERIAL CULTURE     Status: None    Collect

## 2018-12-14 NOTE — CM/SW NOTE
In anticipation of discharge, order for invanz daily with supporting documents sent to [facility] care vis Spencer Phelps awaiting picc insertion to access for coverage and cost

## 2018-12-14 NOTE — CM/SW NOTE
Per option care--patient has met his out of pocket deductible--medication and supplies covered @ 100%.   Since jose [facility] used in past--will send referral

## 2018-12-14 NOTE — IMAGING NOTE
Poss home later today. Perc drain of prob infected hematoma. 13 ml output last 24 hrs. Recommend f/u CT abscessogram/fistulogram as an outpt once outputs are <20 ml for at least 2 days.

## 2018-12-17 ENCOUNTER — OFFICE VISIT (OUTPATIENT)
Dept: INTERNAL MEDICINE CLINIC | Facility: CLINIC | Age: 53
End: 2018-12-17
Payer: COMMERCIAL

## 2018-12-17 ENCOUNTER — PATIENT OUTREACH (OUTPATIENT)
Dept: INTERNAL MEDICINE CLINIC | Facility: CLINIC | Age: 53
End: 2018-12-17

## 2018-12-17 ENCOUNTER — TELEPHONE (OUTPATIENT)
Dept: FAMILY MEDICINE CLINIC | Facility: CLINIC | Age: 53
End: 2018-12-17

## 2018-12-17 VITALS
DIASTOLIC BLOOD PRESSURE: 78 MMHG | WEIGHT: 220.38 LBS | TEMPERATURE: 99 F | HEIGHT: 73 IN | BODY MASS INDEX: 29.21 KG/M2 | OXYGEN SATURATION: 98 % | HEART RATE: 78 BPM | RESPIRATION RATE: 16 BRPM | SYSTOLIC BLOOD PRESSURE: 122 MMHG

## 2018-12-17 DIAGNOSIS — T81.43XA INTRA-ABDOMINAL ABSCESS POST-PROCEDURE: ICD-10-CM

## 2018-12-17 DIAGNOSIS — I10 ESSENTIAL HYPERTENSION: ICD-10-CM

## 2018-12-17 DIAGNOSIS — K65.1 INTRA-ABDOMINAL ABSCESS (HCC): Primary | ICD-10-CM

## 2018-12-17 DIAGNOSIS — K57.92 DIVERTICULITIS: ICD-10-CM

## 2018-12-17 DIAGNOSIS — Z09 HOSPITAL DISCHARGE FOLLOW-UP: ICD-10-CM

## 2018-12-17 DIAGNOSIS — D64.9 ANEMIA, UNSPECIFIED TYPE: ICD-10-CM

## 2018-12-17 PROCEDURE — 85025 COMPLETE CBC W/AUTO DIFF WBC: CPT | Performed by: CLINICAL NURSE SPECIALIST

## 2018-12-17 PROCEDURE — 86140 C-REACTIVE PROTEIN: CPT | Performed by: CLINICAL NURSE SPECIALIST

## 2018-12-17 PROCEDURE — 80048 BASIC METABOLIC PNL TOTAL CA: CPT | Performed by: CLINICAL NURSE SPECIALIST

## 2018-12-17 PROCEDURE — 99495 TRANSJ CARE MGMT MOD F2F 14D: CPT | Performed by: CLINICAL NURSE SPECIALIST

## 2018-12-17 RX ORDER — POLYETHYLENE GLYCOL 3350 17 G/17G
17 POWDER, FOR SOLUTION ORAL DAILY PRN
Qty: 1 EACH | Refills: 0 | COMMUNITY
Start: 2018-12-17 | End: 2019-01-04 | Stop reason: ALTCHOICE

## 2018-12-17 RX ORDER — DOCUSATE SODIUM 100 MG/1
100 CAPSULE, LIQUID FILLED ORAL 2 TIMES DAILY
Qty: 30 CAPSULE | Refills: 0 | COMMUNITY
Start: 2018-12-17 | End: 2020-12-02 | Stop reason: ALTCHOICE

## 2018-12-17 NOTE — PATIENT INSTRUCTIONS
1. Please make an appointment with Dr. Rupali Valle  2. Contact the [facility] or Dr. Leonila Li office if constipation persists  3. Repeat CBC, CMP, and CRP in one week; [facility] RN to draw  4.  Call the [facility] with any pain, redness or unusual drainage from PICC line,

## 2018-12-17 NOTE — PROGRESS NOTES
TRANSITIONAL CARE CLINIC PHARMACIST  MEDICATION RECONCILIATION PROGRESS NOTE    Quentin Haque MRN [id number]    2/15/1965 PCP Oral Devlin MD     Quentin Haque is a 48year old male who presents to the [facility] for post discharge follow up after yet this season. Subjective:   Patient medication related concerns today include:      Ryan Dealgustavo asking whether or not the antibiotic could cause sleepiness. Mr. Ryan Laird denies forgetting or missing any doses of medications.  He uses a pillbox to [employer]

## 2018-12-17 NOTE — PROGRESS NOTES
David Barrett 6      HISTORY   CHIEF COMPLAINT: \"I am here to follow up after having a drain placed for a pocket of fluid\"  HPI: Marissa Lai 48year old male is here today for hospital follow up following carlos Meds:    Current Outpatient Medications on File Prior to Visit:  ertapenem 1 g 1 g in sodium chloride 0.9 % 100 mL Inject 1 g into the vein daily for 10 days.  Please check weekly cbc bmp and crp while on IV abx   Zolpidem Tartrate 10 MG Oral Tab Take 10 mg Mother    • Cancer Paternal Grandmother         breast      Social History    Tobacco Use      Smoking status: Former Smoker        Years: 30.00        Quit date: 2/11/2015        Years since quitting: 3.8      Smokeless tobacco: Never Used    [facility]   Checked CBC, BMP and CRP per D/C orders    Labs WNL, WBC 7.4, H/H 11.5/35.9 (improving), RBC 4.38 (improving), CRP 0.43    Continues with daily Ertapenem (12/14/18, scheduled date of completion)   To see Dr. Pablo Magallon on 12/19/18   To complete Ronna Andrews Consults:  Ct Abdomen+pelvis(contrast Only)(cpt=74177)    Result Date: 12/9/2018  CONCLUSION:  1. Complex pelvic abscess appears represent 1 cavity with anterior extension in the midline pelvis and posterior extension between the bladder and rectum.   This service period of discharge to 30 days:   · Number of Possible Diagnoses and/or Management Options: moderate  · Amount and/or Complexity of Data to Be Reviewed: moderate  · Risk of Significant Complications, Morbidity, and/or Mortality: moderate    Overall

## 2018-12-18 ENCOUNTER — TELEPHONE (OUTPATIENT)
Dept: INTERNAL MEDICINE CLINIC | Facility: CLINIC | Age: 53
End: 2018-12-18

## 2018-12-18 DIAGNOSIS — T81.43XA INTRA-ABDOMINAL ABSCESS POST-PROCEDURE: ICD-10-CM

## 2018-12-18 DIAGNOSIS — K65.1 INTRA-ABDOMINAL ABSCESS (HCC): Primary | ICD-10-CM

## 2018-12-18 RX ORDER — ZOLPIDEM TARTRATE 10 MG/1
10 TABLET ORAL NIGHTLY PRN
Qty: 30 TABLET | Refills: 0 | Status: SHIPPED
Start: 2018-12-18 | End: 2019-01-15

## 2018-12-18 NOTE — TELEPHONE ENCOUNTER
Spoke with Mackenzie Mackay this AM regarding need for repeat lab draws (CBC, BMP, CRP) on 12/24/18. Sofia Mackay will be seeing patient in the home setting on 12/24/18 and will draw these labs. She will report these labs back to Dr. Steph Foy for further orders.

## 2018-12-18 NOTE — PAYOR COMM NOTE
--------------  DISCHARGE REVIEW    Payor: JANES [address] #:  [id number]  Authorization Number: [id number]    Admit date: 12/9/18  Admit time:  2037  Discharge Date: 12/14/2018  6:17 PM     Admitting Physician: Bela Guerra MD  Attending DORIAN

## 2018-12-19 ENCOUNTER — OFFICE VISIT (OUTPATIENT)
Dept: SURGERY | Facility: CLINIC | Age: 53
End: 2018-12-19

## 2018-12-19 VITALS
HEART RATE: 77 BPM | BODY MASS INDEX: 29.16 KG/M2 | DIASTOLIC BLOOD PRESSURE: 81 MMHG | HEIGHT: 73 IN | SYSTOLIC BLOOD PRESSURE: 137 MMHG | WEIGHT: 220 LBS | TEMPERATURE: 98 F

## 2018-12-19 DIAGNOSIS — K57.20 DIVERTICULITIS OF LARGE INTESTINE WITH PERFORATION WITHOUT BLEEDING: ICD-10-CM

## 2018-12-19 DIAGNOSIS — T81.43XA INTRA-ABDOMINAL ABSCESS POST-PROCEDURE: Primary | ICD-10-CM

## 2018-12-19 PROCEDURE — 99024 POSTOP FOLLOW-UP VISIT: CPT | Performed by: SURGERY

## 2018-12-20 ENCOUNTER — TELEPHONE (OUTPATIENT)
Dept: INTERNAL MEDICINE CLINIC | Facility: CLINIC | Age: 53
End: 2018-12-20

## 2018-12-20 ENCOUNTER — HOSPITAL ENCOUNTER (OUTPATIENT)
Dept: CT IMAGING | Facility: HOSPITAL | Age: 53
Discharge: HOME | End: 2018-12-20
Attending: SURGERY
Payer: COMMERCIAL

## 2018-12-20 DIAGNOSIS — T81.43XA INTRA-ABDOMINAL ABSCESS POST-PROCEDURE: ICD-10-CM

## 2018-12-20 PROCEDURE — 49424 ASSESS CYST CONTRAST INJECT: CPT | Performed by: SURGERY

## 2018-12-20 PROCEDURE — 76080 X-RAY EXAM OF FISTULA: CPT | Performed by: SURGERY

## 2018-12-20 NOTE — PROGRESS NOTES
Post Operative Visit Note       Active Problems  1. Intra-abdominal abscess post-procedure    2.  Diverticulitis of large intestine with perforation without bleeding         Chief Complaint   Patient presents with:  ER F/U: SEEN IN ED intra-abdominal absces 8/21/2018    Performed by Luisa Doherty MD at [address] [place] [address] - LEFT N/A 11/15/2018    Performed by Marva Bower MD at [facility] MAIN OR   • OTHER SURGICAL HISTORY      LEFT KNEE ACL RECONSTRUCTION   • TONSILLECTOMY         T times daily as needed. Wash and dry rectal area. Massage small amount of cream to affected area. Use as directed. ) Disp: 1 Tube Rfl: 0   Losartan Potassium 50 MG Oral Tab Take 50 mg by mouth daily.  Disp:  Rfl:    Probiotic Product (PROBIOTIC DAILY OR) Ta no distension, no pulsatile liver, no fluid wave, no abdominal bruit, no ascites, no pulsatile midline mass and no mass. There is no hepatosplenomegaly. There is no tenderness.  There is no rigidity, no rebound, no guarding, no CVA tenderness, no tenderness

## 2018-12-20 NOTE — TELEPHONE ENCOUNTER
Patient contacted this writer regarding a request to have PICC line removed on 12/24/18. Spoke with Dr. Hortensia Pierre office. Dr. Roula Strauss is being paged and will determine plans for PICC.   Patient is aware Dr. Roula Strauss will review his case and determine when PICC ca

## 2019-01-04 ENCOUNTER — OFFICE VISIT (OUTPATIENT)
Dept: FAMILY MEDICINE CLINIC | Facility: CLINIC | Age: 54
End: 2019-01-04
Payer: COMMERCIAL

## 2019-01-04 VITALS
HEART RATE: 74 BPM | SYSTOLIC BLOOD PRESSURE: 120 MMHG | WEIGHT: 225 LBS | TEMPERATURE: 98 F | BODY MASS INDEX: 30 KG/M2 | OXYGEN SATURATION: 98 % | DIASTOLIC BLOOD PRESSURE: 70 MMHG

## 2019-01-04 DIAGNOSIS — J00 ACUTE NASOPHARYNGITIS: Primary | ICD-10-CM

## 2019-01-04 PROCEDURE — 99213 OFFICE O/P EST LOW 20 MIN: CPT | Performed by: FAMILY MEDICINE

## 2019-01-04 NOTE — PROGRESS NOTES
HPI:   Natalie Vicente is a 48year old male who presents for upper respiratory symptoms for  3  days. Patient reports congestion, yellow colored nasal discharge, dry cough, sinus pain, denies fever.       Current Outpatient Medications:  Zolpidem Tartr Alcohol use:  Yes      Alcohol/week: 3.0 oz      Types: 5 Cans of beer per week      Comment: \"weekends\"    Drug use: No        REVIEW OF SYSTEMS:   GENERAL: fever: no, chills:no, fatigue:  no  SKIN: no rashes  EYES:denies itching, discharge, irritation persist or worsen. Nicky Carbajal.  Millie Harris

## 2019-01-07 ENCOUNTER — OFFICE VISIT (OUTPATIENT)
Dept: FAMILY MEDICINE CLINIC | Facility: CLINIC | Age: 54
End: 2019-01-07
Payer: COMMERCIAL

## 2019-01-07 VITALS
DIASTOLIC BLOOD PRESSURE: 86 MMHG | WEIGHT: 225.38 LBS | OXYGEN SATURATION: 99 % | TEMPERATURE: 98 F | SYSTOLIC BLOOD PRESSURE: 130 MMHG | HEART RATE: 64 BPM | BODY MASS INDEX: 30 KG/M2

## 2019-01-07 DIAGNOSIS — R05.9 COUGH: Primary | ICD-10-CM

## 2019-01-07 DIAGNOSIS — J01.90 ACUTE RHINOSINUSITIS: ICD-10-CM

## 2019-01-07 PROCEDURE — 99213 OFFICE O/P EST LOW 20 MIN: CPT | Performed by: FAMILY MEDICINE

## 2019-01-07 RX ORDER — ALBUTEROL SULFATE 2.5 MG/3ML
2.5 SOLUTION RESPIRATORY (INHALATION) EVERY 4 HOURS PRN
Qty: 75 ML | Refills: 3 | Status: SHIPPED | OUTPATIENT
Start: 2019-01-07 | End: 2019-06-27 | Stop reason: ALTCHOICE

## 2019-01-07 RX ORDER — PREDNISONE 20 MG/1
TABLET ORAL
Qty: 30 TABLET | Refills: 0 | Status: SHIPPED | OUTPATIENT
Start: 2019-01-07 | End: 2019-06-27 | Stop reason: ALTCHOICE

## 2019-01-07 RX ORDER — LEVOFLOXACIN 500 MG/1
500 TABLET, FILM COATED ORAL DAILY
Qty: 10 TABLET | Refills: 0 | Status: SHIPPED | OUTPATIENT
Start: 2019-01-07 | End: 2019-01-17

## 2019-01-07 NOTE — PROGRESS NOTES
Patient presents with:  Chest Congestion: saw dr. Sherry Noyola on Friday. . pt reporsts still not feeling better. . bad cough and chest congestion. .not sleeping due to cough. . room 5      HPI:   Jamir Seo is a 48year old male who presents for upper re Rfl:       Past Medical History:   Diagnosis Date   • BPH (benign prostatic hyperplasia)    • Diverticulitis    • Essential hypertension    • High blood pressure    • HTN (hypertension)    • Osteoarthritis     knees   • Visual impairment     prescription PLAN:     Cough  (primary encounter diagnosis)  Acute rhinosinusitis    Problem List Items Addressed This Visit     None      Visit Diagnoses     Cough    -  Primary    Relevant Medications    albuterol sulfate (2.5 MG/3ML) 0.083% Inhalation Nebu Soln    p

## 2019-01-15 RX ORDER — ZOLPIDEM TARTRATE 10 MG/1
TABLET ORAL
Qty: 30 TABLET | Refills: 1 | Status: SHIPPED
Start: 2019-01-15 | End: 2019-03-25

## 2019-01-16 ENCOUNTER — OFFICE VISIT (OUTPATIENT)
Dept: SURGERY | Facility: CLINIC | Age: 54
End: 2019-01-16

## 2019-01-16 VITALS
HEIGHT: 73 IN | WEIGHT: 225 LBS | DIASTOLIC BLOOD PRESSURE: 78 MMHG | BODY MASS INDEX: 29.82 KG/M2 | SYSTOLIC BLOOD PRESSURE: 131 MMHG | TEMPERATURE: 98 F | HEART RATE: 64 BPM

## 2019-01-16 DIAGNOSIS — K57.92 DIVERTICULITIS: Primary | ICD-10-CM

## 2019-01-16 DIAGNOSIS — T81.43XA INTRA-ABDOMINAL ABSCESS POST-PROCEDURE: ICD-10-CM

## 2019-01-16 PROCEDURE — 99024 POSTOP FOLLOW-UP VISIT: CPT | Performed by: SURGERY

## 2019-01-16 NOTE — PROGRESS NOTES
Post Operative Visit Note       Active Problems  1. Diverticulitis    2. Intra-abdominal abscess post-procedure         Chief Complaint   Patient presents with:  Colon Problem: 1 MONTH CONTINUED CARE AND TREATMENT - COLON RESECTION.  states feels good curre REPLACEMENT SURGERY      Aug 2018,Left knee   • KNEE TOTAL REPLACEMENT Left 8/21/2018    Performed by Jayson Farrell MD at [address] [place] [address] Dr   • [address] An[address] Brush - LEFT N/A 11/15/2018    Performed by Lucy Díaz MD at [facility] MAIN OR   • OTHER MATUTE Potassium 50 MG Oral Tab, Take 50 mg by mouth daily. , Disp: , Rfl:   •  Probiotic Product (PROBIOTIC DAILY OR), Take 1 capsule by mouth daily. , Disp: , Rfl:   •  Multiple Vitamin (MULTIVITAMIN TAB/CAP), Take 1 tablet by mouth daily. , Disp: , Rfl:   •  ta rebound, no guarding, no CVA tenderness, no tenderness at McBurney's point and negative Sandhu's sign. No hernia. Hernia confirmed negative in the ventral area. Lower midline scar well-healed.    Neurological: He is alert and oriented to person, place

## 2019-01-24 ENCOUNTER — TELEPHONE (OUTPATIENT)
Dept: FAMILY MEDICINE CLINIC | Facility: CLINIC | Age: 54
End: 2019-01-24

## 2019-01-24 NOTE — TELEPHONE ENCOUNTER
Spoke with Fabián , pharmacist at [facility] who states 1/15/2019 script was never received.  V/O provided to [facility]

## 2019-03-18 ENCOUNTER — MED REC SCAN ONLY (OUTPATIENT)
Dept: FAMILY MEDICINE CLINIC | Facility: CLINIC | Age: 54
End: 2019-03-18

## 2019-03-21 ENCOUNTER — TELEPHONE (OUTPATIENT)
Dept: FAMILY MEDICINE CLINIC | Facility: CLINIC | Age: 54
End: 2019-03-21

## 2019-03-21 NOTE — TELEPHONE ENCOUNTER
Per epic; pt is O negative and advised of such.  Patient notified and verbalized understanding of the information provided    Collected:  7/31/2018 11:25 AM Status:  Final result Dx:  Degenerative joint disease of knee, left   Component 7/31/18 11:25 AM   A

## 2019-03-25 RX ORDER — ZOLPIDEM TARTRATE 10 MG/1
TABLET ORAL
Qty: 30 TABLET | Refills: 0 | Status: SHIPPED
Start: 2019-03-25 | End: 2019-04-17

## 2019-04-17 RX ORDER — ZOLPIDEM TARTRATE 10 MG/1
TABLET ORAL
Qty: 30 TABLET | Refills: 0 | Status: SHIPPED
Start: 2019-04-17 | End: 2019-05-20

## 2019-04-17 NOTE — TELEPHONE ENCOUNTER
Last office visit with PCP:  1/07/2019  Last refill: 3/25/2019   Requested Prescriptions     Pending Prescriptions Disp Refills   • ZOLPIDEM TARTRATE 10 MG Oral Tab [Pharmacy Med Name: ZOLPIDEM 10MG TABLETS] 30 tablet 0     Sig: TAKE 1 TABLET BY MOUTH EVER

## 2019-04-21 DIAGNOSIS — I10 ESSENTIAL HYPERTENSION: ICD-10-CM

## 2019-04-22 RX ORDER — LOSARTAN POTASSIUM 50 MG/1
TABLET ORAL
Qty: 30 TABLET | Refills: 2 | Status: SHIPPED | OUTPATIENT
Start: 2019-04-22 | End: 2019-07-22

## 2019-05-20 RX ORDER — ZOLPIDEM TARTRATE 10 MG/1
TABLET ORAL
Qty: 30 TABLET | Refills: 0 | Status: SHIPPED
Start: 2019-05-20 | End: 2019-06-27

## 2019-06-27 ENCOUNTER — OFFICE VISIT (OUTPATIENT)
Dept: FAMILY MEDICINE CLINIC | Facility: CLINIC | Age: 54
End: 2019-06-27
Payer: COMMERCIAL

## 2019-06-27 VITALS
TEMPERATURE: 97 F | HEART RATE: 112 BPM | BODY MASS INDEX: 32.42 KG/M2 | HEIGHT: 72.25 IN | WEIGHT: 242 LBS | SYSTOLIC BLOOD PRESSURE: 110 MMHG | DIASTOLIC BLOOD PRESSURE: 60 MMHG | RESPIRATION RATE: 12 BRPM

## 2019-06-27 DIAGNOSIS — G47.26 SHIFT WORK SLEEP DISORDER: Primary | ICD-10-CM

## 2019-06-27 DIAGNOSIS — Z00.00 WELL ADULT EXAM: ICD-10-CM

## 2019-06-27 DIAGNOSIS — Z96.651 S/P TOTAL KNEE ARTHROPLASTY, RIGHT: ICD-10-CM

## 2019-06-27 DIAGNOSIS — Z00.00 LABORATORY EXAMINATION ORDERED AS PART OF A ROUTINE GENERAL MEDICAL EXAMINATION: ICD-10-CM

## 2019-06-27 DIAGNOSIS — Z13.1 SCREENING FOR DIABETES MELLITUS: ICD-10-CM

## 2019-06-27 DIAGNOSIS — I10 ESSENTIAL HYPERTENSION: ICD-10-CM

## 2019-06-27 DIAGNOSIS — Z13.0 SCREENING, ANEMIA, DEFICIENCY, IRON: ICD-10-CM

## 2019-06-27 DIAGNOSIS — K57.90 DIVERTICULOSIS OF INTESTINE WITHOUT BLEEDING, UNSPECIFIED INTESTINAL TRACT LOCATION: ICD-10-CM

## 2019-06-27 DIAGNOSIS — Z13.220 SCREENING, LIPID: ICD-10-CM

## 2019-06-27 PROCEDURE — 99396 PREV VISIT EST AGE 40-64: CPT | Performed by: FAMILY MEDICINE

## 2019-06-27 RX ORDER — ZOLPIDEM TARTRATE 10 MG/1
10 TABLET ORAL NIGHTLY
Qty: 30 TABLET | Refills: 2 | Status: SHIPPED | OUTPATIENT
Start: 2019-06-27 | End: 2019-09-23

## 2019-06-27 NOTE — PROGRESS NOTES
Natalie Vicente is a 48year old male.     CC:  Patient presents with:  CPX: .inrm 5    Chief Complaint Reviewed and Verified  Nursing Notes Reviewed and   Verified  Tobacco Reviewed  Allergies Reviewed  Medications Reviewed    Problem List Reviewed Disp:  Rfl:    tamsulosin HCl 0.4 MG Oral Cap Take 0.4 mg by mouth daily. Disp:  Rfl:      No current facility-administered medications on file prior to visit.       History:  Past Medical History:   Diagnosis Date   • BPH (benign prostatic hyperplasia) 110/60  01/16/19 : 131/78  01/07/19 : 130/86    REVIEW OF SYSTEMS:   GENERAL: feels well otherwise  SKIN: denies any unusual skin lesions  EYES:denies blurred vision or double vision  HEENT: denies nasal congestion, sinus pain or ST  LUNGS: denies shortnes abscess.   Doing well at this time stable           Other Visit Diagnoses     Well adult exam        Laboratory examination ordered as part of a routine general medical examination        Relevant Orders    CBC WITH DIFFERENTIAL WITH PLATELET    COMP METABO

## 2019-06-28 PROBLEM — K57.92 DIVERTICULITIS: Status: RESOLVED | Noted: 2018-11-15 | Resolved: 2019-06-28

## 2019-06-28 PROBLEM — D64.9 ANEMIA: Status: RESOLVED | Noted: 2018-12-09 | Resolved: 2019-06-28

## 2019-07-02 ENCOUNTER — LABORATORY ENCOUNTER (OUTPATIENT)
Dept: LAB | Age: 54
End: 2019-07-02
Attending: FAMILY MEDICINE
Payer: COMMERCIAL

## 2019-07-02 DIAGNOSIS — I10 ESSENTIAL HYPERTENSION: ICD-10-CM

## 2019-07-02 DIAGNOSIS — Z13.0 SCREENING, ANEMIA, DEFICIENCY, IRON: ICD-10-CM

## 2019-07-02 DIAGNOSIS — Z13.1 SCREENING FOR DIABETES MELLITUS: ICD-10-CM

## 2019-07-02 DIAGNOSIS — Z13.220 SCREENING, LIPID: ICD-10-CM

## 2019-07-02 DIAGNOSIS — E78.00 ELEVATED LDL CHOLESTEROL LEVEL: Primary | ICD-10-CM

## 2019-07-02 DIAGNOSIS — Z00.00 LABORATORY EXAMINATION ORDERED AS PART OF A ROUTINE GENERAL MEDICAL EXAMINATION: ICD-10-CM

## 2019-07-02 LAB
ALBUMIN SERPL-MCNC: 3.8 G/DL (ref 3.4–5)
ALBUMIN/GLOB SERPL: 1.2 {RATIO} (ref 1–2)
ALP LIVER SERPL-CCNC: 56 U/L (ref 45–117)
ALT SERPL-CCNC: 20 U/L (ref 16–61)
ANION GAP SERPL CALC-SCNC: 6 MMOL/L (ref 0–18)
AST SERPL-CCNC: 20 U/L (ref 15–37)
BASOPHILS # BLD AUTO: 0.03 X10(3) UL (ref 0–0.2)
BASOPHILS NFR BLD AUTO: 0.7 %
BILIRUB SERPL-MCNC: 0.6 MG/DL (ref 0.1–2)
BUN BLD-MCNC: 11 MG/DL (ref 7–18)
BUN/CREAT SERPL: 12.5 (ref 10–20)
CALCIUM BLD-MCNC: 9.2 MG/DL (ref 8.5–10.1)
CHLORIDE SERPL-SCNC: 107 MMOL/L (ref 98–112)
CHOLEST SMN-MCNC: 184 MG/DL (ref ?–200)
CO2 SERPL-SCNC: 28 MMOL/L (ref 21–32)
CREAT BLD-MCNC: 0.88 MG/DL (ref 0.7–1.3)
DEPRECATED RDW RBC AUTO: 37.7 FL (ref 35.1–46.3)
EOSINOPHIL # BLD AUTO: 0.28 X10(3) UL (ref 0–0.7)
EOSINOPHIL NFR BLD AUTO: 6.1 %
ERYTHROCYTE [DISTWIDTH] IN BLOOD BY AUTOMATED COUNT: 12.5 % (ref 11–15)
GLOBULIN PLAS-MCNC: 3.3 G/DL (ref 2.8–4.4)
GLUCOSE BLD-MCNC: 97 MG/DL (ref 70–99)
HCT VFR BLD AUTO: 39.8 % (ref 39–53)
HDLC SERPL-MCNC: 57 MG/DL (ref 40–59)
HGB BLD-MCNC: 13.4 G/DL (ref 13–17.5)
IMM GRANULOCYTES # BLD AUTO: 0.01 X10(3) UL (ref 0–1)
IMM GRANULOCYTES NFR BLD: 0.2 %
LDLC SERPL CALC-MCNC: 102 MG/DL (ref ?–100)
LYMPHOCYTES # BLD AUTO: 1.46 X10(3) UL (ref 1–4)
LYMPHOCYTES NFR BLD AUTO: 31.7 %
M PROTEIN MFR SERPL ELPH: 7.1 G/DL (ref 6.4–8.2)
MCH RBC QN AUTO: 28.3 PG (ref 26–34)
MCHC RBC AUTO-ENTMCNC: 33.7 G/DL (ref 31–37)
MCV RBC AUTO: 84 FL (ref 80–100)
MONOCYTES # BLD AUTO: 0.49 X10(3) UL (ref 0.1–1)
MONOCYTES NFR BLD AUTO: 10.7 %
NEUTROPHILS # BLD AUTO: 2.33 X10 (3) UL (ref 1.5–7.7)
NEUTROPHILS # BLD AUTO: 2.33 X10(3) UL (ref 1.5–7.7)
NEUTROPHILS NFR BLD AUTO: 50.6 %
NONHDLC SERPL-MCNC: 127 MG/DL (ref ?–130)
OSMOLALITY SERPL CALC.SUM OF ELEC: 291 MOSM/KG (ref 275–295)
PLATELET # BLD AUTO: 169 10(3)UL (ref 150–450)
POTASSIUM SERPL-SCNC: 3.8 MMOL/L (ref 3.5–5.1)
RBC # BLD AUTO: 4.74 X10(6)UL (ref 4.3–5.7)
SODIUM SERPL-SCNC: 141 MMOL/L (ref 136–145)
TRIGL SERPL-MCNC: 125 MG/DL (ref 30–149)
VLDLC SERPL CALC-MCNC: 25 MG/DL (ref 0–30)
WBC # BLD AUTO: 4.6 X10(3) UL (ref 4–11)

## 2019-07-02 PROCEDURE — 85025 COMPLETE CBC W/AUTO DIFF WBC: CPT

## 2019-07-02 PROCEDURE — 80061 LIPID PANEL: CPT

## 2019-07-02 PROCEDURE — 80053 COMPREHEN METABOLIC PANEL: CPT

## 2019-07-02 PROCEDURE — 36415 COLL VENOUS BLD VENIPUNCTURE: CPT

## 2019-07-22 DIAGNOSIS — I10 ESSENTIAL HYPERTENSION: ICD-10-CM

## 2019-07-22 RX ORDER — LOSARTAN POTASSIUM 50 MG/1
TABLET ORAL
Qty: 30 TABLET | Refills: 3 | Status: SHIPPED | OUTPATIENT
Start: 2019-07-22 | End: 2019-11-20

## 2019-09-23 DIAGNOSIS — G47.26 SHIFT WORK SLEEP DISORDER: ICD-10-CM

## 2019-09-23 RX ORDER — ZOLPIDEM TARTRATE 10 MG/1
TABLET ORAL
Qty: 30 TABLET | Refills: 0 | Status: SHIPPED | OUTPATIENT
Start: 2019-09-23 | End: 2019-11-11

## 2019-09-26 ENCOUNTER — MED REC SCAN ONLY (OUTPATIENT)
Dept: FAMILY MEDICINE CLINIC | Facility: CLINIC | Age: 54
End: 2019-09-26

## 2019-11-11 DIAGNOSIS — G47.26 SHIFT WORK SLEEP DISORDER: ICD-10-CM

## 2019-11-11 RX ORDER — ZOLPIDEM TARTRATE 10 MG/1
TABLET ORAL
Qty: 30 TABLET | Refills: 0 | Status: SHIPPED | OUTPATIENT
Start: 2019-11-11 | End: 2019-12-23

## 2019-11-20 DIAGNOSIS — I10 ESSENTIAL HYPERTENSION: ICD-10-CM

## 2019-11-20 RX ORDER — LOSARTAN POTASSIUM 50 MG/1
TABLET ORAL
Qty: 30 TABLET | Refills: 0 | Status: SHIPPED | OUTPATIENT
Start: 2019-11-20 | End: 2019-12-20

## 2019-12-16 ENCOUNTER — OFFICE VISIT (OUTPATIENT)
Dept: FAMILY MEDICINE CLINIC | Facility: CLINIC | Age: 54
End: 2019-12-16
Payer: COMMERCIAL

## 2019-12-16 VITALS
WEIGHT: 224 LBS | DIASTOLIC BLOOD PRESSURE: 80 MMHG | BODY MASS INDEX: 30.01 KG/M2 | HEART RATE: 88 BPM | RESPIRATION RATE: 12 BRPM | SYSTOLIC BLOOD PRESSURE: 110 MMHG | TEMPERATURE: 97 F | HEIGHT: 72.25 IN

## 2019-12-16 DIAGNOSIS — K57.90 DIVERTICULOSIS OF INTESTINE WITHOUT BLEEDING, UNSPECIFIED INTESTINAL TRACT LOCATION: Primary | ICD-10-CM

## 2019-12-16 PROBLEM — N48.6 PEYRONIE DISEASE: Status: ACTIVE | Noted: 2018-02-12

## 2019-12-16 PROCEDURE — 99213 OFFICE O/P EST LOW 20 MIN: CPT | Performed by: FAMILY MEDICINE

## 2019-12-16 RX ORDER — LEVOFLOXACIN 750 MG/1
750 TABLET ORAL DAILY
Qty: 10 TABLET | Refills: 0 | Status: SHIPPED | OUTPATIENT
Start: 2019-12-16 | End: 2020-02-19

## 2019-12-16 NOTE — PROGRESS NOTES
Mook Craig is a 48year old male. Patient presents with:  Abdominal Pain: inrm .  5      Chief Complaint Reviewed and Verified  Nursing Notes Reviewed and   Verified  Tobacco Reviewed  Allergies Reviewed  Medications Reviewed    Problem List Jaja Smoker        Years: 30.00        Quit date: 2015        Years since quittin.8      Smokeless tobacco: Never Used    Alcohol use:  Yes      Alcohol/week: 5.0 standard drinks      Types: 5 Cans of beer per week      Comment: \"weekends\"    Drug use to improve. Meds & Refills for this Visit:  Requested Prescriptions     Signed Prescriptions Disp Refills   • levofloxacin 750 MG Oral Tab 10 tablet 0     Sig: Take 1 tablet (750 mg total) by mouth daily for 10 days.            Claudeen Brodie, M.D

## 2019-12-20 ENCOUNTER — TELEPHONE (OUTPATIENT)
Dept: FAMILY MEDICINE CLINIC | Facility: CLINIC | Age: 54
End: 2019-12-20

## 2019-12-20 DIAGNOSIS — I10 ESSENTIAL HYPERTENSION: ICD-10-CM

## 2019-12-20 RX ORDER — LOSARTAN POTASSIUM 50 MG/1
TABLET ORAL
Qty: 30 TABLET | Refills: 0 | Status: SHIPPED | OUTPATIENT
Start: 2019-12-20 | End: 2020-01-20

## 2019-12-23 DIAGNOSIS — G47.26 SHIFT WORK SLEEP DISORDER: ICD-10-CM

## 2019-12-23 RX ORDER — ZOLPIDEM TARTRATE 10 MG/1
TABLET ORAL
Qty: 30 TABLET | Refills: 0 | Status: SHIPPED | OUTPATIENT
Start: 2019-12-23 | End: 2020-01-23

## 2020-01-20 ENCOUNTER — TELEPHONE (OUTPATIENT)
Dept: FAMILY MEDICINE CLINIC | Facility: CLINIC | Age: 55
End: 2020-01-20

## 2020-01-20 DIAGNOSIS — I10 ESSENTIAL HYPERTENSION: ICD-10-CM

## 2020-01-20 RX ORDER — LOSARTAN POTASSIUM 50 MG/1
TABLET ORAL
Qty: 90 TABLET | Refills: 0 | Status: SHIPPED | OUTPATIENT
Start: 2020-01-20 | End: 2020-04-19

## 2020-01-23 DIAGNOSIS — G47.26 SHIFT WORK SLEEP DISORDER: ICD-10-CM

## 2020-01-23 RX ORDER — ZOLPIDEM TARTRATE 10 MG/1
TABLET ORAL
Qty: 30 TABLET | Refills: 0 | Status: SHIPPED | OUTPATIENT
Start: 2020-01-23 | End: 2020-02-27

## 2020-01-23 NOTE — TELEPHONE ENCOUNTER
Last OV: 12/16/2019  Last labs: 7/2/2019  Last filled: 12/23/2019 #30 no RF    No future appointments.

## 2020-02-19 ENCOUNTER — OFFICE VISIT (OUTPATIENT)
Dept: FAMILY MEDICINE CLINIC | Facility: CLINIC | Age: 55
End: 2020-02-19
Payer: COMMERCIAL

## 2020-02-19 VITALS
HEART RATE: 60 BPM | SYSTOLIC BLOOD PRESSURE: 118 MMHG | RESPIRATION RATE: 12 BRPM | DIASTOLIC BLOOD PRESSURE: 80 MMHG | HEIGHT: 72.25 IN | WEIGHT: 249.25 LBS | TEMPERATURE: 97 F | BODY MASS INDEX: 33.39 KG/M2

## 2020-02-19 DIAGNOSIS — K57.90 DIVERTICULOSIS OF INTESTINE WITHOUT BLEEDING, UNSPECIFIED INTESTINAL TRACT LOCATION: ICD-10-CM

## 2020-02-19 PROCEDURE — 99213 OFFICE O/P EST LOW 20 MIN: CPT | Performed by: FAMILY MEDICINE

## 2020-02-19 RX ORDER — LEVOFLOXACIN 750 MG/1
750 TABLET ORAL DAILY
Qty: 10 TABLET | Refills: 0 | Status: SHIPPED | OUTPATIENT
Start: 2020-02-19 | End: 2020-02-29

## 2020-02-19 NOTE — PROGRESS NOTES
Maye Lentz is a 50year old male. Patient presents with:  Diverticulitis: inrm.  5      Chief Complaint Reviewed and Verified  Nursing Notes Reviewed and   Verified  Tobacco Reviewed  Allergies Reviewed  Medications Reviewed    Problem List Review Encounters:  02/19/20 : 118/80  12/16/19 : 110/80  06/27/19 : 110/60  01/16/19 : 131/78  01/07/19 : 130/86  01/04/19 : 120/70      Wt Readings from Last 6 Encounters:  02/19/20 : 249 lb 4 oz (113.1 kg)  12/16/19 : 224 lb (101.6 kg)  06/27/19 : 242 lb (109.

## 2020-02-26 DIAGNOSIS — G47.26 SHIFT WORK SLEEP DISORDER: ICD-10-CM

## 2020-02-27 RX ORDER — ZOLPIDEM TARTRATE 10 MG/1
TABLET ORAL
Qty: 30 TABLET | Refills: 0 | Status: SHIPPED | OUTPATIENT
Start: 2020-02-27 | End: 2020-04-13

## 2020-04-10 ENCOUNTER — TELEPHONE (OUTPATIENT)
Dept: FAMILY MEDICINE CLINIC | Facility: CLINIC | Age: 55
End: 2020-04-10

## 2020-04-10 DIAGNOSIS — J31.0 RHINITIS MEDICAMENTOSA: ICD-10-CM

## 2020-04-10 DIAGNOSIS — J01.40 ACUTE NON-RECURRENT PANSINUSITIS: Primary | ICD-10-CM

## 2020-04-10 DIAGNOSIS — J20.9 BRONCHITIS WITH BRONCHOSPASM: ICD-10-CM

## 2020-04-10 DIAGNOSIS — T48.5X5A RHINITIS MEDICAMENTOSA: ICD-10-CM

## 2020-04-10 PROCEDURE — 99213 OFFICE O/P EST LOW 20 MIN: CPT | Performed by: FAMILY MEDICINE

## 2020-04-10 RX ORDER — AZITHROMYCIN 250 MG/1
TABLET, FILM COATED ORAL
Qty: 6 TABLET | Refills: 0 | Status: SHIPPED | OUTPATIENT
Start: 2020-04-10 | End: 2020-04-13

## 2020-04-10 RX ORDER — ALBUTEROL SULFATE 2.5 MG/3ML
2.5 SOLUTION RESPIRATORY (INHALATION) EVERY 4 HOURS PRN
Qty: 25 VIAL | Refills: 0 | Status: SHIPPED | OUTPATIENT
Start: 2020-04-10 | End: 2020-04-20

## 2020-04-10 NOTE — TELEPHONE ENCOUNTER
Virtual Telephone Check-In    Kae Saumya verbally consents to a Virtual/Telephone Check-In visit on 04/10/20. Patient understands and accepts financial responsibility for any deductible, co-insurance and/or co-pays associated with this service. (ZITHROMAX Z-EVERTON) 250 MG Oral Tab; Take 2 tablets (500 mg total) by mouth daily for 1 day, THEN 1 tablet (250 mg total) daily for 4 days. -     albuterol sulfate (2.5 MG/3ML) 0.083% Inhalation Nebu Soln;  Take 3 mL (2.5 mg total) by nebulization every 4 (f

## 2020-04-10 NOTE — TELEPHONE ENCOUNTER
Pt has cough, little bit of a wheeze, stuffy nose, no fever, ok for ph or televisit with charge if needed.

## 2020-04-13 ENCOUNTER — TELEPHONE (OUTPATIENT)
Dept: FAMILY MEDICINE CLINIC | Facility: CLINIC | Age: 55
End: 2020-04-13

## 2020-04-13 DIAGNOSIS — G47.26 SHIFT WORK SLEEP DISORDER: ICD-10-CM

## 2020-04-13 DIAGNOSIS — J01.90 ACUTE RHINOSINUSITIS: ICD-10-CM

## 2020-04-13 RX ORDER — LEVOFLOXACIN 750 MG/1
750 TABLET ORAL DAILY
Qty: 10 TABLET | Refills: 0 | Status: SHIPPED | OUTPATIENT
Start: 2020-04-13 | End: 2020-04-23

## 2020-04-13 RX ORDER — ZOLPIDEM TARTRATE 10 MG/1
TABLET ORAL
Qty: 30 TABLET | Refills: 0 | Status: SHIPPED | OUTPATIENT
Start: 2020-04-13 | End: 2020-05-21

## 2020-04-13 RX ORDER — PREDNISONE 20 MG/1
TABLET ORAL
Qty: 30 TABLET | Refills: 0 | Status: SHIPPED | OUTPATIENT
Start: 2020-04-13 | End: 2020-10-08 | Stop reason: ALTCHOICE

## 2020-04-13 NOTE — TELEPHONE ENCOUNTER
Pt had a e-visit on Friday and the medicine prescribed he is allergic to, needs new medicine. Can he get a steroid?

## 2020-04-13 NOTE — TELEPHONE ENCOUNTER
Spoke with wife Wayne Zaragoza who states patient was prescribed Azithromycin on 4/10/2020 by Dr. Kacie Oh. She states when patient went to pick it up, the pharmacist said it was in the same category as Erythromycin, which patient is allergic to.  He gets bad diarrhea

## 2020-04-13 NOTE — TELEPHONE ENCOUNTER
I sent Levaquin, as well as the steroid. Stop the azithromycin. He should have no allergies to these medicines. Sent to [facility].

## 2020-04-18 DIAGNOSIS — I10 ESSENTIAL HYPERTENSION: ICD-10-CM

## 2020-04-19 RX ORDER — LOSARTAN POTASSIUM 50 MG/1
TABLET ORAL
Qty: 90 TABLET | Refills: 0 | Status: SHIPPED | OUTPATIENT
Start: 2020-04-19 | End: 2020-07-21

## 2020-04-20 ENCOUNTER — TELEPHONE (OUTPATIENT)
Dept: FAMILY MEDICINE CLINIC | Facility: CLINIC | Age: 55
End: 2020-04-20

## 2020-04-20 DIAGNOSIS — J20.9 BRONCHITIS WITH BRONCHOSPASM: ICD-10-CM

## 2020-04-20 DIAGNOSIS — Z71.89 ADVICE GIVEN ABOUT COVID-19 VIRUS BY TELEPHONE: ICD-10-CM

## 2020-04-20 DIAGNOSIS — J01.90 ACUTE RHINOSINUSITIS: Primary | ICD-10-CM

## 2020-04-20 PROCEDURE — 99441 PHONE E/M BY PHYS 5-10 MIN: CPT | Performed by: FAMILY MEDICINE

## 2020-04-20 RX ORDER — ALBUTEROL SULFATE 2.5 MG/3ML
2.5 SOLUTION RESPIRATORY (INHALATION) EVERY 4 HOURS PRN
Qty: 25 VIAL | Refills: 0 | Status: SHIPPED | OUTPATIENT
Start: 2020-04-20 | End: 2020-12-02 | Stop reason: ALTCHOICE

## 2020-04-20 NOTE — TELEPHONE ENCOUNTER
Spoke with Dr. Rivka Rowley, he will call the patient and do a telemed visit. Received a confirmation from [facility] stating the patient was confirmed negative. There was an error on lab part. Please see other note for more details.

## 2020-04-20 NOTE — TELEPHONE ENCOUNTER
Spoke with patient who states he was in the ER on Friday night for SOB. They tested him at the ER for [address], did a rapid, and told him it was negative.  They diagnosed him with Bronchospasms, and also told him to continue taking his Prednisone 3 tabs lucero

## 2020-04-20 NOTE — TELEPHONE ENCOUNTER
The for step is to call the county. Did he say [facility]? Because the hospital was in [facility]?   Unless they send it to his county of residence-which is [facility]    I think the first thing we have to do is identify ourself to the [place]

## 2020-04-20 NOTE — TELEPHONE ENCOUNTER
Virtual/Telephone Check-In    Lachelle Shepard  verbally consents to a Virtual/Telephone Check-In service on 4/20/2020 .   Patient understands and accepts financial responsibility for any deductible, co-insurance and/or co-pays associated with this servi with breathing and cough shortness of breath but it is not severe. He will be extending his prednisone as the ER gave him an extra 12 tablets to give him 60 mg extended 4 more days. He will then complete the paper that we are he we originally had him on. Laterality Date   • COLECTOMY     • COLONOSCOPY  04/03/2017   • KNEE REPLACEMENT SURGERY      Aug 2018,Left knee   • KNEE TOTAL REPLACEMENT Left 8/21/2018    Performed by River Worrell MD at [address] [place] [address] - LEFT N/A 11/15/20 appropriate for the phone consultation.     ASSESSMENT AND PLAN:     Acute rhinosinusitis  (primary encounter diagnosis)  Bronchitis with bronchospasm  Advice given about covid-19 virus by telephone--negative testing for this patient    Problem List Items A

## 2020-04-20 NOTE — TELEPHONE ENCOUNTER
WILL FAX OVER COPY OF COVID19 RESULTS, PT IS IN FACT NEGATIVE, THERE WAS AN ERROR MADE BY LAB PERSON, ALL TESTS WERE PUSHED OUT TO HEALTH DEPT, BUT ONLY POSITIVES ARE SUPPOSED TO BE PUSHED OUT, NOT NEGATIVES

## 2020-04-20 NOTE — TELEPHONE ENCOUNTER
WAS SEEN @ [facility] ER FRIDAY NIGHT FOR BREATHING ISSUES, GOT TESTED FOR COVID19 & WAS TOLD BY [facility]-ER TEST WAS NEGATIVE, BUT THEN HE GOT A CALL FROM [address] [place] [address] SAYING HE TESTED POSITIVE, DID WE GET THESE RESULTS?  PT CONFUSED WHY HE GOT 2 DIFFERENT R

## 2020-04-21 ENCOUNTER — MED REC SCAN ONLY (OUTPATIENT)
Dept: FAMILY MEDICINE CLINIC | Facility: CLINIC | Age: 55
End: 2020-04-21

## 2020-05-11 ENCOUNTER — MED REC SCAN ONLY (OUTPATIENT)
Dept: FAMILY MEDICINE CLINIC | Facility: CLINIC | Age: 55
End: 2020-05-11

## 2020-05-21 DIAGNOSIS — G47.26 SHIFT WORK SLEEP DISORDER: ICD-10-CM

## 2020-05-21 RX ORDER — ZOLPIDEM TARTRATE 10 MG/1
TABLET ORAL
Qty: 30 TABLET | Refills: 0 | Status: SHIPPED | OUTPATIENT
Start: 2020-05-21 | End: 2020-06-09

## 2020-06-08 DIAGNOSIS — G47.26 SHIFT WORK SLEEP DISORDER: ICD-10-CM

## 2020-06-09 RX ORDER — ZOLPIDEM TARTRATE 10 MG/1
TABLET ORAL
Qty: 30 TABLET | Refills: 0 | Status: SHIPPED | OUTPATIENT
Start: 2020-06-09 | End: 2020-06-10 | Stop reason: RX

## 2020-06-09 NOTE — TELEPHONE ENCOUNTER
LOV:  2/19/2020     LAB:    7/2/2019     LRX:    30 tablet 0 refill 5/21/2020    NOV:     No future appointments.     PROTOCOL:    none

## 2020-06-10 ENCOUNTER — TELEPHONE (OUTPATIENT)
Dept: FAMILY MEDICINE CLINIC | Facility: CLINIC | Age: 55
End: 2020-06-10

## 2020-06-10 DIAGNOSIS — G47.26 SHIFT WORK SLEEP DISORDER: ICD-10-CM

## 2020-06-10 RX ORDER — ZOLPIDEM TARTRATE 5 MG/1
10 TABLET ORAL NIGHTLY
Qty: 60 TABLET | Refills: 0 | Status: SHIPPED | OUTPATIENT
Start: 2020-06-10 | End: 2020-10-08

## 2020-07-08 NOTE — TELEPHONE ENCOUNTER
Received a fax from [employer] stating a prior Celso Maw is needed for Zolpidem 5 mg tablets. Prior authorization initiated.

## 2020-07-13 RX ORDER — ZOLPIDEM TARTRATE 10 MG/1
10 TABLET ORAL NIGHTLY
Qty: 30 TABLET | Refills: 0 | Status: SHIPPED | OUTPATIENT
Start: 2020-07-13 | End: 2020-10-27

## 2020-07-13 NOTE — TELEPHONE ENCOUNTER
Prior authorization denied for 5 mg tablets due to quantity. Script resent for original order, 10 mg tablets,  to see if that is covered by insurance.

## 2020-07-17 ENCOUNTER — MED REC SCAN ONLY (OUTPATIENT)
Dept: FAMILY MEDICINE CLINIC | Facility: CLINIC | Age: 55
End: 2020-07-17

## 2020-07-17 DIAGNOSIS — I10 ESSENTIAL HYPERTENSION: ICD-10-CM

## 2020-07-20 NOTE — TELEPHONE ENCOUNTER
Hypertension Medications Protocol Failed7/17 6:46 AM   CMP or BMP in past 12 months    Last serum creatinine< 2.0    Appointment in past 6 or next 3 months     Last Ov w/Dr Pizano 2/19/20  Last CMP 7/2/19  Last refill 4/19/20  No future appointments.

## 2020-07-21 RX ORDER — LOSARTAN POTASSIUM 50 MG/1
TABLET ORAL
Qty: 90 TABLET | Refills: 0 | Status: SHIPPED | OUTPATIENT
Start: 2020-07-21 | End: 2020-10-15

## 2020-07-31 ENCOUNTER — TELEPHONE (OUTPATIENT)
Dept: FAMILY MEDICINE CLINIC | Facility: CLINIC | Age: 55
End: 2020-07-31

## 2020-08-04 NOTE — TELEPHONE ENCOUNTER
Pt. Called back. Pt. Did get the zolpidem. Pt. Said he got 5 mg. Tabs to take twice daily. Pt. Said if any other questions to call him back. Pt. Is asking who contacted our office re: this med.

## 2020-09-15 ENCOUNTER — MED REC SCAN ONLY (OUTPATIENT)
Dept: FAMILY MEDICINE CLINIC | Facility: CLINIC | Age: 55
End: 2020-09-15

## 2020-10-07 ENCOUNTER — TELEPHONE (OUTPATIENT)
Dept: FAMILY MEDICINE CLINIC | Facility: CLINIC | Age: 55
End: 2020-10-07

## 2020-10-07 NOTE — TELEPHONE ENCOUNTER
Quentin Haque verbally consents to a Virtual/Telephone Check-In service on 10/8/2020  Patient understands and accepts financial responsibility for any deductible, co-insurance and/or co-pays associated with this service. Quentin Haque verbally c

## 2020-10-08 ENCOUNTER — OFFICE VISIT (OUTPATIENT)
Dept: FAMILY MEDICINE CLINIC | Facility: CLINIC | Age: 55
End: 2020-10-08
Payer: COMMERCIAL

## 2020-10-08 VITALS
RESPIRATION RATE: 16 BRPM | WEIGHT: 252.38 LBS | BODY MASS INDEX: 34 KG/M2 | HEART RATE: 88 BPM | SYSTOLIC BLOOD PRESSURE: 132 MMHG | DIASTOLIC BLOOD PRESSURE: 86 MMHG | TEMPERATURE: 99 F

## 2020-10-08 DIAGNOSIS — Z87.19 HISTORY OF DIVERTICULITIS: ICD-10-CM

## 2020-10-08 DIAGNOSIS — R10.31 RIGHT LOWER QUADRANT ABDOMINAL PAIN: ICD-10-CM

## 2020-10-08 DIAGNOSIS — K40.90 NON-RECURRENT UNILATERAL INGUINAL HERNIA WITHOUT OBSTRUCTION OR GANGRENE: Primary | ICD-10-CM

## 2020-10-08 PROBLEM — T81.43XA INTRA-ABDOMINAL ABSCESS POST-PROCEDURE (HCC): Status: RESOLVED | Noted: 2018-12-09 | Resolved: 2020-10-08

## 2020-10-08 PROBLEM — K65.1 INTRA-ABDOMINAL ABSCESS POST-PROCEDURE (HCC): Status: RESOLVED | Noted: 2018-12-09 | Resolved: 2020-10-08

## 2020-10-08 PROBLEM — T81.43XA INTRA-ABDOMINAL ABSCESS POST-PROCEDURE: Status: RESOLVED | Noted: 2018-12-09 | Resolved: 2020-10-08

## 2020-10-08 PROCEDURE — 3079F DIAST BP 80-89 MM HG: CPT | Performed by: FAMILY MEDICINE

## 2020-10-08 PROCEDURE — 99213 OFFICE O/P EST LOW 20 MIN: CPT | Performed by: FAMILY MEDICINE

## 2020-10-08 PROCEDURE — 3075F SYST BP GE 130 - 139MM HG: CPT | Performed by: FAMILY MEDICINE

## 2020-10-08 RX ORDER — LEVOFLOXACIN 500 MG/1
500 TABLET, FILM COATED ORAL DAILY
Qty: 7 TABLET | Refills: 0 | Status: SHIPPED | OUTPATIENT
Start: 2020-10-08 | End: 2020-12-02 | Stop reason: ALTCHOICE

## 2020-10-08 NOTE — PROGRESS NOTES
Quentin Haque is a 50year old male. Telehealth outside of [facility] [address] Verbal Consent   I conducted a telehealth visit with Quentin Haque today, 10/08/20, which was completed using two-way, real-time interactive audio and video communica Nebu Soln Take 3 mL (2.5 mg total) by nebulization every 4 (four) hours as needed for Wheezing. 25 vial 0   • docusate sodium 100 MG Oral Cap Take 1 capsule (100 mg total) by mouth 2 (two) times daily.  30 capsule 0   • Probiotic Product (PROBIOTIC DAILY OR apparent distress, well hydrated  SKIN: no rashes,no suspicious lesions  ENT: TMs: intact, good mobility, Nose: turbinates clear, no dc, Throat: no erythema, pnd, or lesions  NECK: supple,no adenopathy,no bruits, no thyromegaly  LUNGS: clear to auscultatio

## 2020-10-08 NOTE — PROGRESS NOTES
Enrique Mims is a 50year old male. Telehealth outside of [address] Verbal Consent   I conducted a telehealth visit with Enrique Mims today, 10/08/20, which was completed using two-way, real-time interactive audio communication.   The thinks this may be a recurrence but he denies any fevers, chills, sweats, change in appetite etc.  I have asked him to come in to the office for an in person evaluation    HPI:   Patient has past history of diverticulitis with microperforation requiring la COLONOSCOPY  04/03/2017   • KNEE REPLACEMENT SURGERY      Aug 2018,Left knee   • KNEE TOTAL REPLACEMENT Left 8/21/2018    Performed by Roxy Mcdermott MD at [facility]   • [facility] - LEFT N/A 11/15/2018    Performed by Jonh Saldana quadrant abdominal pain  History of diverticulitis  No orders of the defined types were placed in this encounter.     Meds & Refills for this Visit:  Requested Prescriptions     Signed Prescriptions Disp Refills   • levofloxacin (LEVAQUIN) 500 MG Oral Tab 7

## 2020-10-08 NOTE — PATIENT INSTRUCTIONS
I reviewed the presence of the inguinal bulging that is not quite an overt herniation. I discussed signs and symptoms of herniation as well as incarceration.   Would recommend avoiding breath-holding and straining as much as possible for the future  I disc

## 2020-10-15 DIAGNOSIS — I10 ESSENTIAL HYPERTENSION: ICD-10-CM

## 2020-10-15 RX ORDER — LOSARTAN POTASSIUM 50 MG/1
TABLET ORAL
Qty: 90 TABLET | Refills: 0 | Status: SHIPPED | OUTPATIENT
Start: 2020-10-15 | End: 2021-01-13

## 2020-10-15 NOTE — TELEPHONE ENCOUNTER
Left message pt is due for fasting labs     LOV: 10-8-2020    LAST LAB: 7-2-2019  LAST RX:     LOSARTAN POTASSIUM 50 MG Oral Tab 90 tablet 0 refills  7/21/2020    Sig:   TAKE 1 TABLET(50 MG) BY MOUTH          Next OV: No future appointments.        PROTOCOL

## 2020-10-27 DIAGNOSIS — G47.26 SHIFT WORK SLEEP DISORDER: ICD-10-CM

## 2020-10-27 RX ORDER — ZOLPIDEM TARTRATE 10 MG/1
TABLET ORAL
Qty: 30 TABLET | Refills: 0 | Status: SHIPPED | OUTPATIENT
Start: 2020-10-27 | End: 2020-11-30

## 2020-10-27 NOTE — TELEPHONE ENCOUNTER
LOV: 10/8/20    LAST LAB: n/a    LAST RX:  7/13/20    Next OV:   Future Appointments   Date Time Provider Millicent Victoria   12/2/2020  9:00 AM Guy Bone MD [facility] [place]       PROTOCOL: n/a

## 2020-10-29 NOTE — TELEPHONE ENCOUNTER
Per Kingsley Ventura, patient took last dose yesterday afternoon and came home last night covered in hives. He is not having any difficulty with breathing or swallowing. He did treat with OTC Benadryl.    He was prescribed :   Amoxicillin-Pot Clavulanate 875-125 MG Oral What Type Of Note Output Would You Prefer (Optional)?: Standard Output How Severe Is Your Skin Lesion?: moderate Has Your Skin Lesion Been Treated?: not been treated Is This A New Presentation, Or A Follow-Up?: Skin Lesion

## 2020-11-28 DIAGNOSIS — G47.26 SHIFT WORK SLEEP DISORDER: ICD-10-CM

## 2020-11-30 RX ORDER — ZOLPIDEM TARTRATE 10 MG/1
TABLET ORAL
Qty: 30 TABLET | Refills: 0 | Status: SHIPPED | OUTPATIENT
Start: 2020-11-30 | End: 2020-12-29

## 2020-11-30 NOTE — TELEPHONE ENCOUNTER
LOV 2/19/2020    LAST LAB 07/02/2019    LAST RX  Zolpidem  #30 R0 10/27/2020    Next OV   Future Appointments   Date Time Provider Millicent Victoria   12/2/2020  9:00 AM Manda Hardwick MD [facility] [place]     Presurgical appt on 12/2/20    PROTOCOL

## 2020-12-02 ENCOUNTER — LAB ENCOUNTER (OUTPATIENT)
Dept: LAB | Age: 55
End: 2020-12-02
Attending: FAMILY MEDICINE
Payer: COMMERCIAL

## 2020-12-02 ENCOUNTER — OFFICE VISIT (OUTPATIENT)
Dept: FAMILY MEDICINE CLINIC | Facility: CLINIC | Age: 55
End: 2020-12-02
Payer: COMMERCIAL

## 2020-12-02 ENCOUNTER — HOSPITAL ENCOUNTER (OUTPATIENT)
Dept: GENERAL RADIOLOGY | Age: 55
Discharge: HOME | End: 2020-12-02
Attending: FAMILY MEDICINE
Payer: COMMERCIAL

## 2020-12-02 VITALS
BODY MASS INDEX: 35 KG/M2 | HEART RATE: 82 BPM | SYSTOLIC BLOOD PRESSURE: 138 MMHG | RESPIRATION RATE: 16 BRPM | WEIGHT: 258 LBS | TEMPERATURE: 98 F | DIASTOLIC BLOOD PRESSURE: 88 MMHG

## 2020-12-02 DIAGNOSIS — I10 ESSENTIAL HYPERTENSION: ICD-10-CM

## 2020-12-02 DIAGNOSIS — S83.241S OTHER TEAR OF MEDIAL MENISCUS OF RIGHT KNEE AS CURRENT INJURY, SEQUELA: ICD-10-CM

## 2020-12-02 DIAGNOSIS — Z01.810 PRE-OPERATIVE CARDIOVASCULAR EXAMINATION: ICD-10-CM

## 2020-12-02 DIAGNOSIS — Z01.812 PRE-PROCEDURE LAB EXAM: ICD-10-CM

## 2020-12-02 DIAGNOSIS — Z01.812 PRE-PROCEDURE LAB EXAM: Primary | ICD-10-CM

## 2020-12-02 PROBLEM — S83.221A PERIPHERAL TEAR OF MEDIAL MENISCUS OF RIGHT KNEE AS CURRENT INJURY: Status: ACTIVE | Noted: 2020-10-14

## 2020-12-02 PROBLEM — S83.241A TEAR OF MEDIAL MENISCUS OF RIGHT KNEE, CURRENT, INITIAL ENCOUNTER: Status: ACTIVE | Noted: 2020-10-14

## 2020-12-02 PROCEDURE — 36415 COLL VENOUS BLD VENIPUNCTURE: CPT

## 2020-12-02 PROCEDURE — 3075F SYST BP GE 130 - 139MM HG: CPT | Performed by: FAMILY MEDICINE

## 2020-12-02 PROCEDURE — 3079F DIAST BP 80-89 MM HG: CPT | Performed by: FAMILY MEDICINE

## 2020-12-02 PROCEDURE — 81003 URINALYSIS AUTO W/O SCOPE: CPT

## 2020-12-02 PROCEDURE — 80053 COMPREHEN METABOLIC PANEL: CPT

## 2020-12-02 PROCEDURE — 71046 X-RAY EXAM CHEST 2 VIEWS: CPT | Performed by: FAMILY MEDICINE

## 2020-12-02 PROCEDURE — 85025 COMPLETE CBC W/AUTO DIFF WBC: CPT

## 2020-12-02 PROCEDURE — 85610 PROTHROMBIN TIME: CPT

## 2020-12-02 PROCEDURE — 93000 ELECTROCARDIOGRAM COMPLETE: CPT | Performed by: FAMILY MEDICINE

## 2020-12-02 PROCEDURE — 85730 THROMBOPLASTIN TIME PARTIAL: CPT

## 2020-12-02 PROCEDURE — 99244 OFF/OP CNSLTJ NEW/EST MOD 40: CPT | Performed by: FAMILY MEDICINE

## 2020-12-02 NOTE — H&P
Enrique Mims is a 50year old male.   Patient presents with:  Pre-Op Exam    Chief Complaint Reviewed and Verified  No Further Nursing Notes to Review    Tobacco Reviewed  Allergies Reviewed  Medications Reviewed  Problem   List Reviewed  Medical His LOSARTAN POTASSIUM 50 MG Oral Tab TAKE 1 TABLET(50 MG) BY MOUTH DAILY 90 tablet 0   • Diclofenac Sodium 50 MG Oral Tab EC Take 50 mg by mouth 2 (two) times daily. • Probiotic Product (PROBIOTIC DAILY OR) Take 1 capsule by mouth daily.        • Multiple clear  NECK: supple,no adenopathy,no bruits  LUNGS: clear to auscultation  CARDIO: RRR without murmur  GI: good BS's,no masses, HSM or tenderness  EXTREMITIES: no cyanosis, clubbing or edema    ASSESSMENT AND PLAN:     Diagnoses and all orders for this vis

## 2020-12-15 ENCOUNTER — MED REC SCAN ONLY (OUTPATIENT)
Dept: FAMILY MEDICINE CLINIC | Facility: CLINIC | Age: 55
End: 2020-12-15

## 2020-12-22 NOTE — IMAGING NOTE
Patient phoned to ask about his existing abdominal drain. States when he flushed it last he felt a cool sensation to L side of abdomen sort of like \"cool water\" on the skin. States last two 24 hour periods drain has had less approx 5 ml fluid drained.  I
Unknown

## 2020-12-29 DIAGNOSIS — G47.26 SHIFT WORK SLEEP DISORDER: ICD-10-CM

## 2020-12-29 RX ORDER — ZOLPIDEM TARTRATE 10 MG/1
TABLET ORAL
Qty: 30 TABLET | Refills: 0 | Status: SHIPPED | OUTPATIENT
Start: 2020-12-29 | End: 2021-01-28

## 2020-12-29 NOTE — TELEPHONE ENCOUNTER
Electronically submitted to pharmacy
Last OV: 12/2/20  Last refill: 11/30/20 #30 Tablets w/ 0 refills  Requested Prescriptions     Pending Prescriptions Disp Refills   • ZOLPIDEM TARTRATE 10 MG Oral Tab [Pharmacy Med Name: ZOLPIDEM 10MG TABLETS] 30 tablet 0     Sig: TAKE 1 TABLET BY MOUTH MONO
Oriented - self; Oriented - place; Oriented - time

## 2020-12-30 NOTE — PLAN OF CARE
"SUBJECTIVE:   Rakan Nolasco is a 75 year old male who presents for Preventive Visit.      Patient has been advised of split billing requirements and indicates understanding: Yes   Are you in the first 12 months of your Medicare coverage?  No    Healthy Habits:     In general, how would you rate your overall health?  Good    Frequency of exercise:  1 day/week    Duration of exercise:  Less than 15 minutes    Do you usually eat at least 4 servings of fruit and vegetables a day, include whole grains    & fiber and avoid regularly eating high fat or \"junk\" foods?  No    Medication side effects:  Lightheadedness    Ability to successfully perform activities of daily living:  No assistance needed    Home Safety:  No safety concerns identified    Hearing Impairment:  Difficulty following a conversation in a noisy restaurant or crowded room, feel that people are mumbling or not speaking clearly and need to ask people to speak up or repeat themselves    In the past 6 months, have you been bothered by leaking of urine? Yes    In general, how would you rate your overall mental or emotional health?  Good      PHQ-2 Total Score: 0    Additional concerns today:  Yes    Do you feel safe in your environment? Yes    Have you ever done Advance Care Planning? (For example, a Health Directive, POLST, or a discussion with a medical provider or your loved ones about your wishes): Yes, advance care planning is on file.    Fall risk: Fell in 10/2020 when was sick  Fallen 2 or more times in the past year?: No  Any fall with injury in the past year?: No    Cognitive Screening   1) Repeat 3 items (Leader, Season, Table)    2) Clock draw: NORMAL  3) 3 item recall: Recalls 2 objects   Results: NORMAL clock, 1-2 items recalled: COGNITIVE IMPAIRMENT LESS LIKELY    Mini-CogTM Copyright KORY Reddy. Licensed by the author for use in [employer] [facility]; reprinted with permission (miroslava@.[email or web address]). All rights reserved.      Do you have sleep apnea, " GASTROINTESTINAL - ADULT    • Minimal or absence of nausea and vomiting Progressing    • Maintains adequate nutritional intake (undernourished) Progressing        PAIN - ADULT    • Verbalizes/displays adequate comfort level or patient's stated pain goal Pr excessive snoring or daytime drowsiness?: yes    Reviewed and updated as needed this visit by clinical staff  Tobacco  Allergies  Meds              Reviewed and updated as needed this visit by Provider                Social History     Tobacco Use     Smoking status: Former Smoker     Packs/day: 2.00     Years: 15.00     Pack years: 30.00     Types: Cigarettes     Start date: 1968     Quit date: 1983     Years since quittin.0     Smokeless tobacco: Former User     Quit date: 1970     Tobacco comment: smoke free household.   Substance Use Topics     Alcohol use: Not Currently     Comment: Quit in        Alcohol Use 2020   Prescreen: >3 drinks/day or >7 drinks/week? Not Applicable   Prescreen: >3 drinks/day or >7 drinks/week? -     PROBLEMS TO ADD ON...  1. COPD: was given an inhaler (albuterol and helping). Also on Prednisone for Myasthenia Gravis.  2. Congestion in ear with hearing loss; can pinch and pop it  3. Chest Pain/Leg swelling: Had evaluation by cardiology and everything was okay. Edema is on and off. Cut down on salt.  4. Frequency: Had prostate procedure and is helping.  5. Dizziness: when applies eyedrop or when gets up quickly.    Current providers sharing in care for this patient include:     Patient Care Team:  Telly Lucio MD as PCP - General (Family Practice)  Telly Lucio MD as Assigned PCP  Arpan Harrison MD as Assigned Neuroscience Provider  Josh Emery MD as Assigned Surgical Provider    The following health maintenance items are reviewed in Epic and correct as of today:  Health Maintenance   Topic Date Due     SPIROMETRY  1945     COPD ACTION PLAN  1945     FALL RISK ASSESSMENT  2020     A1C  2020     DIABETIC FOOT EXAM  2020     LIPID  2021     MICROALBUMIN  2021     BMP  2021     EYE EXAM  2021     MEDICARE ANNUAL WELLNESS VISIT  2021     DTAP/TDAP/TD  "IMMUNIZATION (2 - Td) 05/06/2023     ADVANCE CARE PLANNING  12/30/2025     COLORECTAL CANCER SCREENING  09/18/2028     HEPATITIS C SCREENING  Completed     PHQ-2  Completed     INFLUENZA VACCINE  Completed     Pneumococcal Vaccine: 65+ Years  Completed     ZOSTER IMMUNIZATION  Completed     AORTIC ANEURYSM SCREENING (SYSTEM ASSIGNED)  Completed     Pneumococcal Vaccine: Pediatrics (0 to 5 Years) and At-Risk Patients (6 to 64 Years)  Aged Out     IPV IMMUNIZATION  Aged Out     MENINGITIS IMMUNIZATION  Aged Out           Review of Systems   Constitutional: Negative for chills and fever.   HENT: Positive for congestion and hearing loss. Negative for ear pain and sore throat.    Eyes: Negative for pain and visual disturbance.   Respiratory: Positive for shortness of breath. Negative for cough.    Cardiovascular: Positive for chest pain and peripheral edema. Negative for palpitations.   Gastrointestinal: Negative for abdominal pain, constipation, diarrhea, heartburn, hematochezia and nausea.   Genitourinary: Positive for frequency, impotence and urgency. Negative for discharge, dysuria, genital sores and hematuria.   Musculoskeletal: Negative for arthralgias, joint swelling and myalgias.   Skin: Negative for rash.   Neurological: Positive for dizziness. Negative for paresthesias.   Psychiatric/Behavioral: Negative for mood changes. The patient is not nervous/anxious.      OBJECTIVE:   /75   Pulse 64   Temp 97.9  F (36.6  C) (Oral)   Ht 1.744 m (5' 8.66\")   Wt 112.8 kg (248 lb 9.6 oz)   SpO2 97%   BMI 37.08 kg/m   Estimated body mass index is 37.08 kg/m  as calculated from the following:    Height as of this encounter: 1.744 m (5' 8.66\").    Weight as of this encounter: 112.8 kg (248 lb 9.6 oz).  Physical Exam  GENERAL: healthy, alert and no distress  EYES: Eyes grossly normal to inspection, PERRL and conjunctivae and sclerae normal  HENT: ear canals and TM's normal, nose and mouth without ulcers or " "lesions  NECK: no adenopathy and thyroid normal to palpation  RESP: lungs clear to auscultation - no rales, rhonchi or wheezes  CV: regular rate and rhythm, normal S1 S2, no S3 or S4, no murmur, click or rub, no peripheral edema and peripheral pulses strong  ABDOMEN: soft, obese, nontender, no masses and bowel sounds normal  MS: no gross musculoskeletal defects noted, no edema  SKIN: no suspicious lesions or rashes  NEURO: Normal strength and tone, mentation intact and speech normal  PSYCH: mentation appears normal, affect normal/bright    Diagnostic Test Results:  Labs reviewed in Epic    ASSESSMENT / PLAN:   Rakan was seen today for physical and health maintenance.    Diagnoses and all orders for this visit:    Encounter for annual wellness exam in Medicare patient  -     HEMOGLOBIN A1C      Patient has been advised of split billing requirements and indicates understanding: Yes  COUNSELING:  Reviewed preventive health counseling, as reflected in patient instructions       Regular exercise       Healthy diet/nutrition    Estimated body mass index is 37.08 kg/m  as calculated from the following:    Height as of this encounter: 1.744 m (5' 8.66\").    Weight as of this encounter: 112.8 kg (248 lb 9.6 oz).    Weight management plan: Discussed healthy diet and exercise guidelines    He reports that he quit smoking about 38 years ago. His smoking use included cigarettes. He started smoking about 53 years ago. He has a 30.00 pack-year smoking history. He quit smokeless tobacco use about 51 years ago.      Appropriate preventive services were discussed with this patient, including applicable screening as appropriate for cardiovascular disease, diabetes, osteopenia/osteoporosis, and glaucoma.  As appropriate for age/gender, discussed screening for colorectal cancer, prostate cancer, breast cancer, and cervical cancer. Checklist reviewing preventive services available has been given to the patient.    Reviewed patients plan of " care and provided an AVS. The Basic Care Plan (routine screening as documented in Health Maintenance) for Rakan meets the Care Plan requirement. This Care Plan has been established and reviewed with the Patient.    Counseling Resources:  ATP IV Guidelines  Pooled Cohorts Equation Calculator  Breast Cancer Risk Calculator  Breast Cancer: Medication to Reduce Risk  FRAX Risk Assessment  ICSI Preventive Guidelines  Dietary Guidelines for Americans, 2010  [employer]'s MyPlate  ASA Prophylaxis  Lung CA Screening    Telly Lucio MD  [facility]    Identified Health Risks:

## 2021-01-13 DIAGNOSIS — I10 ESSENTIAL HYPERTENSION: ICD-10-CM

## 2021-01-13 RX ORDER — LOSARTAN POTASSIUM 50 MG/1
TABLET ORAL
Qty: 90 TABLET | Refills: 0 | Status: SHIPPED | OUTPATIENT
Start: 2021-01-13 | End: 2021-04-13

## 2021-01-13 NOTE — TELEPHONE ENCOUNTER
LOV: 10-    LAST LAB: 12-2-2020    LAST RX:   Medication Quantity Refills Start End   LOSARTAN POTASSIUM 50 MG Oral Tab 90 tablet 0 10/15/2020    Sig: Sánchez Lucas 1 TABLET(50 MG) BY MOUTH DAILY           Next OV: No future appointments.      PROTOCOL  Hyp

## 2021-01-27 DIAGNOSIS — G47.26 SHIFT WORK SLEEP DISORDER: ICD-10-CM

## 2021-01-28 RX ORDER — ZOLPIDEM TARTRATE 10 MG/1
TABLET ORAL
Qty: 30 TABLET | Refills: 0 | Status: SHIPPED | OUTPATIENT
Start: 2021-01-28 | End: 2021-02-26

## 2021-01-28 NOTE — TELEPHONE ENCOUNTER
Last refill #30 on 12/29/2020  Last office visit pertaining to refill on 12/2/2020  No future appointments.

## 2021-02-26 DIAGNOSIS — G47.26 SHIFT WORK SLEEP DISORDER: ICD-10-CM

## 2021-02-26 RX ORDER — ZOLPIDEM TARTRATE 10 MG/1
TABLET ORAL
Qty: 30 TABLET | Refills: 0 | Status: SHIPPED | OUTPATIENT
Start: 2021-02-26 | End: 2021-03-29

## 2021-02-26 NOTE — TELEPHONE ENCOUNTER
LOV 12/02/2020    LAST LAB 12/02/2020    LAST RX  Zolpidem #30 R0 01/28/2021    Next OV No future appointments.     PROTOCOL

## 2021-03-16 ENCOUNTER — MED REC SCAN ONLY (OUTPATIENT)
Dept: FAMILY MEDICINE CLINIC | Facility: CLINIC | Age: 56
End: 2021-03-16

## 2021-03-27 DIAGNOSIS — G47.26 SHIFT WORK SLEEP DISORDER: ICD-10-CM

## 2021-03-29 RX ORDER — ZOLPIDEM TARTRATE 10 MG/1
TABLET ORAL
Qty: 30 TABLET | Refills: 0 | Status: SHIPPED | OUTPATIENT
Start: 2021-03-29 | End: 2021-04-27

## 2021-04-13 DIAGNOSIS — I10 ESSENTIAL HYPERTENSION: ICD-10-CM

## 2021-04-13 RX ORDER — LOSARTAN POTASSIUM 50 MG/1
TABLET ORAL
Qty: 90 TABLET | Refills: 0 | Status: SHIPPED | OUTPATIENT
Start: 2021-04-13 | End: 2021-08-07

## 2021-04-13 NOTE — TELEPHONE ENCOUNTER
LOV: 12-2-2020    LAST LAB:12-2-2020    LAST RX:    Medication Quantity Refills Start End   LOSARTAN POTASSIUM 50 MG Oral Tab 90 tablet 0 1/13/2021    Sig:   TAKE 1 TABLET(50 MG) BY MOUTH DAILY             Next OV: No future appointments.          PROTOCOL

## 2021-04-26 DIAGNOSIS — G47.26 SHIFT WORK SLEEP DISORDER: ICD-10-CM

## 2021-04-27 RX ORDER — ZOLPIDEM TARTRATE 10 MG/1
TABLET ORAL
Qty: 30 TABLET | Refills: 0 | Status: SHIPPED | OUTPATIENT
Start: 2021-04-27 | End: 2021-05-25

## 2021-04-27 NOTE — TELEPHONE ENCOUNTER
LOV 12/02/2020    LAST LAB 12/02/2020    LAST RX  Zolpidem #30 R0 03/29/2021    Next OV No future appointments.     PROTOCOL

## 2021-05-24 DIAGNOSIS — G47.26 SHIFT WORK SLEEP DISORDER: ICD-10-CM

## 2021-05-25 RX ORDER — ZOLPIDEM TARTRATE 10 MG/1
TABLET ORAL
Qty: 30 TABLET | Refills: 0 | Status: SHIPPED | OUTPATIENT
Start: 2021-05-25 | End: 2021-06-24

## 2021-05-25 NOTE — TELEPHONE ENCOUNTER
LOV 12/02/2020    LAST LAB 12/02/2020    LAST RX  Zolpidem #30 R0 04/27/2021    Next OV No future appointments.     PROTOCOL

## 2021-05-27 ENCOUNTER — TELEPHONE (OUTPATIENT)
Dept: FAMILY MEDICINE CLINIC | Facility: CLINIC | Age: 56
End: 2021-05-27

## 2021-05-27 DIAGNOSIS — Z13.1 SCREENING FOR DIABETES MELLITUS: ICD-10-CM

## 2021-05-27 DIAGNOSIS — Z13.0 SCREENING, ANEMIA, DEFICIENCY, IRON: ICD-10-CM

## 2021-05-27 DIAGNOSIS — Z13.220 SCREENING, LIPID: ICD-10-CM

## 2021-05-27 DIAGNOSIS — I10 ESSENTIAL HYPERTENSION: Primary | ICD-10-CM

## 2021-05-27 NOTE — TELEPHONE ENCOUNTER
Spoke with patient and informed him he is due for an office visit and fasting lab work.  Lab orders faxed to [facility].   Future Appointments   Date Time Provider Millicent Victoria   7/7/2021  1:00 PM William Nina MD [facility] [place]

## 2021-06-23 DIAGNOSIS — G47.26 SHIFT WORK SLEEP DISORDER: ICD-10-CM

## 2021-06-24 RX ORDER — ZOLPIDEM TARTRATE 10 MG/1
TABLET ORAL
Qty: 30 TABLET | Refills: 0 | Status: SHIPPED | OUTPATIENT
Start: 2021-06-24 | End: 2021-07-19

## 2021-06-24 NOTE — TELEPHONE ENCOUNTER
Last refill #30 on 5/25/2021  Last office visit pertaining to refill on 12/2/2020  Future Appointments   Date Time Provider Millicent Victoria   7/7/2021  1:00 PM Jorge Santillan MD [facility] [place]

## 2021-07-07 ENCOUNTER — OFFICE VISIT (OUTPATIENT)
Dept: FAMILY MEDICINE CLINIC | Facility: CLINIC | Age: 56
End: 2021-07-07
Payer: COMMERCIAL

## 2021-07-07 VITALS
SYSTOLIC BLOOD PRESSURE: 132 MMHG | BODY MASS INDEX: 33.32 KG/M2 | HEART RATE: 63 BPM | HEIGHT: 72.24 IN | DIASTOLIC BLOOD PRESSURE: 76 MMHG | RESPIRATION RATE: 16 BRPM | OXYGEN SATURATION: 97 % | WEIGHT: 246 LBS

## 2021-07-07 DIAGNOSIS — Z00.00 WELL ADULT EXAM: Primary | ICD-10-CM

## 2021-07-07 DIAGNOSIS — K57.90 DIVERTICULOSIS OF INTESTINE WITHOUT BLEEDING, UNSPECIFIED INTESTINAL TRACT LOCATION: ICD-10-CM

## 2021-07-07 DIAGNOSIS — I10 ESSENTIAL HYPERTENSION: ICD-10-CM

## 2021-07-07 DIAGNOSIS — Z23 NEED FOR TDAP VACCINATION: ICD-10-CM

## 2021-07-07 DIAGNOSIS — Z96.652 HISTORY OF TOTAL KNEE ARTHROPLASTY, LEFT: ICD-10-CM

## 2021-07-07 DIAGNOSIS — N40.0 BENIGN PROSTATIC HYPERPLASIA WITHOUT LOWER URINARY TRACT SYMPTOMS: Chronic | ICD-10-CM

## 2021-07-07 DIAGNOSIS — E66.9 OBESITY (BMI 30.0-34.9): ICD-10-CM

## 2021-07-07 DIAGNOSIS — K40.90 NON-RECURRENT UNILATERAL INGUINAL HERNIA WITHOUT OBSTRUCTION OR GANGRENE: ICD-10-CM

## 2021-07-07 PROBLEM — S83.221A PERIPHERAL TEAR OF MEDIAL MENISCUS OF RIGHT KNEE AS CURRENT INJURY: Status: RESOLVED | Noted: 2020-10-14 | Resolved: 2021-07-07

## 2021-07-07 PROCEDURE — 90471 IMMUNIZATION ADMIN: CPT | Performed by: FAMILY MEDICINE

## 2021-07-07 PROCEDURE — 3075F SYST BP GE 130 - 139MM HG: CPT | Performed by: FAMILY MEDICINE

## 2021-07-07 PROCEDURE — 90715 TDAP VACCINE 7 YRS/> IM: CPT | Performed by: FAMILY MEDICINE

## 2021-07-07 PROCEDURE — 99396 PREV VISIT EST AGE 40-64: CPT | Performed by: FAMILY MEDICINE

## 2021-07-07 PROCEDURE — 3008F BODY MASS INDEX DOCD: CPT | Performed by: FAMILY MEDICINE

## 2021-07-07 PROCEDURE — 3078F DIAST BP <80 MM HG: CPT | Performed by: FAMILY MEDICINE

## 2021-07-08 NOTE — PROGRESS NOTES
Natalie Vicente is a 49year old male.     CC:  Patient presents with:  Physical      HPI/Subjective:    Chief Complaint Reviewed and Verified  No Further Nursing Notes to Review    Tobacco Reviewed  Allergies Reviewed  Medications Reviewed  Problem Problem Relation Age of Onset   • Hypertension Father    • Thyroid disease Mother    • Cancer Paternal Grandmother         breast      Family Status   Relation Status   • Fa Alive   • Mo Alive   • Sarah Beth Alive   • Son Alive   • [employer] Alive   • Bro Alive history  ALL/ASTHMA: denies hx of allergy or asthma    Objective:    EXAM:   Blood pressure 132/76, pulse 63, resp. rate 16, height 6' 0.24\" (1.835 m), weight 246 lb (111.6 kg), SpO2 97 %. Body mass index is 33.14 kg/m².       Reviewed by Dr Apryl Day encounter       Imaging & Consults:  TETANUS, DIPHTHERIA TOXOIDS AND ACELLULAR PERTUSIS VACCINE (TDAP), >7 YEARS, IM USE  SURGERY - INTERNAL    No follow-ups on file.     Spring Roa M.D., FAAFP

## 2021-07-18 DIAGNOSIS — G47.26 SHIFT WORK SLEEP DISORDER: ICD-10-CM

## 2021-07-19 ENCOUNTER — OFFICE VISIT (OUTPATIENT)
Dept: SURGERY | Facility: CLINIC | Age: 56
End: 2021-07-19
Payer: COMMERCIAL

## 2021-07-19 VITALS
DIASTOLIC BLOOD PRESSURE: 76 MMHG | BODY MASS INDEX: 33.32 KG/M2 | SYSTOLIC BLOOD PRESSURE: 119 MMHG | HEART RATE: 74 BPM | HEIGHT: 72 IN | WEIGHT: 246 LBS | TEMPERATURE: 98 F

## 2021-07-19 DIAGNOSIS — K40.90 RIGHT INGUINAL HERNIA: Primary | ICD-10-CM

## 2021-07-19 PROCEDURE — 3074F SYST BP LT 130 MM HG: CPT | Performed by: SURGERY

## 2021-07-19 PROCEDURE — 3078F DIAST BP <80 MM HG: CPT | Performed by: SURGERY

## 2021-07-19 PROCEDURE — 3008F BODY MASS INDEX DOCD: CPT | Performed by: SURGERY

## 2021-07-19 PROCEDURE — 99244 OFF/OP CNSLTJ NEW/EST MOD 40: CPT | Performed by: SURGERY

## 2021-07-19 RX ORDER — ZOLPIDEM TARTRATE 10 MG/1
TABLET ORAL
Qty: 30 TABLET | Refills: 0 | Status: SHIPPED | OUTPATIENT
Start: 2021-07-19 | End: 2021-08-23

## 2021-07-19 NOTE — TELEPHONE ENCOUNTER
Electronically submitted to pharmacy
Last refill: 06/24/21  Qty: 30  W/ 0 refills  Last ov: 07/07/21    Requested Prescriptions     Pending Prescriptions Disp Refills   • ZOLPIDEM TARTRATE 10 MG Oral Tab [Pharmacy Med Name: ZOLPIDEM 10MG TABLETS] 30 tablet 0     Sig: TAKE 1 TABLET BY MOUTH EV
no

## 2021-07-19 NOTE — H&P
New Patient Visit Note       Active Problems      1. Right inguinal hernia        Chief Complaint   Patient presents with:  Hernia: Patient here for right inguinal hernia. c/o right pain and constipation. pt states if it pop out he has to push it back in. Quit date: 2015        Years since quittin.4      Smokeless tobacco: Never Used    Vaping Use      Vaping Use: Never used    Substance and Sexual Activity      Alcohol use:  Yes        Alcohol/week: 5.0 standard drinks        Types: 5 Cans of bee Does not bruise/bleed easily. Psychiatric/Behavioral: Negative for behavioral problems and sleep disturbance.        Physical Findings   /76   Pulse 74   Temp 98.4 °F (36.9 °C) (Temporal)   Ht 72\"   Wt 246 lb (111.6 kg)   BMI 33.36 kg/m²   Physical pectoral adenopathy. Left upper body: No supraclavicular or pectoral adenopathy. Skin:     General: Skin is warm and dry. Neurological:      Mental Status: He is alert and oriented to person, place, and time.    Psychiatric:         Speech: Speech

## 2021-07-27 ENCOUNTER — TELEPHONE (OUTPATIENT)
Dept: SURGERY | Facility: CLINIC | Age: 56
End: 2021-07-27

## 2021-07-27 DIAGNOSIS — K40.90 NON-RECURRENT UNILATERAL INGUINAL HERNIA WITHOUT OBSTRUCTION OR GANGRENE: Primary | ICD-10-CM

## 2021-08-07 DIAGNOSIS — I10 ESSENTIAL HYPERTENSION: ICD-10-CM

## 2021-08-07 RX ORDER — LOSARTAN POTASSIUM 50 MG/1
TABLET ORAL
Qty: 90 TABLET | Refills: 0 | Status: SHIPPED | OUTPATIENT
Start: 2021-08-07 | End: 2021-11-01

## 2021-08-07 NOTE — TELEPHONE ENCOUNTER
LOV 07/07/2021    LAST LAB 06/21/2021    LAST RX  Losartan #90 R0 04/13/2021    Next OV No future appointments.     PROTOCOL  Hypertension Medications Protocol [id number]/07/2021 10:29 AM   CMP or BMP in past 12 months Protocol Details    Last serum creatinin

## 2021-08-21 DIAGNOSIS — G47.26 SHIFT WORK SLEEP DISORDER: ICD-10-CM

## 2021-08-23 RX ORDER — ZOLPIDEM TARTRATE 10 MG/1
TABLET ORAL
Qty: 30 TABLET | Refills: 1 | Status: SHIPPED | OUTPATIENT
Start: 2021-08-23 | End: 2021-10-25

## 2021-08-23 NOTE — TELEPHONE ENCOUNTER
Last refill: 07/19/21  Qty: 30  W/ 0 refills  Last ov: 07/07/21    Requested Prescriptions     Pending Prescriptions Disp Refills   • ZOLPIDEM 10 MG Oral Tab [Pharmacy Med Name: ZOLPIDEM 10MG TABLETS] 30 tablet 0     Sig: TAKE 1 TABLET BY MOUTH EVERY NIGHT

## 2021-08-25 ENCOUNTER — OFFICE VISIT (OUTPATIENT)
Dept: FAMILY MEDICINE CLINIC | Facility: CLINIC | Age: 56
End: 2021-08-25
Payer: COMMERCIAL

## 2021-08-25 VITALS
HEART RATE: 72 BPM | TEMPERATURE: 97 F | DIASTOLIC BLOOD PRESSURE: 80 MMHG | WEIGHT: 243 LBS | SYSTOLIC BLOOD PRESSURE: 120 MMHG | BODY MASS INDEX: 32.91 KG/M2 | RESPIRATION RATE: 12 BRPM | HEIGHT: 72 IN

## 2021-08-25 DIAGNOSIS — Z01.818 PRE-PROCEDURAL EXAMINATION: Primary | ICD-10-CM

## 2021-08-25 DIAGNOSIS — Z01.810 PREOP CARDIOVASCULAR EXAM: ICD-10-CM

## 2021-08-25 DIAGNOSIS — K40.90 RIGHT INGUINAL HERNIA: ICD-10-CM

## 2021-08-25 DIAGNOSIS — I10 ESSENTIAL HYPERTENSION: ICD-10-CM

## 2021-08-25 PROCEDURE — 3008F BODY MASS INDEX DOCD: CPT | Performed by: FAMILY MEDICINE

## 2021-08-25 PROCEDURE — 3079F DIAST BP 80-89 MM HG: CPT | Performed by: FAMILY MEDICINE

## 2021-08-25 PROCEDURE — 99244 OFF/OP CNSLTJ NEW/EST MOD 40: CPT | Performed by: FAMILY MEDICINE

## 2021-08-25 PROCEDURE — 3074F SYST BP LT 130 MM HG: CPT | Performed by: FAMILY MEDICINE

## 2021-08-25 PROCEDURE — 93000 ELECTROCARDIOGRAM COMPLETE: CPT | Performed by: FAMILY MEDICINE

## 2021-08-25 NOTE — PROGRESS NOTES
PRE-OP Physical   What testing is needed for this surgery/patient? H&P EKG    What is the full name of procedure/ surgery?   Larparoscopic right inguinal hernia repair with mesh     Date being surgery or procedure is being done?  8-    What is the d

## 2021-08-25 NOTE — H&P
Pia Lamb is a 49year old male. Patient presents with:  Pre-Op Exam: inrm .  5    Chief Complaint Reviewed and Verified  Nursing Notes Reviewed and   Verified  Tobacco Reviewed  Allergies Reviewed  Medications Reviewed    Problem List Reviewed Age of Onset   • Hypertension Father    • Thyroid disease Mother    • Cancer Paternal Grandmother         breast       Current Outpatient Medications   Medication Sig Dispense Refill   • Loratadine-Pseudoephedrine (CLARITIN-D 24 HOUR OR) Take by mouth as n and denies heartburn  NEURO: denies headaches    EXAM:   /80 (BP Location: Right arm, Patient Position: Sitting, Cuff Size: large)   Pulse 72   Temp 96.8 °F (36 °C) (Temporal)   Resp 12   Ht 6' (1.829 m)   Wt 243 lb (110.2 kg)   BMI 32.96 kg/m²  Body

## 2021-08-27 ENCOUNTER — LAB ENCOUNTER (OUTPATIENT)
Dept: LAB | Age: 56
End: 2021-08-27
Attending: SURGERY
Payer: COMMERCIAL

## 2021-08-27 DIAGNOSIS — K40.90 NON-RECURRENT UNILATERAL INGUINAL HERNIA WITHOUT OBSTRUCTION OR GANGRENE: ICD-10-CM

## 2021-08-29 LAB — SARS-COV-2 RNA RESP QL NAA+PROBE: NOT DETECTED

## 2021-08-30 ENCOUNTER — ANESTHESIA (OUTPATIENT)
Dept: SURGERY | Facility: HOSPITAL | Age: 56
End: 2021-08-30
Payer: COMMERCIAL

## 2021-08-30 ENCOUNTER — HOSPITAL ENCOUNTER (OUTPATIENT)
Facility: HOSPITAL | Age: 56
Setting detail: HOSPITAL OUTPATIENT SURGERY
Discharge: HOME | End: 2021-08-30
Attending: SURGERY | Admitting: SURGERY
Payer: COMMERCIAL

## 2021-08-30 ENCOUNTER — ANESTHESIA EVENT (OUTPATIENT)
Dept: SURGERY | Facility: HOSPITAL | Age: 56
End: 2021-08-30
Payer: COMMERCIAL

## 2021-08-30 VITALS
SYSTOLIC BLOOD PRESSURE: 129 MMHG | HEIGHT: 72 IN | TEMPERATURE: 97 F | BODY MASS INDEX: 32.51 KG/M2 | OXYGEN SATURATION: 99 % | WEIGHT: 240 LBS | RESPIRATION RATE: 16 BRPM | HEART RATE: 55 BPM | DIASTOLIC BLOOD PRESSURE: 72 MMHG

## 2021-08-30 DIAGNOSIS — K40.90 NON-RECURRENT UNILATERAL INGUINAL HERNIA WITHOUT OBSTRUCTION OR GANGRENE: Primary | ICD-10-CM

## 2021-08-30 PROCEDURE — 0YU54JZ SUPPLEMENT RIGHT INGUINAL REGION WITH SYNTHETIC SUBSTITUTE, PERCUTANEOUS ENDOSCOPIC APPROACH: ICD-10-PCS | Performed by: SURGERY

## 2021-08-30 DEVICE — BARD MESH
Type: IMPLANTABLE DEVICE | Site: INGUINAL | Status: FUNCTIONAL
Brand: BARD MESH

## 2021-08-30 RX ORDER — ROCURONIUM BROMIDE 10 MG/ML
INJECTION, SOLUTION INTRAVENOUS AS NEEDED
Status: DISCONTINUED | OUTPATIENT
Start: 2021-08-30 | End: 2021-08-30 | Stop reason: SURG

## 2021-08-30 RX ORDER — EPHEDRINE SULFATE 50 MG/ML
INJECTION INTRAVENOUS AS NEEDED
Status: DISCONTINUED | OUTPATIENT
Start: 2021-08-30 | End: 2021-08-30 | Stop reason: SURG

## 2021-08-30 RX ORDER — HYDROCODONE BITARTRATE AND ACETAMINOPHEN 10; 325 MG/1; MG/1
1 TABLET ORAL AS NEEDED
Status: COMPLETED | OUTPATIENT
Start: 2021-08-30 | End: 2021-08-30

## 2021-08-30 RX ORDER — CLINDAMYCIN PHOSPHATE 900 MG/50ML
900 INJECTION INTRAVENOUS ONCE
Status: COMPLETED | OUTPATIENT
Start: 2021-08-30 | End: 2021-08-30

## 2021-08-30 RX ORDER — ONDANSETRON 2 MG/ML
4 INJECTION INTRAMUSCULAR; INTRAVENOUS AS NEEDED
Status: DISCONTINUED | OUTPATIENT
Start: 2021-08-30 | End: 2021-08-30

## 2021-08-30 RX ORDER — NEOSTIGMINE METHYLSULFATE 1 MG/ML
INJECTION INTRAVENOUS AS NEEDED
Status: DISCONTINUED | OUTPATIENT
Start: 2021-08-30 | End: 2021-08-30 | Stop reason: SURG

## 2021-08-30 RX ORDER — HYDROCODONE BITARTRATE AND ACETAMINOPHEN 10; 325 MG/1; MG/1
TABLET ORAL
Status: DISCONTINUED
Start: 2021-08-30 | End: 2021-08-30

## 2021-08-30 RX ORDER — ONDANSETRON 2 MG/ML
INJECTION INTRAMUSCULAR; INTRAVENOUS AS NEEDED
Status: DISCONTINUED | OUTPATIENT
Start: 2021-08-30 | End: 2021-08-30 | Stop reason: SURG

## 2021-08-30 RX ORDER — METOCLOPRAMIDE HYDROCHLORIDE 5 MG/ML
10 INJECTION INTRAMUSCULAR; INTRAVENOUS AS NEEDED
Status: DISCONTINUED | OUTPATIENT
Start: 2021-08-30 | End: 2021-08-30

## 2021-08-30 RX ORDER — CLINDAMYCIN PHOSPHATE 900 MG/50ML
INJECTION INTRAVENOUS
Status: DISCONTINUED
Start: 2021-08-30 | End: 2021-08-30

## 2021-08-30 RX ORDER — ACETAMINOPHEN 500 MG
1000 TABLET ORAL EVERY 6 HOURS PRN
COMMUNITY

## 2021-08-30 RX ORDER — MIDAZOLAM HYDROCHLORIDE 1 MG/ML
1 INJECTION INTRAMUSCULAR; INTRAVENOUS EVERY 5 MIN PRN
Status: DISCONTINUED | OUTPATIENT
Start: 2021-08-30 | End: 2021-08-30

## 2021-08-30 RX ORDER — KETOROLAC TROMETHAMINE 30 MG/ML
INJECTION, SOLUTION INTRAMUSCULAR; INTRAVENOUS
Status: COMPLETED
Start: 2021-08-30 | End: 2021-08-30

## 2021-08-30 RX ORDER — ACETAMINOPHEN 500 MG
1000 TABLET ORAL ONCE
Status: DISCONTINUED | OUTPATIENT
Start: 2021-08-30 | End: 2021-08-30

## 2021-08-30 RX ORDER — BUPIVACAINE HYDROCHLORIDE AND EPINEPHRINE 5; 5 MG/ML; UG/ML
INJECTION, SOLUTION EPIDURAL; INTRACAUDAL; PERINEURAL AS NEEDED
Status: DISCONTINUED | OUTPATIENT
Start: 2021-08-30 | End: 2021-08-30 | Stop reason: HOSPADM

## 2021-08-30 RX ORDER — PHENYLEPHRINE HCL 10 MG/ML
VIAL (ML) INJECTION AS NEEDED
Status: DISCONTINUED | OUTPATIENT
Start: 2021-08-30 | End: 2021-08-30 | Stop reason: SURG

## 2021-08-30 RX ORDER — MEPERIDINE HYDROCHLORIDE 25 MG/ML
12.5 INJECTION INTRAMUSCULAR; INTRAVENOUS; SUBCUTANEOUS AS NEEDED
Status: DISCONTINUED | OUTPATIENT
Start: 2021-08-30 | End: 2021-08-30

## 2021-08-30 RX ORDER — NALOXONE HYDROCHLORIDE 0.4 MG/ML
80 INJECTION, SOLUTION INTRAMUSCULAR; INTRAVENOUS; SUBCUTANEOUS AS NEEDED
Status: DISCONTINUED | OUTPATIENT
Start: 2021-08-30 | End: 2021-08-30

## 2021-08-30 RX ORDER — HYDROCODONE BITARTRATE AND ACETAMINOPHEN 10; 325 MG/1; MG/1
2 TABLET ORAL AS NEEDED
Status: COMPLETED | OUTPATIENT
Start: 2021-08-30 | End: 2021-08-30

## 2021-08-30 RX ORDER — LIDOCAINE HYDROCHLORIDE 10 MG/ML
INJECTION, SOLUTION EPIDURAL; INFILTRATION; INTRACAUDAL; PERINEURAL AS NEEDED
Status: DISCONTINUED | OUTPATIENT
Start: 2021-08-30 | End: 2021-08-30 | Stop reason: SURG

## 2021-08-30 RX ORDER — KETOROLAC TROMETHAMINE 30 MG/ML
30 INJECTION, SOLUTION INTRAMUSCULAR; INTRAVENOUS ONCE
Status: COMPLETED | OUTPATIENT
Start: 2021-08-30 | End: 2021-08-30

## 2021-08-30 RX ORDER — GLYCOPYRROLATE 0.2 MG/ML
INJECTION, SOLUTION INTRAMUSCULAR; INTRAVENOUS AS NEEDED
Status: DISCONTINUED | OUTPATIENT
Start: 2021-08-30 | End: 2021-08-30 | Stop reason: SURG

## 2021-08-30 RX ORDER — DEXAMETHASONE SODIUM PHOSPHATE 4 MG/ML
VIAL (ML) INJECTION AS NEEDED
Status: DISCONTINUED | OUTPATIENT
Start: 2021-08-30 | End: 2021-08-30 | Stop reason: SURG

## 2021-08-30 RX ORDER — HYDROCODONE BITARTRATE AND ACETAMINOPHEN 5; 325 MG/1; MG/1
1 TABLET ORAL EVERY 6 HOURS PRN
Qty: 20 TABLET | Refills: 0 | Status: SHIPPED | OUTPATIENT
Start: 2021-08-30

## 2021-08-30 RX ORDER — HEPARIN SODIUM 5000 [USP'U]/ML
INJECTION, SOLUTION INTRAVENOUS; SUBCUTANEOUS
Status: COMPLETED
Start: 2021-08-30 | End: 2021-08-30

## 2021-08-30 RX ORDER — HEPARIN SODIUM 5000 [USP'U]/ML
5000 INJECTION, SOLUTION INTRAVENOUS; SUBCUTANEOUS ONCE
Status: COMPLETED | OUTPATIENT
Start: 2021-08-30 | End: 2021-08-30

## 2021-08-30 RX ORDER — SODIUM CHLORIDE, SODIUM LACTATE, POTASSIUM CHLORIDE, CALCIUM CHLORIDE 600; 310; 30; 20 MG/100ML; MG/100ML; MG/100ML; MG/100ML
INJECTION, SOLUTION INTRAVENOUS CONTINUOUS
Status: DISCONTINUED | OUTPATIENT
Start: 2021-08-30 | End: 2021-08-30

## 2021-08-30 RX ORDER — HYDROMORPHONE HYDROCHLORIDE 1 MG/ML
0.4 INJECTION, SOLUTION INTRAMUSCULAR; INTRAVENOUS; SUBCUTANEOUS EVERY 5 MIN PRN
Status: DISCONTINUED | OUTPATIENT
Start: 2021-08-30 | End: 2021-08-30

## 2021-08-30 RX ORDER — HYDROMORPHONE HYDROCHLORIDE 1 MG/ML
INJECTION, SOLUTION INTRAMUSCULAR; INTRAVENOUS; SUBCUTANEOUS
Status: COMPLETED
Start: 2021-08-30 | End: 2021-08-30

## 2021-08-30 RX ADMIN — NEOSTIGMINE METHYLSULFATE 4 MG: 1 INJECTION INTRAVENOUS at 08:24:00

## 2021-08-30 RX ADMIN — PHENYLEPHRINE HCL 100 MCG: 10 MG/ML VIAL (ML) INJECTION at 07:43:00

## 2021-08-30 RX ADMIN — ROCURONIUM BROMIDE 50 MG: 10 INJECTION, SOLUTION INTRAVENOUS at 07:34:00

## 2021-08-30 RX ADMIN — CLINDAMYCIN PHOSPHATE 900 MG: 900 INJECTION INTRAVENOUS at 07:39:00

## 2021-08-30 RX ADMIN — EPHEDRINE SULFATE 10 MG: 50 INJECTION INTRAVENOUS at 07:47:00

## 2021-08-30 RX ADMIN — PHENYLEPHRINE HCL 100 MCG: 10 MG/ML VIAL (ML) INJECTION at 07:47:00

## 2021-08-30 RX ADMIN — DEXAMETHASONE SODIUM PHOSPHATE 8 MG: 4 MG/ML VIAL (ML) INJECTION at 08:09:00

## 2021-08-30 RX ADMIN — GLYCOPYRROLATE 0.8 MG: 0.2 INJECTION, SOLUTION INTRAMUSCULAR; INTRAVENOUS at 08:24:00

## 2021-08-30 RX ADMIN — ONDANSETRON 4 MG: 2 INJECTION INTRAMUSCULAR; INTRAVENOUS at 08:19:00

## 2021-08-30 RX ADMIN — LIDOCAINE HYDROCHLORIDE 50 MG: 10 INJECTION, SOLUTION EPIDURAL; INFILTRATION; INTRACAUDAL; PERINEURAL at 07:33:00

## 2021-08-30 RX ADMIN — EPHEDRINE SULFATE 10 MG: 50 INJECTION INTRAVENOUS at 07:52:00

## 2021-08-30 RX ADMIN — PHENYLEPHRINE HCL 100 MCG: 10 MG/ML VIAL (ML) INJECTION at 07:52:00

## 2021-08-30 NOTE — ANESTHESIA POSTPROCEDURE EVALUATION
[facility] Patient Status:  Hospital Outpatient Surgery   Age/Gender 49year old male MRN [id number]   [facility] SURGERY Attending Gabe Junior MD   Hosp Day # 0 PCP Darci Bell MD       Anesthesia Post-o

## 2021-08-30 NOTE — ANESTHESIA PROCEDURE NOTES
Airway  Date/Time: 8/30/2021 7:35 AM  Urgency: elective    Airway not difficult    General Information and Staff    Patient location during procedure: OR  Anesthesiologist: Augustus Larios MD  Performed: anesthesiologist     Indications and Patient Cond

## 2021-08-30 NOTE — H&P
History & Physical Examination    Patient Name: Quentin Haque  MRN: [id number]  CSN: [id number]  YOB: 1965    Diagnosis: Right inguinal hernia    Present Illness:  The patient is a 19-year-old female known to me for prior low anterior res HIVES  Sulfa Antibiotics       ANAPHYLAXIS    Comment:Throat felt like it was swollen and flushed    Past Medical History:   Diagnosis Date   • BPH (benign prostatic hyperplasia)    • Diverticulitis    • Essential hypertension    • Osteoarthritis     olivia

## 2021-08-30 NOTE — ANESTHESIA PREPROCEDURE EVALUATION
PRE-OP EVALUATION    Patient Name: Junior Rodriguez    Admit Diagnosis: Non-recurrent unilateral inguinal hernia without obstruction or gangrene [K40.90]    Pre-op Diagnosis: Non-recurrent unilateral inguinal hernia without obstruction or gangrene [K40. Augmentin [Amoxicillin-Pot Clavulanate], Erythromycin, Penicillins, and Sulfa Antibiotics      Anesthesia Evaluation    Patient summary reviewed.     Anesthetic Complications           GI/Hepatic/Renal                                 Cardiovascular status verified and patient meets guidelines.           Plan/risks discussed with: patient and spouse                Present on Admission:  **None**

## 2021-08-30 NOTE — OPERATIVE REPORT
[facility]  Op Note    Fortino Jovel Location: OR   CSN [id number] MRN [id number]   Admission Date 8/30/2021 Operation Date 8/30/2021   Attending Physician Sofia Lee MD Operating Physician Denisse Pike MD   DATE OF OPERATION:  8/ placed head down. The peritoneum was grasped and incised using scissors with cautery. Blunt dissection was then used to dissect out the pubic tubercle as well as the lateral attachments. The hernia was then reduced into the abdomen.   The mesh was soaked

## 2021-09-07 ENCOUNTER — MED REC SCAN ONLY (OUTPATIENT)
Dept: SURGERY | Facility: CLINIC | Age: 56
End: 2021-09-07

## 2021-09-08 ENCOUNTER — OFFICE VISIT (OUTPATIENT)
Dept: SURGERY | Facility: CLINIC | Age: 56
End: 2021-09-08

## 2021-09-08 VITALS — TEMPERATURE: 98 F | HEIGHT: 72 IN | WEIGHT: 240 LBS | BODY MASS INDEX: 32.51 KG/M2

## 2021-09-08 DIAGNOSIS — K40.90 NON-RECURRENT UNILATERAL INGUINAL HERNIA WITHOUT OBSTRUCTION OR GANGRENE: Primary | ICD-10-CM

## 2021-09-08 PROCEDURE — 3008F BODY MASS INDEX DOCD: CPT | Performed by: PHYSICIAN ASSISTANT

## 2021-09-08 PROCEDURE — 99024 POSTOP FOLLOW-UP VISIT: CPT | Performed by: PHYSICIAN ASSISTANT

## 2021-09-08 NOTE — PROGRESS NOTES
As covering physician for Dr. Fernandez, I have been asked to refill prescription of Ambien.   - I reviewed the most recent clinic note regarding this medication, dated: 10/1/2018   - I reviewed the [place] Prescribers database regarding dispense pattern: no concerns    Given the above, it appears that this refill is consistent with my partners thought process and prescribing pattern.    Refill sent to pharmacy.      Giuliana Rodrigues MD      Post Operative Visit Note       Active Problems  No diagnosis found. Chief Complaint   Patient presents with:  Post-Op: PO 8/30 rt ing hernia- PT states has pain 7/10. PT denies n/ fever chills states normal BMs. PT denies drainage.          History of name: Not on file      Number of children: Not on file      Years of education: Not on file      Highest education level: Not on file    Tobacco Use      Smoking status: Former Smoker        Years: 30.00        Quit date: 2/11/2015        Years since [facility] swallowing. Respiratory: Negative for apnea, cough, shortness of breath and wheezing. Cardiovascular: Negative for chest pain, palpitations and leg swelling.    Gastrointestinal: Negative for abdominal distention, abdominal pain, anal bleeding, blood possible, use ice and heat, and a scrotal support. The patient will continue to avoid lifting anything greater than 20 pounds for 6 weeks total.    I advised patient to attempt Benadryl for the his skin irritation.   I advised patient to not rub ointment

## 2021-09-20 ENCOUNTER — OFFICE VISIT (OUTPATIENT)
Dept: SURGERY | Facility: CLINIC | Age: 56
End: 2021-09-20

## 2021-09-20 VITALS
HEIGHT: 72 IN | BODY MASS INDEX: 32.51 KG/M2 | SYSTOLIC BLOOD PRESSURE: 123 MMHG | HEART RATE: 64 BPM | WEIGHT: 240 LBS | TEMPERATURE: 99 F | DIASTOLIC BLOOD PRESSURE: 83 MMHG

## 2021-09-20 DIAGNOSIS — K40.90 RIGHT INGUINAL HERNIA: Primary | ICD-10-CM

## 2021-09-20 DIAGNOSIS — Z09 FOLLOW UP: ICD-10-CM

## 2021-09-20 DIAGNOSIS — Z98.890 S/P LEFT INGUINAL HERNIA REPAIR: ICD-10-CM

## 2021-09-20 DIAGNOSIS — Z87.19 S/P LEFT INGUINAL HERNIA REPAIR: ICD-10-CM

## 2021-09-20 PROBLEM — K40.30 INCARCERATED LEFT INGUINAL HERNIA: Status: ACTIVE | Noted: 2021-09-20

## 2021-09-20 PROBLEM — K40.30 INCARCERATED LEFT INGUINAL HERNIA: Status: RESOLVED | Noted: 2021-09-20 | Resolved: 2021-09-20

## 2021-09-20 PROCEDURE — 3008F BODY MASS INDEX DOCD: CPT | Performed by: PHYSICIAN ASSISTANT

## 2021-09-20 PROCEDURE — 99024 POSTOP FOLLOW-UP VISIT: CPT | Performed by: PHYSICIAN ASSISTANT

## 2021-09-20 PROCEDURE — 3079F DIAST BP 80-89 MM HG: CPT | Performed by: PHYSICIAN ASSISTANT

## 2021-09-20 PROCEDURE — 3074F SYST BP LT 130 MM HG: CPT | Performed by: PHYSICIAN ASSISTANT

## 2021-09-20 NOTE — PROGRESS NOTES
Post Operative Visit Note       Active Problems  1. Right inguinal hernia    2. S/P left inguinal hernia repair    3.  Follow up         Chief Complaint   Patient presents with:  Hernia: PO - 8/30 hernia repair [place] - increased pain around incision and bruis 08/30/2021   • KNEE REPLACEMENT SURGERY      Aug 2018,Left knee   • OTHER SURGICAL HISTORY      LEFT KNEE ACL RECONSTRUCTION   • SINUS SURGERY        deviated septum and sinus surgery   • TONSILLECTOMY         The family history and social history have bee taking: Reported on 9/20/2021), Disp: , Rfl:   •  Multiple Vitamin (MULTIVITAMIN TAB/CAP), Take 1 tablet by mouth daily.  (Patient not taking: Reported on 9/20/2021), Disp: , Rfl:       Review of Systems  The Review of Systems has been reviewed by me during Tenderness: There is no abdominal tenderness. There is no guarding or rebound. Comments: Abdomen is nondistended, without tympany to percussion. Soft, mildly tender to palpation in right lower quadrant. No rebound or guarding.   No peritoneal sig with any lifting, twisting, bending, pushing, or pulling. · Follow-up as needed. The patient is encouraged to contact our office with any worsening symptoms, questions, or concerns. · This plan was discussed with the patient.   All of the patient's quest

## 2021-10-24 DIAGNOSIS — G47.26 SHIFT WORK SLEEP DISORDER: ICD-10-CM

## 2021-10-25 RX ORDER — ZOLPIDEM TARTRATE 10 MG/1
TABLET ORAL
Qty: 30 TABLET | Refills: 0 | Status: SHIPPED | OUTPATIENT
Start: 2021-10-25 | End: 2021-11-29

## 2021-10-25 NOTE — TELEPHONE ENCOUNTER
Requested Prescriptions     Pending Prescriptions Disp Refills   • ZOLPIDEM 10 MG Oral Tab [Pharmacy Med Name: ZOLPIDEM 10MG TABLETS] 30 tablet 0     Sig: TAKE 1 TABLET BY MOUTH EVERY NIGHT     Last refill #30 x 1 on 8/23/2021  Last office visit pertaining

## 2021-10-31 DIAGNOSIS — I10 ESSENTIAL HYPERTENSION: ICD-10-CM

## 2021-11-01 RX ORDER — LOSARTAN POTASSIUM 50 MG/1
TABLET ORAL
Qty: 90 TABLET | Refills: 0 | Status: SHIPPED | OUTPATIENT
Start: 2021-11-01 | End: 2022-02-07

## 2021-11-01 NOTE — TELEPHONE ENCOUNTER
Last refill: 08/07/21  Qty: 90  W/ 0 refills  Last ov: 08/25/21    Requested Prescriptions     Pending Prescriptions Disp Refills   • LOSARTAN 50 MG Oral Tab [Pharmacy Med Name: LOSARTAN 50MG TABLETS] 90 tablet 0     Sig: TAKE 1 TABLET(50 MG) BY MOUTH Raphael Burns

## 2021-11-11 ENCOUNTER — OFFICE VISIT (OUTPATIENT)
Dept: SURGERY | Facility: CLINIC | Age: 56
End: 2021-11-11
Payer: COMMERCIAL

## 2021-11-11 VITALS
WEIGHT: 240 LBS | BODY MASS INDEX: 32.51 KG/M2 | HEIGHT: 72 IN | TEMPERATURE: 97 F | SYSTOLIC BLOOD PRESSURE: 135 MMHG | DIASTOLIC BLOOD PRESSURE: 86 MMHG | HEART RATE: 56 BPM

## 2021-11-11 DIAGNOSIS — K57.92 DIVERTICULITIS: Primary | ICD-10-CM

## 2021-11-11 PROCEDURE — 3008F BODY MASS INDEX DOCD: CPT | Performed by: SURGERY

## 2021-11-11 PROCEDURE — 3079F DIAST BP 80-89 MM HG: CPT | Performed by: SURGERY

## 2021-11-11 PROCEDURE — 99213 OFFICE O/P EST LOW 20 MIN: CPT | Performed by: SURGERY

## 2021-11-11 PROCEDURE — 3075F SYST BP GE 130 - 139MM HG: CPT | Performed by: SURGERY

## 2021-11-11 NOTE — H&P
New Patient Visit Note       Active Problems      1. Diverticulitis        Chief Complaint   Patient presents with:  Diverticulitis: NP - DIVERTIC - PT STATES HE HAS A H/O DIVERTIC ABOUT THREE YEARS AGO.  PT STATES THIS PAST WEEK HE BELIEVES HE HAD A RECENT Cynthia Calles at [facility] in [place]. Per the report, both the endoscopy and colonoscopy showed no abnormal findings. The patient has no other recent imaging.     The patient has past medical history of hypertension, BPH, osteoarthritis and tobacco: Never Used    Vaping Use      Vaping Use: Never used    Substance and Sexual Activity      Alcohol use:  Yes        Alcohol/week: 5.0 standard drinks        Types: 5 Cans of beer per week        Comment: \"weekends\"      Drug use: No      Sexual a chest pain, palpitations and leg swelling. Gastrointestinal: Positive for abdominal pain. Negative for abdominal distention, anal bleeding, blood in stool, constipation, diarrhea, nausea and vomiting.         Indigestion   Genitourinary: Negative for diff Behavior: Behavior normal.             Assessment   Diverticulitis  (primary encounter diagnosis)      Plan     · Abdominal pain of uncertain etiology. · No acute peritoneal signs. · No evidence of recurrent hernia.   · I will obtain a CT scan of the abdo

## 2021-11-15 ENCOUNTER — HOSPITAL ENCOUNTER (OUTPATIENT)
Dept: CT IMAGING | Age: 56
Discharge: HOME | End: 2021-11-15
Attending: STUDENT IN AN ORGANIZED HEALTH CARE EDUCATION/TRAINING PROGRAM
Payer: COMMERCIAL

## 2021-11-15 DIAGNOSIS — K57.92 DIVERTICULITIS: ICD-10-CM

## 2021-11-15 PROCEDURE — 74177 CT ABD & PELVIS W/CONTRAST: CPT | Performed by: STUDENT IN AN ORGANIZED HEALTH CARE EDUCATION/TRAINING PROGRAM

## 2021-11-15 PROCEDURE — 82565 ASSAY OF CREATININE: CPT

## 2021-11-23 ENCOUNTER — TELEPHONE (OUTPATIENT)
Dept: SURGERY | Facility: CLINIC | Age: 56
End: 2021-11-23

## 2021-11-24 ENCOUNTER — TELEPHONE (OUTPATIENT)
Dept: SURGERY | Facility: CLINIC | Age: 56
End: 2021-11-24

## 2021-11-24 NOTE — TELEPHONE ENCOUNTER
Nehemias Jewell MA spoke with Dr. Amy Wakefield regarding patients CT results. Per Dr. Amy Wakefield, CT normal and no indication noted on scan to reflect patients pain. If patient is having continued pain, he is to follow up with PCP or GI.  Nehemias Jewell informed myself of Dr. Ca Cavazos

## 2021-11-24 NOTE — TELEPHONE ENCOUNTER
Patient returned call. Patient was notified and v/u. States placed a call to GI already and was placed on medications to help with pain.

## 2021-11-28 DIAGNOSIS — G47.26 SHIFT WORK SLEEP DISORDER: ICD-10-CM

## 2021-11-29 RX ORDER — ZOLPIDEM TARTRATE 10 MG/1
TABLET ORAL
Qty: 30 TABLET | Refills: 0 | Status: SHIPPED | OUTPATIENT
Start: 2021-11-29 | End: 2021-12-27

## 2021-11-29 NOTE — TELEPHONE ENCOUNTER
LOV 08/25/2021    LAST LAB 06/21/2021    LAST RX  Zolpidem #30 R0 10/25/2021     Next OV No future appointments.     PROTOCOL

## 2021-12-23 DIAGNOSIS — G47.26 SHIFT WORK SLEEP DISORDER: ICD-10-CM

## 2021-12-27 RX ORDER — ZOLPIDEM TARTRATE 10 MG/1
TABLET ORAL
Qty: 30 TABLET | Refills: 0 | Status: SHIPPED | OUTPATIENT
Start: 2021-12-27 | End: 2022-01-31

## 2021-12-27 NOTE — TELEPHONE ENCOUNTER
Last refill: 11/29/21  Qty: 30  W/ 0 refills  Last ov: 08/25/21    Requested Prescriptions     Pending Prescriptions Disp Refills   • ZOLPIDEM 10 MG Oral Tab [Pharmacy Med Name: ZOLPIDEM 10MG TABLETS] 30 tablet 0     Sig: TAKE 1 TABLET BY MOUTH EVERY NIGHT

## 2022-01-25 NOTE — ED INITIAL ASSESSMENT (HPI)
Pt here recent bronchitis dx. Was on augmentin and physician changed him to sulfa. Pt took sulfa for first time last night but concerned because after he took it his throat got very tight and felt like he couldn't swallow, hot and flushed.  Now sinus issues Quality 110: Preventive Care And Screening: Influenza Immunization: Influenza Immunization previously received during influenza season Detail Level: Detailed

## 2022-01-29 DIAGNOSIS — G47.26 SHIFT WORK SLEEP DISORDER: ICD-10-CM

## 2022-01-29 NOTE — TELEPHONE ENCOUNTER
Last office visit: 8/25/21  Last refill: 12/27/21  Labs Due: 6/24/22  No future appointments.   Name from pharmacy: ZOLPIDEM 10MG TABLETS          Will file in chart as: ZOLPIDEM 10 MG Oral Tab    Sig: TAKE 1 TABLET BY MOUTH EVERY NIGHT    Disp:  30 tablet

## 2022-01-31 RX ORDER — ZOLPIDEM TARTRATE 10 MG/1
TABLET ORAL
Qty: 30 TABLET | Refills: 0 | Status: SHIPPED | OUTPATIENT
Start: 2022-01-31

## 2022-02-07 RX ORDER — LOSARTAN POTASSIUM 50 MG/1
TABLET ORAL
Qty: 90 TABLET | Refills: 0 | Status: SHIPPED | OUTPATIENT
Start: 2022-02-07

## 2022-03-01 RX ORDER — ZOLPIDEM TARTRATE 10 MG/1
TABLET ORAL
Qty: 30 TABLET | Refills: 0 | Status: SHIPPED | OUTPATIENT
Start: 2022-03-01 | End: 2022-03-31

## 2022-03-01 NOTE — TELEPHONE ENCOUNTER
LOV 08/25/2021-pre-op; 08/08/2021    LAST LAB 06/21/2021    LAST RX  Zolpidem #30 R0 01/31/2022    Next OV No future appointments.     PROTOCOL

## 2022-03-31 RX ORDER — ZOLPIDEM TARTRATE 10 MG/1
TABLET ORAL
Qty: 30 TABLET | Refills: 0 | Status: SHIPPED | OUTPATIENT
Start: 2022-03-31

## 2022-03-31 NOTE — TELEPHONE ENCOUNTER
No protocol for medication. Last office visit:  08/25/21  Last refill:  03/01/22  #30, no refills      No future appointments.

## 2022-04-11 ENCOUNTER — MED REC SCAN ONLY (OUTPATIENT)
Dept: FAMILY MEDICINE CLINIC | Facility: CLINIC | Age: 57
End: 2022-04-11

## 2022-05-02 RX ORDER — ZOLPIDEM TARTRATE 10 MG/1
TABLET ORAL
Qty: 30 TABLET | Refills: 0 | Status: SHIPPED | OUTPATIENT
Start: 2022-05-02

## 2022-05-06 ENCOUNTER — MED REC SCAN ONLY (OUTPATIENT)
Dept: FAMILY MEDICINE CLINIC | Facility: CLINIC | Age: 57
End: 2022-05-06

## 2022-05-07 RX ORDER — LOSARTAN POTASSIUM 50 MG/1
TABLET ORAL
Qty: 90 TABLET | Refills: 0 | Status: SHIPPED | OUTPATIENT
Start: 2022-05-07

## 2022-06-01 DIAGNOSIS — G47.26 SHIFT WORK SLEEP DISORDER: ICD-10-CM

## 2022-06-01 RX ORDER — ZOLPIDEM TARTRATE 10 MG/1
10 TABLET ORAL NIGHTLY
Qty: 30 TABLET | Refills: 1 | Status: SHIPPED | OUTPATIENT
Start: 2022-06-01

## 2022-06-01 NOTE — TELEPHONE ENCOUNTER
LOV 08/25/2021    LAST LAB 06/21/2021    LAST RX  Zolpidem #30 R0 05/02/2022    Next OV No future appointments.     PROTOCOL

## 2022-07-27 DIAGNOSIS — G47.26 SHIFT WORK SLEEP DISORDER: ICD-10-CM

## 2022-07-27 RX ORDER — ZOLPIDEM TARTRATE 10 MG/1
TABLET ORAL
Qty: 30 TABLET | Refills: 0 | Status: SHIPPED | OUTPATIENT
Start: 2022-07-27

## 2022-08-05 DIAGNOSIS — I10 ESSENTIAL HYPERTENSION: ICD-10-CM

## 2022-08-08 RX ORDER — LOSARTAN POTASSIUM 50 MG/1
TABLET ORAL
Qty: 90 TABLET | Refills: 0 | Status: SHIPPED | OUTPATIENT
Start: 2022-08-08

## 2022-08-08 NOTE — TELEPHONE ENCOUNTER
Hypertension Medications Protocol Failed 08/05/2022 11:52 AM   Protocol Details  CMP or BMP in past 12 months    Appointment in past 6 or next 3 months    Last serum creatinine< 2.0        Last office visit: 08/25/21    Last refill:  05/07/22  #90, no refills  Last cmp:  06/21/21  BP Readings from Last 3 Encounters:  11/11/21 : 135/86  09/20/21 : 123/83  08/30/21 : 129/72    No future appointments. Sending [facility] [place].

## 2022-08-27 DIAGNOSIS — G47.26 SHIFT WORK SLEEP DISORDER: ICD-10-CM

## 2022-08-29 RX ORDER — ZOLPIDEM TARTRATE 10 MG/1
TABLET ORAL
Qty: 30 TABLET | Refills: 0 | Status: SHIPPED | OUTPATIENT
Start: 2022-08-29

## 2022-09-28 DIAGNOSIS — G47.26 SHIFT WORK SLEEP DISORDER: ICD-10-CM

## 2022-09-28 RX ORDER — ZOLPIDEM TARTRATE 10 MG/1
10 TABLET ORAL NIGHTLY
Qty: 30 TABLET | Refills: 0 | Status: SHIPPED | OUTPATIENT
Start: 2022-09-28

## 2022-09-28 NOTE — TELEPHONE ENCOUNTER
No protocol    Last office visit:  08/25/21  Last refill:  08/29/22  #30, no refills  Last lab:  06/21/21      No future appointments.   [facility]

## 2022-10-05 ENCOUNTER — OFFICE VISIT (OUTPATIENT)
Dept: FAMILY MEDICINE CLINIC | Facility: CLINIC | Age: 57
End: 2022-10-05
Payer: COMMERCIAL

## 2022-10-05 VITALS
TEMPERATURE: 97 F | SYSTOLIC BLOOD PRESSURE: 130 MMHG | DIASTOLIC BLOOD PRESSURE: 80 MMHG | WEIGHT: 246 LBS | HEIGHT: 72 IN | OXYGEN SATURATION: 98 % | BODY MASS INDEX: 33.32 KG/M2 | HEART RATE: 91 BPM | RESPIRATION RATE: 12 BRPM

## 2022-10-05 DIAGNOSIS — I10 ESSENTIAL HYPERTENSION: ICD-10-CM

## 2022-10-05 DIAGNOSIS — Z13.220 SCREENING, LIPID: ICD-10-CM

## 2022-10-05 DIAGNOSIS — K57.90 DIVERTICULOSIS OF INTESTINE WITHOUT BLEEDING, UNSPECIFIED INTESTINAL TRACT LOCATION: ICD-10-CM

## 2022-10-05 DIAGNOSIS — E66.9 OBESITY (BMI 30.0-34.9): ICD-10-CM

## 2022-10-05 DIAGNOSIS — Z13.1 SCREENING FOR DIABETES MELLITUS: ICD-10-CM

## 2022-10-05 DIAGNOSIS — Z00.00 WELL ADULT EXAM: Primary | ICD-10-CM

## 2022-10-05 DIAGNOSIS — N40.0 BENIGN PROSTATIC HYPERPLASIA WITHOUT LOWER URINARY TRACT SYMPTOMS: ICD-10-CM

## 2022-10-05 DIAGNOSIS — Z13.0 SCREENING, ANEMIA, DEFICIENCY, IRON: ICD-10-CM

## 2022-10-05 DIAGNOSIS — K57.92 DIVERTICULITIS: ICD-10-CM

## 2022-10-05 DIAGNOSIS — Z00.00 LABORATORY EXAMINATION ORDERED AS PART OF A ROUTINE GENERAL MEDICAL EXAMINATION: ICD-10-CM

## 2022-10-05 DIAGNOSIS — D12.6 TUBULAR ADENOMA OF COLON: ICD-10-CM

## 2022-10-05 LAB
ALBUMIN SERPL-MCNC: 4.2 G/DL (ref 3.4–5)
ALBUMIN/GLOB SERPL: 1.4 {RATIO} (ref 1–2)
ALP LIVER SERPL-CCNC: 67 U/L
ALT SERPL-CCNC: 23 U/L
ANION GAP SERPL CALC-SCNC: 3 MMOL/L (ref 0–18)
AST SERPL-CCNC: 20 U/L (ref 15–37)
BASOPHILS # BLD AUTO: 0.05 X10(3) UL (ref 0–0.2)
BASOPHILS NFR BLD AUTO: 0.7 %
BILIRUB SERPL-MCNC: 0.7 MG/DL (ref 0.1–2)
BUN BLD-MCNC: 12 MG/DL (ref 7–18)
CALCIUM BLD-MCNC: 9.2 MG/DL (ref 8.5–10.1)
CHLORIDE SERPL-SCNC: 109 MMOL/L (ref 98–112)
CHOLEST SERPL-MCNC: 185 MG/DL (ref ?–200)
CO2 SERPL-SCNC: 26 MMOL/L (ref 21–32)
CREAT BLD-MCNC: 0.83 MG/DL
EOSINOPHIL # BLD AUTO: 0.24 X10(3) UL (ref 0–0.7)
EOSINOPHIL NFR BLD AUTO: 3.5 %
ERYTHROCYTE [DISTWIDTH] IN BLOOD BY AUTOMATED COUNT: 12.3 %
FASTING PATIENT LIPID ANSWER: NO
FASTING STATUS PATIENT QL REPORTED: NO
GFR SERPLBLD BASED ON 1.73 SQ M-ARVRAT: 103 ML/MIN/1.73M2 (ref 60–?)
GLOBULIN PLAS-MCNC: 3.1 G/DL (ref 2.8–4.4)
GLUCOSE BLD-MCNC: 116 MG/DL (ref 70–99)
HCT VFR BLD AUTO: 42.4 %
HDLC SERPL-MCNC: 47 MG/DL (ref 40–59)
HGB BLD-MCNC: 14.2 G/DL
IMM GRANULOCYTES # BLD AUTO: 0.02 X10(3) UL (ref 0–1)
IMM GRANULOCYTES NFR BLD: 0.3 %
LDLC SERPL CALC-MCNC: 100 MG/DL (ref ?–100)
LYMPHOCYTES # BLD AUTO: 1.24 X10(3) UL (ref 1–4)
LYMPHOCYTES NFR BLD AUTO: 18.2 %
MCH RBC QN AUTO: 28.7 PG (ref 26–34)
MCHC RBC AUTO-ENTMCNC: 33.5 G/DL (ref 31–37)
MCV RBC AUTO: 85.7 FL
MONOCYTES # BLD AUTO: 0.68 X10(3) UL (ref 0.1–1)
MONOCYTES NFR BLD AUTO: 10 %
NEUTROPHILS # BLD AUTO: 4.58 X10 (3) UL (ref 1.5–7.7)
NEUTROPHILS # BLD AUTO: 4.58 X10(3) UL (ref 1.5–7.7)
NEUTROPHILS NFR BLD AUTO: 67.3 %
NONHDLC SERPL-MCNC: 138 MG/DL (ref ?–130)
OSMOLALITY SERPL CALC.SUM OF ELEC: 287 MOSM/KG (ref 275–295)
PLATELET # BLD AUTO: 183 10(3)UL (ref 150–450)
POTASSIUM SERPL-SCNC: 3.8 MMOL/L (ref 3.5–5.1)
PROT SERPL-MCNC: 7.3 G/DL (ref 6.4–8.2)
RBC # BLD AUTO: 4.95 X10(6)UL
SODIUM SERPL-SCNC: 138 MMOL/L (ref 136–145)
TRIGL SERPL-MCNC: 220 MG/DL (ref 30–149)
VLDLC SERPL CALC-MCNC: 37 MG/DL (ref 0–30)
WBC # BLD AUTO: 6.8 X10(3) UL (ref 4–11)

## 2022-10-05 PROCEDURE — 85025 COMPLETE CBC W/AUTO DIFF WBC: CPT | Performed by: FAMILY MEDICINE

## 2022-10-05 PROCEDURE — 99396 PREV VISIT EST AGE 40-64: CPT | Performed by: FAMILY MEDICINE

## 2022-10-05 PROCEDURE — 3008F BODY MASS INDEX DOCD: CPT | Performed by: FAMILY MEDICINE

## 2022-10-05 PROCEDURE — 80053 COMPREHEN METABOLIC PANEL: CPT | Performed by: FAMILY MEDICINE

## 2022-10-05 PROCEDURE — 3079F DIAST BP 80-89 MM HG: CPT | Performed by: FAMILY MEDICINE

## 2022-10-05 PROCEDURE — 3075F SYST BP GE 130 - 139MM HG: CPT | Performed by: FAMILY MEDICINE

## 2022-10-05 PROCEDURE — 80061 LIPID PANEL: CPT | Performed by: FAMILY MEDICINE

## 2022-10-05 RX ORDER — LEVOFLOXACIN 750 MG/1
750 TABLET ORAL DAILY
Qty: 14 TABLET | Refills: 0 | Status: SHIPPED | OUTPATIENT
Start: 2022-10-05 | End: 2022-10-19

## 2022-10-05 RX ORDER — FAMOTIDINE 40 MG/1
40 TABLET, FILM COATED ORAL 2 TIMES DAILY
COMMUNITY
Start: 2022-07-11

## 2022-10-25 DIAGNOSIS — G47.26 SHIFT WORK SLEEP DISORDER: ICD-10-CM

## 2022-10-26 RX ORDER — ZOLPIDEM TARTRATE 10 MG/1
TABLET ORAL
Qty: 30 TABLET | Refills: 1 | Status: SHIPPED | OUTPATIENT
Start: 2022-10-26

## 2022-11-02 ENCOUNTER — IMMUNIZATION (OUTPATIENT)
Dept: FAMILY MEDICINE CLINIC | Facility: CLINIC | Age: 57
End: 2022-11-02
Payer: COMMERCIAL

## 2022-11-02 DIAGNOSIS — Z23 NEED FOR VACCINATION: Primary | ICD-10-CM

## 2022-11-02 PROCEDURE — 90471 IMMUNIZATION ADMIN: CPT | Performed by: FAMILY MEDICINE

## 2022-11-02 PROCEDURE — 90686 IIV4 VACC NO PRSV 0.5 ML IM: CPT | Performed by: FAMILY MEDICINE

## 2022-11-10 DIAGNOSIS — I10 ESSENTIAL HYPERTENSION: ICD-10-CM

## 2022-11-10 RX ORDER — LOSARTAN POTASSIUM 50 MG/1
TABLET ORAL
Qty: 90 TABLET | Refills: 0 | Status: SHIPPED | OUTPATIENT
Start: 2022-11-10

## 2022-11-23 ENCOUNTER — MED REC SCAN ONLY (OUTPATIENT)
Dept: FAMILY MEDICINE CLINIC | Facility: CLINIC | Age: 57
End: 2022-11-23

## 2022-11-29 ENCOUNTER — OFFICE VISIT (OUTPATIENT)
Dept: FAMILY MEDICINE CLINIC | Facility: CLINIC | Age: 57
End: 2022-11-29
Payer: COMMERCIAL

## 2022-11-29 VITALS
DIASTOLIC BLOOD PRESSURE: 80 MMHG | HEIGHT: 72 IN | HEART RATE: 87 BPM | WEIGHT: 252 LBS | RESPIRATION RATE: 12 BRPM | BODY MASS INDEX: 34.13 KG/M2 | SYSTOLIC BLOOD PRESSURE: 136 MMHG | TEMPERATURE: 97 F | OXYGEN SATURATION: 97 %

## 2022-11-29 DIAGNOSIS — I70.0 ATHEROSCLEROSIS OF AORTA (HCC): Primary | ICD-10-CM

## 2022-11-29 PROCEDURE — 3079F DIAST BP 80-89 MM HG: CPT | Performed by: FAMILY MEDICINE

## 2022-11-29 PROCEDURE — 3008F BODY MASS INDEX DOCD: CPT | Performed by: FAMILY MEDICINE

## 2022-11-29 PROCEDURE — 3075F SYST BP GE 130 - 139MM HG: CPT | Performed by: FAMILY MEDICINE

## 2022-11-29 PROCEDURE — 99213 OFFICE O/P EST LOW 20 MIN: CPT | Performed by: FAMILY MEDICINE

## 2022-12-22 DIAGNOSIS — G47.26 SHIFT WORK SLEEP DISORDER: ICD-10-CM

## 2022-12-23 RX ORDER — ZOLPIDEM TARTRATE 10 MG/1
TABLET ORAL
Qty: 30 TABLET | Refills: 1 | Status: SHIPPED | OUTPATIENT
Start: 2022-12-23

## 2023-02-06 DIAGNOSIS — I10 ESSENTIAL HYPERTENSION: ICD-10-CM

## 2023-02-06 RX ORDER — LOSARTAN POTASSIUM 50 MG/1
TABLET ORAL
Qty: 90 TABLET | Refills: 1 | Status: SHIPPED | OUTPATIENT
Start: 2023-02-06

## 2023-02-06 NOTE — TELEPHONE ENCOUNTER
KNZ:08[birth year]08-15    LAST LAB:10-5-22    LAST RX:     Medication Quantity Refills Start End   losartan 50 MG Oral Tab 90 tablet 0 11/10/2022    Sig: Marleny Antu 1 TABLET(50 MG) BY MOUTH DAILY         Next OV: No future appointments.        PROTOCOL  Hypertension Medications Protocol Passed 02/06/2023 01:09 PM   Protocol Details  CMP or BMP in past 12 months    Last serum creatinine< 2.0    Appointment in past 6 or next 3 months

## 2023-02-10 ENCOUNTER — OFFICE VISIT (OUTPATIENT)
Dept: FAMILY MEDICINE CLINIC | Facility: CLINIC | Age: 58
End: 2023-02-10
Payer: COMMERCIAL

## 2023-02-10 VITALS
HEIGHT: 72 IN | SYSTOLIC BLOOD PRESSURE: 126 MMHG | OXYGEN SATURATION: 98 % | TEMPERATURE: 97 F | BODY MASS INDEX: 34 KG/M2 | WEIGHT: 251 LBS | HEART RATE: 64 BPM | DIASTOLIC BLOOD PRESSURE: 70 MMHG | RESPIRATION RATE: 14 BRPM

## 2023-02-10 DIAGNOSIS — H93.8X3 CONGESTION OF BOTH EARS: ICD-10-CM

## 2023-02-10 DIAGNOSIS — J02.9 SORE THROAT: Primary | ICD-10-CM

## 2023-02-10 DIAGNOSIS — R09.81 NASAL SINUS CONGESTION: ICD-10-CM

## 2023-02-10 DIAGNOSIS — J06.9 VIRAL URI: ICD-10-CM

## 2023-02-10 LAB
CONTROL LINE PRESENT WITH A CLEAR BACKGROUND (YES/NO): YES YES/NO
KIT LOT #: 5064 NUMERIC
STREP GRP A CUL-SCR: NEGATIVE

## 2023-02-10 PROCEDURE — 3008F BODY MASS INDEX DOCD: CPT

## 2023-02-10 PROCEDURE — 87081 CULTURE SCREEN ONLY: CPT

## 2023-02-10 PROCEDURE — 3078F DIAST BP <80 MM HG: CPT

## 2023-02-10 PROCEDURE — 3074F SYST BP LT 130 MM HG: CPT

## 2023-02-10 PROCEDURE — 99213 OFFICE O/P EST LOW 20 MIN: CPT

## 2023-02-10 PROCEDURE — 87880 STREP A ASSAY W/OPTIC: CPT

## 2023-02-19 DIAGNOSIS — G47.26 SHIFT WORK SLEEP DISORDER: ICD-10-CM

## 2023-02-21 RX ORDER — ZOLPIDEM TARTRATE 10 MG/1
TABLET ORAL
Qty: 30 TABLET | Refills: 3 | Status: SHIPPED | OUTPATIENT
Start: 2023-02-21

## 2023-06-12 ENCOUNTER — OFFICE VISIT (OUTPATIENT)
Dept: FAMILY MEDICINE CLINIC | Facility: CLINIC | Age: 58
End: 2023-06-12
Payer: COMMERCIAL

## 2023-06-12 VITALS
WEIGHT: 242.38 LBS | TEMPERATURE: 98 F | HEART RATE: 91 BPM | BODY MASS INDEX: 33 KG/M2 | SYSTOLIC BLOOD PRESSURE: 136 MMHG | OXYGEN SATURATION: 99 % | RESPIRATION RATE: 12 BRPM | DIASTOLIC BLOOD PRESSURE: 88 MMHG

## 2023-06-12 DIAGNOSIS — K64.4 EXTERNAL HEMORRHOID: Primary | ICD-10-CM

## 2023-06-12 PROCEDURE — 3075F SYST BP GE 130 - 139MM HG: CPT | Performed by: FAMILY MEDICINE

## 2023-06-12 PROCEDURE — 3079F DIAST BP 80-89 MM HG: CPT | Performed by: FAMILY MEDICINE

## 2023-06-12 PROCEDURE — 99213 OFFICE O/P EST LOW 20 MIN: CPT | Performed by: FAMILY MEDICINE

## 2023-06-12 RX ORDER — HYDROCORTISONE 25 MG/G
CREAM TOPICAL
Qty: 30 G | Refills: 0 | Status: SHIPPED | OUTPATIENT
Start: 2023-06-12

## 2023-06-18 DIAGNOSIS — G47.26 SHIFT WORK SLEEP DISORDER: ICD-10-CM

## 2023-06-19 RX ORDER — ZOLPIDEM TARTRATE 10 MG/1
TABLET ORAL
Qty: 30 TABLET | Refills: 0 | Status: SHIPPED | OUTPATIENT
Start: 2023-06-19

## 2023-06-26 ENCOUNTER — TELEPHONE (OUTPATIENT)
Dept: FAMILY MEDICINE CLINIC | Facility: CLINIC | Age: 58
End: 2023-06-26

## 2023-07-19 DIAGNOSIS — G47.26 SHIFT WORK SLEEP DISORDER: ICD-10-CM

## 2023-07-19 RX ORDER — ZOLPIDEM TARTRATE 10 MG/1
10 TABLET ORAL NIGHTLY
Qty: 30 TABLET | Refills: 3 | Status: SHIPPED | OUTPATIENT
Start: 2023-07-19

## 2023-07-19 NOTE — TELEPHONE ENCOUNTER
LOV:2-10-23  LAST LAB:10-05-22  LAST RX:  Medication Quantity Refills Start End   ZOLPIDEM 10 MG Oral Tab 30 tablet 0 6/19/2023    Sig:   TAKE 1 TABLET(10 MG) BY MOUTH EVERY NIGHT     Next OV: No future appointments.    PROTOCOL: none

## 2023-07-20 ENCOUNTER — TELEPHONE (OUTPATIENT)
Dept: FAMILY MEDICINE CLINIC | Facility: CLINIC | Age: 58
End: 2023-07-20

## 2023-07-20 ENCOUNTER — MED REC SCAN ONLY (OUTPATIENT)
Dept: FAMILY MEDICINE CLINIC | Facility: CLINIC | Age: 58
End: 2023-07-20

## 2023-07-21 ENCOUNTER — TELEPHONE (OUTPATIENT)
Dept: FAMILY MEDICINE CLINIC | Facility: CLINIC | Age: 58
End: 2023-07-21

## 2023-07-21 NOTE — TELEPHONE ENCOUNTER
See encounter 7-20-23 received fax that prior auth was approved for zolpidem. Patient notified and he will contact pharmacy.

## 2023-07-21 NOTE — TELEPHONE ENCOUNTER
PLEASE CALL PT- HE NEEDS A PRE-AUTH FOR MEDICATION NOW-  zolpidem 10 MG Oral Tab     [id number] - [place], [place] - [address] AT [address] [phone], [address]

## 2023-08-12 DIAGNOSIS — I10 ESSENTIAL HYPERTENSION: ICD-10-CM

## 2023-08-14 LAB — AMB EXT PSA SCREEN: 1.47 NG/ML

## 2023-08-14 RX ORDER — LOSARTAN POTASSIUM 50 MG/1
TABLET ORAL
Qty: 90 TABLET | Refills: 0 | Status: SHIPPED | OUTPATIENT
Start: 2023-08-14

## 2023-08-14 NOTE — TELEPHONE ENCOUNTER
Losartan 50 MG oral tab      Hypertension Medications Protocol [id number]/12/2023 08:55 PM   Protocol Details CMP or BMP in past 12 months    Last serum creatinine< 2.0    Appointment in past 6 or next 3 months        Last office visit:  10/5/22    No future appointments.   Last filled:  2/6/23  #90 with 1 refill   Last labs:  10/5/22  Crea:  0.83

## 2023-09-11 ENCOUNTER — MED REC SCAN ONLY (OUTPATIENT)
Dept: FAMILY MEDICINE CLINIC | Facility: CLINIC | Age: 58
End: 2023-09-11

## 2023-10-03 NOTE — TELEPHONE ENCOUNTER
Call [facility] Pred[facility]one [facility]:  I discussed with the patient the risks of prolonged use of prednisone including but not limited to weight gain, insomnia, osteoporosis, mood changes, diabetes, susceptibility to infection, glaucoma and high blood pressure.  In cases where prednisone use is prolonged, patients should be monitored with blood pressure checks, serum glucose levels and an eye exam.  Additionally, the patient may need to be placed on GI prophylaxis, PCP prophylaxis, and calcium and vitamin D supplementation and/or a bisphosphonate.  The patient verbalized understanding of the proper use and the possible adverse effects of prednisone.  All of the patient's questions and concerns were addressed.

## 2023-11-14 DIAGNOSIS — I10 ESSENTIAL HYPERTENSION: ICD-10-CM

## 2023-11-15 RX ORDER — LOSARTAN POTASSIUM 50 MG/1
TABLET ORAL
Qty: 30 TABLET | Refills: 0 | Status: SHIPPED | OUTPATIENT
Start: 2023-11-15

## 2023-11-17 DIAGNOSIS — G47.26 SHIFT WORK SLEEP DISORDER: ICD-10-CM

## 2023-11-18 RX ORDER — ZOLPIDEM TARTRATE 10 MG/1
10 TABLET ORAL NIGHTLY
Qty: 30 TABLET | Refills: 3 | Status: SHIPPED | OUTPATIENT
Start: 2023-11-18

## 2023-12-12 ENCOUNTER — LABORATORY ENCOUNTER (OUTPATIENT)
Dept: LAB | Age: 58
End: 2023-12-12
Attending: FAMILY MEDICINE
Payer: COMMERCIAL

## 2023-12-12 ENCOUNTER — OFFICE VISIT (OUTPATIENT)
Dept: FAMILY MEDICINE CLINIC | Facility: CLINIC | Age: 58
End: 2023-12-12
Payer: COMMERCIAL

## 2023-12-12 VITALS
SYSTOLIC BLOOD PRESSURE: 110 MMHG | WEIGHT: 241.38 LBS | TEMPERATURE: 97 F | BODY MASS INDEX: 31.99 KG/M2 | OXYGEN SATURATION: 98 % | HEIGHT: 73 IN | RESPIRATION RATE: 12 BRPM | DIASTOLIC BLOOD PRESSURE: 72 MMHG | HEART RATE: 89 BPM

## 2023-12-12 DIAGNOSIS — Z00.00 LABORATORY EXAMINATION ORDERED AS PART OF A ROUTINE GENERAL MEDICAL EXAMINATION: ICD-10-CM

## 2023-12-12 DIAGNOSIS — Z13.0 SCREENING, ANEMIA, DEFICIENCY, IRON: ICD-10-CM

## 2023-12-12 DIAGNOSIS — Z13.220 SCREENING, LIPID: ICD-10-CM

## 2023-12-12 DIAGNOSIS — Z13.29 SCREENING FOR THYROID DISORDER: ICD-10-CM

## 2023-12-12 DIAGNOSIS — I10 ESSENTIAL HYPERTENSION: ICD-10-CM

## 2023-12-12 DIAGNOSIS — R73.9 HYPERGLYCEMIA: ICD-10-CM

## 2023-12-12 DIAGNOSIS — E66.9 OBESITY (BMI 30.0-34.9): ICD-10-CM

## 2023-12-12 DIAGNOSIS — Z23 NEED FOR VACCINATION: ICD-10-CM

## 2023-12-12 DIAGNOSIS — Z11.59 NEED FOR HEPATITIS C SCREENING TEST: ICD-10-CM

## 2023-12-12 DIAGNOSIS — N40.0 BENIGN PROSTATIC HYPERPLASIA WITHOUT LOWER URINARY TRACT SYMPTOMS: Chronic | ICD-10-CM

## 2023-12-12 DIAGNOSIS — Z00.00 WELL ADULT EXAM: Primary | ICD-10-CM

## 2023-12-12 PROBLEM — D12.6 TUBULAR ADENOMA OF COLON: Status: RESOLVED | Noted: 2017-04-04 | Resolved: 2023-12-12

## 2023-12-12 PROBLEM — K57.92 DIVERTICULITIS: Status: RESOLVED | Noted: 2018-11-15 | Resolved: 2023-12-12

## 2023-12-12 PROBLEM — N48.6 PEYRONIE DISEASE: Status: RESOLVED | Noted: 2018-02-12 | Resolved: 2023-12-12

## 2023-12-12 PROCEDURE — 85025 COMPLETE CBC W/AUTO DIFF WBC: CPT

## 2023-12-12 PROCEDURE — 80061 LIPID PANEL: CPT

## 2023-12-12 PROCEDURE — 83036 HEMOGLOBIN GLYCOSYLATED A1C: CPT

## 2023-12-12 PROCEDURE — 80053 COMPREHEN METABOLIC PANEL: CPT

## 2023-12-12 PROCEDURE — 90471 IMMUNIZATION ADMIN: CPT | Performed by: FAMILY MEDICINE

## 2023-12-12 PROCEDURE — 86803 HEPATITIS C AB TEST: CPT

## 2023-12-12 PROCEDURE — 36415 COLL VENOUS BLD VENIPUNCTURE: CPT

## 2023-12-12 PROCEDURE — 90686 IIV4 VACC NO PRSV 0.5 ML IM: CPT | Performed by: FAMILY MEDICINE

## 2023-12-12 PROCEDURE — 3074F SYST BP LT 130 MM HG: CPT | Performed by: FAMILY MEDICINE

## 2023-12-12 PROCEDURE — 3008F BODY MASS INDEX DOCD: CPT | Performed by: FAMILY MEDICINE

## 2023-12-12 PROCEDURE — 3078F DIAST BP <80 MM HG: CPT | Performed by: FAMILY MEDICINE

## 2023-12-12 PROCEDURE — 99396 PREV VISIT EST AGE 40-64: CPT | Performed by: FAMILY MEDICINE

## 2023-12-12 PROCEDURE — 84443 ASSAY THYROID STIM HORMONE: CPT

## 2023-12-12 RX ORDER — LOSARTAN POTASSIUM 50 MG/1
50 TABLET ORAL DAILY
Qty: 90 TABLET | Refills: 3 | Status: SHIPPED | OUTPATIENT
Start: 2023-12-12 | End: 2024-12-06

## 2023-12-13 LAB
ALBUMIN SERPL-MCNC: 4.5 G/DL (ref 3.4–5)
ALBUMIN/GLOB SERPL: 1.5 {RATIO} (ref 1–2)
ALP LIVER SERPL-CCNC: 61 U/L
ALT SERPL-CCNC: 28 U/L
ANION GAP SERPL CALC-SCNC: 5 MMOL/L (ref 0–18)
AST SERPL-CCNC: 26 U/L (ref 15–37)
BASOPHILS # BLD AUTO: 0.03 X10(3) UL (ref 0–0.2)
BASOPHILS NFR BLD AUTO: 0.5 %
BILIRUB SERPL-MCNC: 1 MG/DL (ref 0.1–2)
BUN BLD-MCNC: 9 MG/DL (ref 9–23)
CALCIUM BLD-MCNC: 9.4 MG/DL (ref 8.5–10.1)
CHLORIDE SERPL-SCNC: 106 MMOL/L (ref 98–112)
CHOLEST SERPL-MCNC: 199 MG/DL (ref ?–200)
CO2 SERPL-SCNC: 27 MMOL/L (ref 21–32)
CREAT BLD-MCNC: 1.03 MG/DL
EGFRCR SERPLBLD CKD-EPI 2021: 85 ML/MIN/1.73M2 (ref 60–?)
EOSINOPHIL # BLD AUTO: 0.15 X10(3) UL (ref 0–0.7)
EOSINOPHIL NFR BLD AUTO: 2.4 %
ERYTHROCYTE [DISTWIDTH] IN BLOOD BY AUTOMATED COUNT: 13 %
FASTING PATIENT LIPID ANSWER: NO
FASTING STATUS PATIENT QL REPORTED: NO
GLOBULIN PLAS-MCNC: 3 G/DL (ref 2.8–4.4)
GLUCOSE BLD-MCNC: 110 MG/DL (ref 70–99)
HCT VFR BLD AUTO: 40.7 %
HCV AB SERPL QL IA: NONREACTIVE
HDLC SERPL-MCNC: 62 MG/DL (ref 40–59)
HGB BLD-MCNC: 13.8 G/DL
IMM GRANULOCYTES # BLD AUTO: 0.01 X10(3) UL (ref 0–1)
IMM GRANULOCYTES NFR BLD: 0.2 %
LDLC SERPL CALC-MCNC: 122 MG/DL (ref ?–100)
LYMPHOCYTES # BLD AUTO: 1.27 X10(3) UL (ref 1–4)
LYMPHOCYTES NFR BLD AUTO: 20.6 %
MCH RBC QN AUTO: 28.1 PG (ref 26–34)
MCHC RBC AUTO-ENTMCNC: 33.9 G/DL (ref 31–37)
MCV RBC AUTO: 82.9 FL
MONOCYTES # BLD AUTO: 0.61 X10(3) UL (ref 0.1–1)
MONOCYTES NFR BLD AUTO: 9.9 %
NEUTROPHILS # BLD AUTO: 4.1 X10 (3) UL (ref 1.5–7.7)
NEUTROPHILS # BLD AUTO: 4.1 X10(3) UL (ref 1.5–7.7)
NEUTROPHILS NFR BLD AUTO: 66.4 %
NONHDLC SERPL-MCNC: 137 MG/DL (ref ?–130)
OSMOLALITY SERPL CALC.SUM OF ELEC: 285 MOSM/KG (ref 275–295)
PLATELET # BLD AUTO: 226 10(3)UL (ref 150–450)
POTASSIUM SERPL-SCNC: 3.9 MMOL/L (ref 3.5–5.1)
PROT SERPL-MCNC: 7.5 G/DL (ref 6.4–8.2)
RBC # BLD AUTO: 4.91 X10(6)UL
SODIUM SERPL-SCNC: 138 MMOL/L (ref 136–145)
TRIGL SERPL-MCNC: 82 MG/DL (ref 30–149)
TSI SER-ACNC: 1.29 MIU/ML (ref 0.36–3.74)
VLDLC SERPL CALC-MCNC: 14 MG/DL (ref 0–30)
WBC # BLD AUTO: 6.2 X10(3) UL (ref 4–11)

## 2023-12-15 DIAGNOSIS — R73.9 HYPERGLYCEMIA: Primary | ICD-10-CM

## 2023-12-15 LAB
EST. AVERAGE GLUCOSE BLD GHB EST-MCNC: 111 MG/DL (ref 68–126)
HBA1C MFR BLD: 5.5 % (ref ?–5.7)

## 2023-12-16 DIAGNOSIS — Z00.00 WELL ADULT EXAM: ICD-10-CM

## 2023-12-16 DIAGNOSIS — I10 ESSENTIAL HYPERTENSION: ICD-10-CM

## 2023-12-16 DIAGNOSIS — Z13.0 SCREENING, ANEMIA, DEFICIENCY, IRON: ICD-10-CM

## 2023-12-16 DIAGNOSIS — Z13.220 SCREENING, LIPID: Primary | ICD-10-CM

## 2024-02-02 ENCOUNTER — OFFICE VISIT (OUTPATIENT)
Dept: FAMILY MEDICINE CLINIC | Facility: CLINIC | Age: 59
End: 2024-02-02
Payer: COMMERCIAL

## 2024-02-02 VITALS
DIASTOLIC BLOOD PRESSURE: 76 MMHG | TEMPERATURE: 98 F | BODY MASS INDEX: 34.58 KG/M2 | HEIGHT: 71 IN | WEIGHT: 247 LBS | SYSTOLIC BLOOD PRESSURE: 124 MMHG | RESPIRATION RATE: 12 BRPM | HEART RATE: 60 BPM

## 2024-02-02 DIAGNOSIS — K30 UPSET STOMACH: Primary | ICD-10-CM

## 2024-02-02 PROCEDURE — 99213 OFFICE O/P EST LOW 20 MIN: CPT

## 2024-02-02 NOTE — PROGRESS NOTES
HPI:   Jonel is a 58 yr. Old male with a history of diverticulitis here today with generalized abdominal discomfort that started last night after he ate two tamales from [employer].  Patient reports feeling somewhat better by this morning.  He denies LLQ tenderness, discomfort, change in stools.  Reports he is unsure if this is the beginning of a diverticulitis flare or if it is an acute gastrointestinal illness as his stomach does feel overall uneasy today and he does not have an appetite.  Denies fever, chills, bloating constipation, diarrhea, vomit.  Needs work note.         Current Outpatient Medications   Medication Sig Dispense Refill    losartan 50 MG Oral Tab Take 1 tablet (50 mg total) by mouth daily. 90 tablet 3    zolpidem 10 MG Oral Tab Take 1 tablet (10 mg total) by mouth nightly. 30 tablet 3    hydrocortisone 2.5 % External Cream Apply bid rectally 30 g 0    famotidine 40 MG Oral Tab Take 1 tablet (40 mg total) by mouth 2 (two) times daily.      Loratadine-Pseudoephedrine (CLARITIN-D 24 HOUR OR) Take by mouth as needed.      Multiple Vitamin (MULTIVITAMIN TAB/CAP) Take 1 tablet by mouth daily.      tamsulosin HCl 0.4 MG Oral Cap Take 1 capsule (0.4 mg total) by mouth daily.        Past Medical History:   Diagnosis Date    BPH (benign prostatic hyperplasia)     Diverticulitis     Diverticulosis of intestine without bleeding     Essential hypertension     History of total knee arthroplasty, left     Osteoarthritis     knees    Peyronie disease     Overview:   Pt reports in the past year he notes a lump a the base of the penis that is starting to restrict erections.  He denies pain with erections or sexual activity.  He does reports avoiding episodes where his partner would be on top.     Last Assessment & Plan:   Pathophysiology and diff dx reviewed wtith pt.  Management options reviewed wtih pt.  Recc avoiding further injury and not have h    Tubular adenoma of colon     Removed on colonoscopy dr parekr  4/3/17    Visual impairment     contacts and glasses      Past Surgical History:   Procedure Laterality Date    COLECTOMY      COLONOSCOPY  2017    HERNIA SURGERY  2021    KNEE REPLACEMENT SURGERY      Aug 2018,Left knee    OTHER SURGICAL HISTORY      LEFT KNEE ACL RECONSTRUCTION    SINUS SURGERY        deviated septum and sinus surgery    TONSILLECTOMY        Family History   Problem Relation Age of Onset    Hypertension Father     Thyroid disease Mother     Cancer Paternal Grandmother         breast      Social History     Socioeconomic History    Marital status:    Tobacco Use    Smoking status: Former     Years: 30     Types: Cigarettes     Quit date: 2015     Years since quittin.9    Smokeless tobacco: Never   Vaping Use    Vaping Use: Never used   Substance and Sexual Activity    Alcohol use: Yes     Alcohol/week: 5.0 standard drinks of alcohol     Types: 5 Cans of beer per week     Comment: \"weekends\"    Drug use: No    Sexual activity: Yes     Partners: Female   Other Topics Concern    Caffeine Concern Yes     Comment: diet daily    Exercise No    Seat Belt Yes         REVIEW OF SYSTEMS:   Review of Systems   Constitutional:  Positive for appetite change. Negative for chills, fatigue and fever.   HENT: Negative.     Respiratory: Negative.     Cardiovascular: Negative.    Gastrointestinal:  Positive for nausea. Negative for abdominal pain, blood in stool, constipation, diarrhea and vomiting.   Neurological:  Negative for dizziness, light-headedness and headaches.       EXAM:   /76 (BP Location: Right arm, Patient Position: Sitting, Cuff Size: large)   Pulse 60   Temp 97.9 °F (36.6 °C) (Temporal)   Resp 12   Ht 5' 11\" (1.803 m)   Wt 247 lb (112 kg)   BMI 34.45 kg/m²   Physical Exam  Vitals and nursing note reviewed.   Constitutional:       General: He is not in acute distress.     Appearance: Normal appearance. He is not ill-appearing.   HENT:      Head: Atraumatic.       Mouth/Throat:      Mouth: Mucous membranes are moist.   Eyes:      Conjunctiva/sclera: Conjunctivae normal.   Cardiovascular:      Rate and Rhythm: Normal rate and regular rhythm.      Pulses: Normal pulses.      Heart sounds: Normal heart sounds.   Pulmonary:      Effort: Pulmonary effort is normal.      Breath sounds: Normal breath sounds.   Abdominal:      General: Abdomen is flat. Bowel sounds are normal.      Palpations: Abdomen is soft.      Tenderness: There is no abdominal tenderness. There is no guarding or rebound.   Skin:     General: Skin is warm and dry.   Neurological:      General: No focal deficit present.      Mental Status: He is alert.         ASSESSMENT AND PLAN:   Diagnoses and all orders for this visit:    Upset stomach     -Discussed recommend gut rest, BRAT diet.  Printed instructions provided.  -Return for worsening, call with questions or problems.  -Work note provided.  -Patient verbalized understanding and in agreement with treatment plan.    20 minutes were spent with this patient on assessment, education, and discussion of treatment plan.    KESHAV Winchester

## 2024-03-16 DIAGNOSIS — G47.26 SHIFT WORK SLEEP DISORDER: ICD-10-CM

## 2024-03-16 NOTE — TELEPHONE ENCOUNTER
Last office visit: 12/12/23  Last filled: 11/18/23 #30 with 3 RF   Last labs: 12/12/23     No future appointments.    Protocol:  Controlled Substance Medication [id number]/16/2024 10:06 AM    This medication is a controlled substance - forward to provider to refill

## 2024-03-18 RX ORDER — ZOLPIDEM TARTRATE 10 MG/1
10 TABLET ORAL NIGHTLY
Qty: 30 TABLET | Refills: 2 | Status: SHIPPED | OUTPATIENT
Start: 2024-03-18

## 2024-06-15 DIAGNOSIS — G47.26 SHIFT WORK SLEEP DISORDER: ICD-10-CM

## 2024-06-15 RX ORDER — ZOLPIDEM TARTRATE 10 MG/1
10 TABLET ORAL NIGHTLY
Qty: 30 TABLET | Refills: 0 | Status: SHIPPED | OUTPATIENT
Start: 2024-06-15

## 2024-06-15 NOTE — TELEPHONE ENCOUNTER
Faxed request for refill on zolpidem 10 mg    LOV  2-2-24  APRN  12-12-23 Dr Pizano    LAST LAB      LAST RX  3-18-24  #30  RF 2    Next OV  No future appointments.

## 2024-07-16 DIAGNOSIS — G47.26 SHIFT WORK SLEEP DISORDER: ICD-10-CM

## 2024-07-16 RX ORDER — ZOLPIDEM TARTRATE 10 MG/1
10 TABLET ORAL NIGHTLY
Qty: 30 TABLET | Refills: 3 | Status: SHIPPED | OUTPATIENT
Start: 2024-07-16

## 2024-07-16 NOTE — TELEPHONE ENCOUNTER
Last office visit:12/12/23   .Last filled: 6/15/24 #30 with 0 RF  Last labs:12/12/23     No future appointments.    Protocol  Controlled Substance Medication [id number]/16/2024 08:25 AM    This medication is a controlled substance - forward to provider to refill

## 2024-07-24 ENCOUNTER — TELEPHONE (OUTPATIENT)
Dept: FAMILY MEDICINE CLINIC | Facility: CLINIC | Age: 59
End: 2024-07-24

## 2024-07-24 NOTE — TELEPHONE ENCOUNTER
Spoke with patient was given clindamycin antibiotic from dentist.   Reviewed with patient that pharmacy does have his allergies on file and is aware  would not dispense if there were any interaction.

## 2024-07-24 NOTE — TELEPHONE ENCOUNTER
Patient asking what medications he is allergic to because he got antibiotic from his dentist and he wants to make sure he is not allergic.

## 2024-10-01 NOTE — TELEPHONE ENCOUNTER
POC UA reveals blood 2+, protein 1+, nitrates positive, leuks 2+    Symptoms of burning during urination, pressure, and urgency suggest a urinary tract infection.     A prescription for Macrobid, an antibiotic, has been sent to the [facility] on [address].     A urine culture will be conducted to confirm the diagnosis. If the culture indicates that the current antibiotic is ineffective, an alternative medication will be prescribed.     Additionally, a medication for bladder spasms, Pyridium, has been prescribed, which may cause her urine to turn orange.     She has been advised to increase water intake, consume cranberry juice, and consider over-the-counter D-mannose. Proper hygiene practices, such as washing from front to back and not holding urine for extended periods, were also recommended.   Patient called requesting refill on his antibiotics. Patient had CT done today and drain left in place. Patient only has a few days left of antibiotic. He has a follow up appointment with Dr. Tyler Sandoval on 11/5/18. Notified Clive ROOT.  Refilled antibio

## 2024-10-07 LAB — AMB EXT PSA SCREEN: 1.45 NG/ML

## 2024-11-10 DIAGNOSIS — G47.26 SHIFT WORK SLEEP DISORDER: ICD-10-CM

## 2024-11-11 RX ORDER — ZOLPIDEM TARTRATE 10 MG/1
10 TABLET ORAL NIGHTLY
Qty: 30 TABLET | Refills: 1 | Status: SHIPPED | OUTPATIENT
Start: 2024-11-11

## 2024-11-11 NOTE — TELEPHONE ENCOUNTER
No protocol    OV 12/12/23  REFILL 07/16/24 #30 +3 RF    [facility] future appointments[facility]

## 2024-12-03 DIAGNOSIS — I10 ESSENTIAL HYPERTENSION: ICD-10-CM

## 2024-12-04 RX ORDER — LOSARTAN POTASSIUM 50 MG/1
50 TABLET ORAL DAILY
Qty: 90 TABLET | Refills: 0 | Status: SHIPPED | OUTPATIENT
Start: 2024-12-04

## 2024-12-04 NOTE — TELEPHONE ENCOUNTER
OV 12/12/23  LABS 12/13/23    REFILL 12/12/23 #90 +3 RF    Future Appointments   Date Time Provider Department Center   12/16/2024  9:40 AM Charbel Pizano MD [facility] [place]       BP Readings from Last 3 Encounters:   02/02/24 124/76   12/12/23 110/72   06/12/23 136/88

## 2024-12-20 ENCOUNTER — LABORATORY ENCOUNTER (OUTPATIENT)
Dept: LAB | Age: 59
End: 2024-12-20
Attending: FAMILY MEDICINE
Payer: COMMERCIAL

## 2024-12-20 ENCOUNTER — OFFICE VISIT (OUTPATIENT)
Dept: FAMILY MEDICINE CLINIC | Facility: CLINIC | Age: 59
End: 2024-12-20
Payer: COMMERCIAL

## 2024-12-20 VITALS
SYSTOLIC BLOOD PRESSURE: 118 MMHG | BODY MASS INDEX: 31.83 KG/M2 | RESPIRATION RATE: 12 BRPM | DIASTOLIC BLOOD PRESSURE: 60 MMHG | HEIGHT: 72 IN | TEMPERATURE: 97 F | OXYGEN SATURATION: 96 % | HEART RATE: 73 BPM | WEIGHT: 235 LBS

## 2024-12-20 DIAGNOSIS — Z00.00 LABORATORY EXAMINATION ORDERED AS PART OF A ROUTINE GENERAL MEDICAL EXAMINATION: ICD-10-CM

## 2024-12-20 DIAGNOSIS — Z13.0 SCREENING FOR IRON DEFICIENCY ANEMIA: ICD-10-CM

## 2024-12-20 DIAGNOSIS — Z13.1 SCREENING FOR DIABETES MELLITUS: ICD-10-CM

## 2024-12-20 DIAGNOSIS — Z13.29 SCREENING FOR THYROID DISORDER: ICD-10-CM

## 2024-12-20 DIAGNOSIS — Z13.6 SCREENING FOR CARDIOVASCULAR CONDITION: ICD-10-CM

## 2024-12-20 DIAGNOSIS — I10 ESSENTIAL HYPERTENSION: ICD-10-CM

## 2024-12-20 DIAGNOSIS — R73.9 HYPERGLYCEMIA: ICD-10-CM

## 2024-12-20 DIAGNOSIS — Z00.00 WELL ADULT EXAM: Primary | ICD-10-CM

## 2024-12-20 LAB
ALBUMIN SERPL-MCNC: 4.5 G/DL (ref 3.2–4.8)
ALBUMIN/GLOB SERPL: 1.8 {RATIO} (ref 1–2)
ALP LIVER SERPL-CCNC: 65 U/L
ALT SERPL-CCNC: 16 U/L
ANION GAP SERPL CALC-SCNC: 10 MMOL/L (ref 0–18)
AST SERPL-CCNC: 22 U/L (ref ?–34)
BASOPHILS # BLD AUTO: 0.05 X10(3) UL (ref 0–0.2)
BASOPHILS NFR BLD AUTO: 1 %
BILIRUB SERPL-MCNC: 0.6 MG/DL (ref 0.3–1.2)
BUN BLD-MCNC: 11 MG/DL (ref 9–23)
CALCIUM BLD-MCNC: 9.7 MG/DL (ref 8.7–10.4)
CHLORIDE SERPL-SCNC: 105 MMOL/L (ref 98–112)
CHOLEST SERPL-MCNC: 165 MG/DL (ref ?–200)
CO2 SERPL-SCNC: 26 MMOL/L (ref 21–32)
CREAT BLD-MCNC: 0.85 MG/DL
EGFRCR SERPLBLD CKD-EPI 2021: 101 ML/MIN/1.73M2 (ref 60–?)
EOSINOPHIL # BLD AUTO: 0.15 X10(3) UL (ref 0–0.7)
EOSINOPHIL NFR BLD AUTO: 3.1 %
ERYTHROCYTE [DISTWIDTH] IN BLOOD BY AUTOMATED COUNT: 13 %
EST. AVERAGE GLUCOSE BLD GHB EST-MCNC: 114 MG/DL (ref 68–126)
FASTING PATIENT LIPID ANSWER: YES
FASTING STATUS PATIENT QL REPORTED: YES
GLOBULIN PLAS-MCNC: 2.5 G/DL (ref 2–3.5)
GLUCOSE BLD-MCNC: 93 MG/DL (ref 70–99)
HBA1C MFR BLD: 5.6 % (ref ?–5.7)
HCT VFR BLD AUTO: 40.3 %
HDLC SERPL-MCNC: 52 MG/DL (ref 40–59)
HGB BLD-MCNC: 13.4 G/DL
IMM GRANULOCYTES # BLD AUTO: 0.01 X10(3) UL (ref 0–1)
IMM GRANULOCYTES NFR BLD: 0.2 %
LDLC SERPL CALC-MCNC: 100 MG/DL (ref ?–100)
LYMPHOCYTES # BLD AUTO: 1.27 X10(3) UL (ref 1–4)
LYMPHOCYTES NFR BLD AUTO: 26 %
MCH RBC QN AUTO: 27.1 PG (ref 26–34)
MCHC RBC AUTO-ENTMCNC: 33.3 G/DL (ref 31–37)
MCV RBC AUTO: 81.4 FL
MONOCYTES # BLD AUTO: 0.51 X10(3) UL (ref 0.1–1)
MONOCYTES NFR BLD AUTO: 10.4 %
NEUTROPHILS # BLD AUTO: 2.9 X10 (3) UL (ref 1.5–7.7)
NEUTROPHILS # BLD AUTO: 2.9 X10(3) UL (ref 1.5–7.7)
NEUTROPHILS NFR BLD AUTO: 59.3 %
NONHDLC SERPL-MCNC: 113 MG/DL (ref ?–130)
OSMOLALITY SERPL CALC.SUM OF ELEC: 291 MOSM/KG (ref 275–295)
PLATELET # BLD AUTO: 168 10(3)UL (ref 150–450)
POTASSIUM SERPL-SCNC: 4.2 MMOL/L (ref 3.5–5.1)
PROT SERPL-MCNC: 7 G/DL (ref 5.7–8.2)
RBC # BLD AUTO: 4.95 X10(6)UL
SODIUM SERPL-SCNC: 141 MMOL/L (ref 136–145)
TRIGL SERPL-MCNC: 66 MG/DL (ref 30–149)
TSI SER-ACNC: 2.09 UIU/ML (ref 0.55–4.78)
VLDLC SERPL CALC-MCNC: 11 MG/DL (ref 0–30)
WBC # BLD AUTO: 4.9 X10(3) UL (ref 4–11)

## 2024-12-20 PROCEDURE — 80053 COMPREHEN METABOLIC PANEL: CPT

## 2024-12-20 PROCEDURE — 99396 PREV VISIT EST AGE 40-64: CPT | Performed by: FAMILY MEDICINE

## 2024-12-20 PROCEDURE — 84443 ASSAY THYROID STIM HORMONE: CPT

## 2024-12-20 PROCEDURE — 36415 COLL VENOUS BLD VENIPUNCTURE: CPT

## 2024-12-20 PROCEDURE — 80061 LIPID PANEL: CPT

## 2024-12-20 PROCEDURE — 83036 HEMOGLOBIN GLYCOSYLATED A1C: CPT

## 2024-12-20 PROCEDURE — 85025 COMPLETE CBC W/AUTO DIFF WBC: CPT

## 2024-12-20 NOTE — PROGRESS NOTES
Sebastian Hernandez is a 59 year old male.    CC:    Chief Complaint   Patient presents with    Physical     Reviewed Preventative/Wellness form with patient.        Subjective:     Chief Complaint Reviewed and Verified  Nursing Notes Reviewed and   Verified  Tobacco Reviewed  Allergies Reviewed  Medications Reviewed    Problem List Reviewed  Medical History Reviewed  Surgical History   Reviewed  Family History Reviewed         Fels well  No complains of  issues  Works part time  No known injury  No chest pain  Sleeps well  No colon issues  Needs fast blood work  Appointment for flu vaccine later in month  PSA was briefly elevated and now normal  Follows with urology  History/Other:   Allergies:  Allergies[1]   Current Meds:  has a current medication list which includes the following prescription(s): losartan, zolpidem, hydrocortisone, famotidine, loratadine-pseudoephedrine, multiple vitamin, and tamsulosin.      History:  Past Medical History:    BPH (benign prostatic hyperplasia)    Diverticulitis    Diverticulosis of intestine without bleeding    Essential hypertension    History of total knee arthroplasty, left    Osteoarthritis    knees    Peyronie disease    Overview:   Pt reports in the past year he notes a lump a the base of the penis that is starting to restrict erections.  He denies pain with erections or sexual activity.  He does reports avoiding episodes where his partner would be on top.     Last Assessment & Plan:   Pathophysiology and diff dx reviewed wtith pt.  Management options reviewed wtih pt.  Recc avoiding further injury and not have h    Tubular adenoma of colon    Removed on colonoscopy dr parker 4/3/17    Visual impairment    contacts and glasses      Past Surgical History:   Procedure Laterality Date    Colectomy      Colonoscopy  04/03/2017    Hernia surgery  08/30/2021    Knee replacement surgery      Aug 2018,Left knee    Other surgical history      LEFT KNEE ACL RECONSTRUCTION     Sinus surgery        deviated septum and sinus surgery    Tonsillectomy        Family History   Problem Relation Age of Onset    Hypertension Father     Thyroid disease Mother     Cancer Paternal Grandmother         breast      Family Status   Relation Status    Fa Alive    Mo Alive    Sarah Beth Alive    Son Alive    PGMA Alive    Bro Alive      Social History     Socioeconomic History    Marital status:    Tobacco Use    Smoking status: Former     Current packs/day: 0.00     Types: Cigarettes     Start date: 1985     Quit date: 2015     Years since quittin.8    Smokeless tobacco: Never   Vaping Use    Vaping status: Never Used   Substance and Sexual Activity    Alcohol use: Yes     Alcohol/week: 5.0 standard drinks of alcohol     Types: 5 Cans of beer per week     Comment: \"weekends\"    Drug use: No    Sexual activity: Yes     Partners: Female   Other Topics Concern    Caffeine Concern Yes     Comment: diet daily    Exercise No    Seat Belt Yes     Social Drivers of Health      [facility]    Social Connections    Received from [facility], [facility]    Housing Stability          Immunization History   Administered Date(s) Administered    Covid-19 Vaccine Moderna 100 mcg/0.5 ml 2021, 2021, 2021    FLULAVAL 6 months & older 0.5 ml Prefilled syringe (41923) 2022    FLUZONE 6 months and older PFS 0.5 ml (08467) 2023    Flublok Quad Influenza Vaccine (63424) 12/15/2021    Flucelvax 0.5 Ml Quad PFS Single Dose 2020    Influenza 2020    TDAP 2010, 2021   Deferred Date(s) Deferred    FLULAVAL 6 months & older 0.5 ml Prefilled syringe (38940) 2018         Wt Readings from Last 6 Encounters:   24 235 lb (106.6 kg)   24 247 lb (112 kg)   23 241 lb 6 oz (109.5 kg)   23 242 lb 6 oz (109.9 kg)   02/10/23 251 lb (113.9 kg)    11/29/22 252 lb (114.3 kg)       BP Readings from Last 3 Encounters:   12/20/24 118/60   02/02/24 124/76   12/12/23 110/72     REVIEW OF SYSTEMS:   GENERAL: feels well otherwise  SKIN: denies any unusual skin lesions  EYES:denies blurred vision or double vision  HEENT: denies nasal congestion, sinus pain or ST  LUNGS: denies shortness of breath with exertion  CARDIOVASCULAR: denies chest pain on exertion  GI: denies abdominal pain,denies heartburn   : denies nocturia or changes in stream  MUSCULOSKELETAL: denies back pain  NEURO: denies headaches  PSYCHE: denies depression or anxiety    Objective:    EXAM:   Blood pressure 118/60, pulse 73, temperature 97.3 °F (36.3 °C), temperature source Temporal, resp. rate 12, height 6' (1.829 m), weight 235 lb (106.6 kg), SpO2 96%.  Body mass index is 31.87 kg/m².      Reviewed by Dr Pizano    GENERAL: well developed, well nourished,in no apparent distress  SKIN: no rashes,no suspicious lesions  HEENT: atraumatic, normocephalic,ears and throat are clear  EYES:PERRLA, EOMI, normal optic disk,conjunctiva are clear  NECK: supple,no adenopathy,no bruits  CHEST: no chest tenderness  LUNGS: clear to auscultation  CARDIO: RRR without murmur  GI: good BS's,no masses, HSM or tenderness  : defer  RECTAL: defer         MUSCULOSKELETAL: back is not tender,FROM of the back  EXTREMITIES: no cyanosis, clubbing or edema  NEURO: Oriented times three,cranial nerves are intact,motor and sensory are grossly intact    Assessment & Plan:       ASSESSMENT:    1. Well adult exam (Primary)  2. Hyperglycemia  -     Comp Metabolic Panel (14); Future; Expected date: 12/20/2024  -     Hemoglobin A1C; Future; Expected date: 12/20/2024  3. Essential hypertension  Overview:  Blood Pressure and Cardiac Medications            losartan 50 MG Oral Tab            Orders:  -     TSH W Reflex To Free T4; Future; Expected date: 12/20/2024  -     CBC With Differential With Platelet; Future; Expected date:  12/20/2024  -     Comp Metabolic Panel (14); Future; Expected date: 12/20/2024  4. Screening for diabetes mellitus  -     Comp Metabolic Panel (14); Future; Expected date: 12/20/2024  -     Hemoglobin A1C; Future; Expected date: 12/20/2024  5. Screening for iron deficiency anemia  -     CBC With Differential With Platelet; Future; Expected date: 12/20/2024  6. Screening for thyroid disorder  -     TSH W Reflex To Free T4; Future; Expected date: 12/20/2024  7. Screening for cardiovascular condition  -     Lipid Panel; Future; Expected date: 12/20/2024  8. Laboratory examination ordered as part of a routine general medical examination  -     TSH W Reflex To Free T4; Future; Expected date: 12/20/2024  -     Lipid Panel; Future; Expected date: 12/20/2024  -     CBC With Differential With Platelet; Future; Expected date: 12/20/2024  -     Comp Metabolic Panel (14); Future; Expected date: 12/20/2024  -     Hemoglobin A1C; Future; Expected date: 12/20/2024            Meds & Refills for this Visit:  Requested Prescriptions      No prescriptions requested or ordered in this encounter                      [1]   Allergies  Allergen Reactions    Augmentin [Amoxicillin-Pot Clavulanate] HIVES    Erythromycin DIARRHEA     Other reaction(s): Unknown    Penicillins HIVES    Sulfa Antibiotics ANAPHYLAXIS     Throat felt like it was swollen and flushed

## 2025-01-02 ENCOUNTER — OFFICE VISIT (OUTPATIENT)
Dept: FAMILY MEDICINE CLINIC | Facility: CLINIC | Age: 60
End: 2025-01-02
Payer: COMMERCIAL

## 2025-01-02 VITALS
RESPIRATION RATE: 20 BRPM | HEIGHT: 72 IN | SYSTOLIC BLOOD PRESSURE: 124 MMHG | OXYGEN SATURATION: 94 % | WEIGHT: 234.38 LBS | BODY MASS INDEX: 31.74 KG/M2 | TEMPERATURE: 98 F | DIASTOLIC BLOOD PRESSURE: 70 MMHG | HEART RATE: 85 BPM

## 2025-01-02 DIAGNOSIS — J32.9 SINOBRONCHITIS: Primary | ICD-10-CM

## 2025-01-02 DIAGNOSIS — J40 SINOBRONCHITIS: Primary | ICD-10-CM

## 2025-01-02 PROCEDURE — 99213 OFFICE O/P EST LOW 20 MIN: CPT

## 2025-01-02 RX ORDER — ALBUTEROL SULFATE 0.83 MG/ML
2.5 SOLUTION RESPIRATORY (INHALATION) EVERY 4 HOURS PRN
Qty: 3 ML | Refills: 1 | Status: SHIPPED | OUTPATIENT
Start: 2025-01-02

## 2025-01-02 RX ORDER — LEVOFLOXACIN 750 MG/1
750 TABLET, FILM COATED ORAL DAILY
Qty: 10 TABLET | Refills: 0 | Status: SHIPPED | OUTPATIENT
Start: 2025-01-02 | End: 2025-01-12

## 2025-01-02 RX ORDER — PREDNISONE 20 MG/1
40 TABLET ORAL DAILY
Qty: 10 TABLET | Refills: 0 | Status: SHIPPED | OUTPATIENT
Start: 2025-01-02 | End: 2025-01-07

## 2025-01-02 RX ORDER — NEBULIZER ACCESSORIES
KIT MISCELLANEOUS
Qty: 1 EACH | Refills: 0 | Status: SHIPPED | OUTPATIENT
Start: 2025-01-02

## 2025-01-02 NOTE — PROGRESS NOTES
HPI:   Jonel is a 59 yr. Old male here today for a cough x 1 week. Has been taking an over the counter mucinex product.  Patient presents today reports wet cough, head congestion, and fatigue.  He feels the cough is worsening. Denies fever, chills, body aches, shortness of breath.         Current Outpatient Medications   Medication Sig Dispense Refill    predniSONE 20 MG Oral Tab Take 2 tablets (40 mg total) by mouth daily for 5 days. 10 tablet 0    levoFLOXacin 750 MG Oral Tab Take 1 tablet (750 mg total) by mouth daily for 10 days. 10 tablet 0    Respiratory Therapy Supplies (NEBULIZER/TUBING/MOUTHPIECE) Does not apply Kit Please provide nebulizer, tubing, and mouthpiece. 1 each 0    albuterol (2.5 MG/3ML) 0.083% Inhalation Nebu Soln Take 3 mL (2.5 mg total) by nebulization every 4 (four) hours as needed for Wheezing or Shortness of Breath (cough). 3 mL 1    losartan 50 MG Oral Tab TAKE 1 TABLET(50 MG) BY MOUTH DAILY 90 tablet 0    ZOLPIDEM 10 MG Oral Tab TAKE 1 TABLET(10 MG) BY MOUTH EVERY NIGHT 30 tablet 1    hydrocortisone 2.5 % External Cream Apply bid rectally 30 g 0    Loratadine-Pseudoephedrine (CLARITIN-D 24 HOUR OR) Take by mouth as needed.      tamsulosin HCl 0.4 MG Oral Cap Take 1 capsule (0.4 mg total) by mouth daily.      famotidine 40 MG Oral Tab Take 1 tablet (40 mg total) by mouth 2 (two) times daily. (Patient not taking: Reported on 1/2/2025)      Multiple Vitamin (MULTIVITAMIN TAB/CAP) Take 1 tablet by mouth daily. (Patient not taking: Reported on 1/2/2025)        Past Medical History:    BPH (benign prostatic hyperplasia)    Diverticulitis    Diverticulosis of intestine without bleeding    Essential hypertension    History of total knee arthroplasty, left    Osteoarthritis    knees    Peyronie disease    Overview:   Pt reports in the past year he notes a lump a the base of the penis that is starting to restrict erections.  He denies pain with erections or sexual activity.  He does reports  avoiding episodes where his partner would be on top.     Last Assessment & Plan:   Pathophysiology and diff dx reviewed wtith pt.  Management options reviewed wtih pt.  Recc avoiding further injury and not have h    Tubular adenoma of colon    Removed on colonoscopy dr parker 4/3/17    Visual impairment    contacts and glasses      Past Surgical History:   Procedure Laterality Date    Colectomy      Colonoscopy  2017    Hernia surgery  2021    Knee replacement surgery      Aug 2018,Left knee    Other surgical history      LEFT KNEE ACL RECONSTRUCTION    Sinus surgery        deviated septum and sinus surgery    Tonsillectomy        Family History   Problem Relation Age of Onset    Hypertension Father     Thyroid disease Mother     Cancer Paternal Grandmother         breast      Social History     Socioeconomic History    Marital status:    Tobacco Use    Smoking status: Former     Current packs/day: 0.00     Types: Cigarettes     Start date: 1985     Quit date: 2015     Years since quittin.8    Smokeless tobacco: Never   Vaping Use    Vaping status: Never Used   Substance and Sexual Activity    Alcohol use: Yes     Alcohol/week: 5.0 standard drinks of alcohol     Types: 5 Cans of beer per week     Comment: \"weekends\"    Drug use: No    Sexual activity: Yes     Partners: Female   Other Topics Concern    Caffeine Concern Yes     Comment: diet daily    Exercise No    Seat Belt Yes     Social Drivers of Health      [facility]    Social Connections    [facility] from [facility]    Housing Stability         REVIEW OF SYSTEMS:   Review of Systems   Constitutional:  Positive for fatigue. Negative for chills and fever.   HENT:  Positive for congestion, rhinorrhea and sinus pressure. Negative for ear discharge, ear pain, sore throat and trouble swallowing.    Eyes: Negative.     Respiratory:  Positive for cough and chest tightness. Negative for shortness of breath.    Cardiovascular: Negative.    Gastrointestinal: Negative.    Neurological:  Negative for dizziness, light-headedness and headaches.       EXAM:   /70 (BP Location: Left arm, Patient Position: Sitting, Cuff Size: large)   Pulse 85   Temp 98.2 °F (36.8 °C) (Temporal)   Resp 20   Ht 6' (1.829 m)   Wt 234 lb 6.4 oz (106.3 kg)   SpO2 94%   BMI 31.79 kg/m²   Physical Exam  Vitals and nursing note reviewed.   Constitutional:       General: He is not in acute distress.     Appearance: Normal appearance. He is not toxic-appearing.   HENT:      Head: Normocephalic.      Right Ear: Tympanic membrane, ear canal and external ear normal.      Left Ear: Tympanic membrane, ear canal and external ear normal.      Nose: Congestion and rhinorrhea present.      Right Turbinates: Swollen.      Left Turbinates: Swollen.      Mouth/Throat:      Mouth: Mucous membranes are moist.      Pharynx: Posterior oropharyngeal erythema present.   Eyes:      Conjunctiva/sclera: Conjunctivae normal.   Cardiovascular:      Rate and Rhythm: Normal rate and regular rhythm.      Pulses: Normal pulses.      Heart sounds: Normal heart sounds.   Pulmonary:      Effort: Pulmonary effort is normal.      Breath sounds: Examination of the left-upper field reveals rhonchi. Examination of the left-middle field reveals rhonchi. Wheezing and rhonchi present.      Comments: Course bronchial cough  Musculoskeletal:      Cervical back: Neck supple.   Lymphadenopathy:      Cervical: No cervical adenopathy.   Skin:     General: Skin is warm and dry.   Neurological:      Mental Status: He is alert and oriented to person, place, and time.   Psychiatric:         Behavior: Behavior normal.         ASSESSMENT AND PLAN:   Diagnoses and all orders for this visit:    Sinobronchitis  -     predniSONE 20 MG Oral Tab; Take 2 tablets (40 mg total) by mouth daily for 5 days.  -      levoFLOXacin 750 MG Oral Tab; Take 1 tablet (750 mg total) by mouth daily for 10 days.  -     Respiratory Therapy Supplies (NEBULIZER/TUBING/MOUTHPIECE) Does not apply Kit; Please provide nebulizer, tubing, and mouthpiece.  -     albuterol (2.5 MG/3ML) 0.083% Inhalation Nebu Soln; Take 3 mL (2.5 mg total) by nebulization every 4 (four) hours as needed for Wheezing or Shortness of Breath (cough).     -Medications sent to pharmacy.  Recommend continue over the counter treatment of symptoms. Signs and symptoms of worsening condition discussed. Chest xray deferred at current time.   -Return for persistent or worsening symptoms, call with questions.  -Patient verbalized understanding and in agreement with plan.    KESHAV Winchester

## 2025-01-06 ENCOUNTER — HOSPITAL ENCOUNTER (OUTPATIENT)
Dept: GENERAL RADIOLOGY | Age: 60
Discharge: HOME | End: 2025-01-06
Payer: COMMERCIAL

## 2025-01-06 DIAGNOSIS — R05.1 ACUTE COUGH: ICD-10-CM

## 2025-01-06 DIAGNOSIS — R05.1 ACUTE COUGH: Primary | ICD-10-CM

## 2025-01-06 DIAGNOSIS — R06.2 WHEEZE: ICD-10-CM

## 2025-01-06 DIAGNOSIS — G47.26 SHIFT WORK SLEEP DISORDER: ICD-10-CM

## 2025-01-06 PROCEDURE — 71046 X-RAY EXAM CHEST 2 VIEWS: CPT

## 2025-01-06 RX ORDER — ALBUTEROL SULFATE 90 UG/1
2 INHALANT RESPIRATORY (INHALATION) EVERY 6 HOURS PRN
Qty: 8.5 G | Refills: 0 | Status: SHIPPED | OUTPATIENT
Start: 2025-01-06

## 2025-01-06 RX ORDER — ZOLPIDEM TARTRATE 10 MG/1
10 TABLET ORAL NIGHTLY
Qty: 30 TABLET | Refills: 1 | Status: SHIPPED | OUTPATIENT
Start: 2025-01-06

## 2025-01-06 NOTE — TELEPHONE ENCOUNTER
Wife calling stating patient was told if he was not better he should come in and get a chest xray. Patient would like to schedule a chest xray in the [place] office.

## 2025-01-06 NOTE — TELEPHONE ENCOUNTER
Shannon (wife) notified that order is in place for chest x-ray and appointment scheduled:  Future Appointments   Date Time Provider Department Center   1/6/2025  1:20 PM SW XR RM 1 SW XRAY [facility]         Shannon is also requesting a prescription for albuterol inhaler to use in place of nebulizer as they are going out of town. [facility]    LOV  1-2-25 APRN   12-20-24 Dr Pizano    LAST LAB  12-20-24    LAST RX 7-24-17    PROTOCOL    Asthma & COPD Medication Protocol [id number]/06/2025 11:10 AM   Protocol Details ACT Score greater than or equal to 20    ACT recorded in the last 12 months    Appointment in past 6 or next 3 months

## 2025-01-06 NOTE — TELEPHONE ENCOUNTER
LOV: 1/2/25 Cristin   LAST LAB 12/20/24 lipids,tsh,cbc,  LAST RX  ZOLPIDEM 10 MG Oral Tab 30 tablet 1 11/11/2024 --   Sig:   TAKE 1 TABLET(10 MG) BY MOUTH EVERY NIGHT     Next OV:   Future Appointments   Date Time Provider Department Center   1/6/2025  1:20 PM SW XR RM 1 SW XRAY [place]      PROTOCOL

## 2025-01-28 ENCOUNTER — MED REC SCAN ONLY (OUTPATIENT)
Dept: FAMILY MEDICINE CLINIC | Facility: CLINIC | Age: 60
End: 2025-01-28

## 2025-03-03 DIAGNOSIS — G47.26 SHIFT WORK SLEEP DISORDER: ICD-10-CM

## 2025-03-03 DIAGNOSIS — I10 ESSENTIAL HYPERTENSION: ICD-10-CM

## 2025-03-03 NOTE — TELEPHONE ENCOUNTER
LOV: 1-2-2025  LAST LAB: 12?20/2024  LAST RX:  Medication Quantity Refills Start End   losartan 50 MG Oral Tab 90 tablet 0 12/4/2024 --   Sig:   TAKE 1 TABLET(50 MG) BY MOUTH DAILY       Medication Quantity Refills Start End   zolpidem 10 MG Oral Tab 30 tablet 1 1/6/2025 --   Sig:   Take 1 tablet (10 mg total) by mouth     Next OV: No future appointments.   PROTOCOL

## 2025-03-04 RX ORDER — LOSARTAN POTASSIUM 50 MG/1
50 TABLET ORAL DAILY
Qty: 90 TABLET | Refills: 0 | Status: SHIPPED | OUTPATIENT
Start: 2025-03-04

## 2025-03-04 RX ORDER — ZOLPIDEM TARTRATE 10 MG/1
10 TABLET ORAL NIGHTLY
Qty: 30 TABLET | Refills: 1 | Status: SHIPPED | OUTPATIENT
Start: 2025-03-04

## 2025-03-27 NOTE — PROGRESS NOTES
Sebastian Hernandez is a 60 year old male.  HPI:     Patient in office for blood pressure elevation and sinus concern.  He reports his normal blood pressure is 120/70 but recently blood pressure has been \"all over the place\".  He provided a home log that shows blood pressures ranging from 120//91.  Reports he does not take his blood pressure at the same time every day.  He has been dealing with symptoms of Sinus pressure, (worse to the left maxilary sinus) runny nose, nasal congestion and scratchy throat.  Has been taking claritin daily.  He reports he had some clindamycin left over from a dental procedure and took a tapered dose of this over the past weekend due to going out of town and wanting to feel better.  Spoke with patient regarding antimicrobial resistance and importance of finishing antibiotic when they are prescribed and following prescriber instructions.      Saw ENT 1/28.  Exam normal at that time and recommendations given to use nasal saline QID.  He does not feel the nasal saline has been helpful.  He reports he a history of deviated septum with repair in the past.  Reviewed chart.  Documented sinus surgery of SEPTOPLASTY, BILATERAL INFERIOR TURBINATE REDUCTION, LEFT MAXILLARY ANTROSTOMY in 2020 by Dr. Young.  He reports continued pressure and pain to the left maxillary sinus with watery eyes and occasional cough that turns to a coughing fit.  Denies fever.      Current Outpatient Medications   Medication Sig Dispense Refill    losartan 50 MG Oral Tab Take 1 tablet (50 mg total) by mouth daily. 90 tablet 0    zolpidem 10 MG Oral Tab Take 1 tablet (10 mg total) by mouth nightly. 30 tablet 1    albuterol (PROAIR HFA) 108 (90 Base) MCG/ACT Inhalation Aero Soln Inhale 2 puffs into the lungs every 6 (six) hours as needed for Wheezing. 8.5 g 0    Respiratory Therapy Supplies (NEBULIZER/TUBING/MOUTHPIECE) Does not apply Kit Please provide nebulizer, tubing, and mouthpiece. 1 each 0    albuterol (2.5  MG/3ML) 0.083% Inhalation Nebu Soln Take 3 mL (2.5 mg total) by nebulization every 4 (four) hours as needed for Wheezing or Shortness of Breath (cough). 3 mL 1    hydrocortisone 2.5 % External Cream Apply bid rectally 30 g 0    famotidine 40 MG Oral Tab Take 1 tablet (40 mg total) by mouth 2 (two) times daily. (Patient not taking: Reported on 1/2/2025)      Loratadine-Pseudoephedrine (CLARITIN-D 24 HOUR OR) Take by mouth as needed.      Multiple Vitamin (MULTIVITAMIN TAB/CAP) Take 1 tablet by mouth daily. (Patient not taking: Reported on 1/2/2025)      tamsulosin HCl 0.4 MG Oral Cap Take 1 capsule (0.4 mg total) by mouth daily.        Past Medical History:    BPH (benign prostatic hyperplasia)    Diverticulitis    Diverticulosis of intestine without bleeding    Essential hypertension    History of total knee arthroplasty, left    Osteoarthritis    knees    Peyronie disease    Overview:   Pt reports in the past year he notes a lump a the base of the penis that is starting to restrict erections.  He denies pain with erections or sexual activity.  He does reports avoiding episodes where his partner would be on top.     Last Assessment & Plan:   Pathophysiology and diff dx reviewed wtith pt.  Management options reviewed wtih pt.  Recc avoiding further injury and not have h    Tubular adenoma of colon    Removed on colonoscopy dr parker 4/3/17    Visual impairment    contacts and glasses      Social History:  Social History     Socioeconomic History    Marital status:    Tobacco Use    Smoking status: Former     Current packs/day: 0.00     Types: Cigarettes     Start date: 2/11/1985     Quit date: 2/11/2015     Years since quitting: 10.1    Smokeless tobacco: Never   Vaping Use    Vaping status: Never Used   Substance and Sexual Activity    Alcohol use: Yes     Alcohol/week: 5.0 standard drinks of alcohol     Types: 5 Cans of beer per week     Comment: \"weekends\"    Drug use: No    Sexual activity: Yes      Partners: Female   Other Topics Concern    Caffeine Concern Yes     Comment: diet daily    Exercise No    Seat Belt Yes     Social Drivers of Health      [facility]    Housing Stability          REVIEW OF SYSTEMS:     Review of Systems   Constitutional:  Negative for activity change, appetite change and fatigue.   HENT:  Positive for congestion, sinus pressure, sinus pain and sore throat. Negative for hearing loss.    Eyes:  Positive for discharge (see hpi). Negative for redness.   Respiratory:  Positive for cough.    Cardiovascular:  Negative for chest pain.   Gastrointestinal:  Negative for abdominal distention and abdominal pain.   Endocrine: Negative for cold intolerance and heat intolerance.   Genitourinary:  Negative for difficulty urinating and urgency.   Musculoskeletal:  Negative for arthralgias and back pain.   Skin:  Negative for color change.   Allergic/Immunologic: Negative for environmental allergies.   Neurological:  Positive for headaches. Negative for dizziness and syncope.   Hematological:  Negative for adenopathy. Does not bruise/bleed easily.   Psychiatric/Behavioral:  Negative for agitation, behavioral problems and confusion.        EXAM:   /80   Pulse 70   Temp 98.8 °F (37.1 °C) (Temporal)   Resp 20   Ht 6' (1.829 m)   Wt 241 lb 6.4 oz (109.5 kg)   SpO2 98%   BMI 32.74 kg/m²     Physical Exam  Constitutional:       Appearance: Normal appearance.   HENT:      Head: Normocephalic.      Right Ear: Tympanic membrane normal.      Left Ear: Tympanic membrane is retracted.      Nose: Congestion and rhinorrhea present.      Right Turbinates: Swollen.      Left Turbinates: Swollen.      Left Sinus: Frontal sinus tenderness present.      Mouth/Throat:      Mouth: Mucous membranes are moist.   Eyes:      Pupils: Pupils are equal, round, and reactive to light.   Cardiovascular:      Rate and Rhythm: Normal rate and regular  rhythm.      Pulses: Normal pulses.      Heart sounds: Normal heart sounds.   Pulmonary:      Effort: Pulmonary effort is normal.      Breath sounds: Normal breath sounds.   Lymphadenopathy:      Cervical: Cervical adenopathy present.   Skin:     General: Skin is dry.      Capillary Refill: Capillary refill takes less than 2 seconds.   Neurological:      Mental Status: He is alert and oriented to person, place, and time.   Psychiatric:         Mood and Affect: Mood normal.         Behavior: Behavior normal.          ASSESSMENT AND PLAN:     1. Acute recurrent frontal sinusitis  Doxycycline and prednisone sent to pharmacy and instructions provided.  Recommended continuing use of Flonase and Claritin daily. Referral for ENT placed.   - Doxycycline Monohydrate 100 MG Oral Cap; Take 1 capsule (100 mg total) by mouth 2 (two) times daily.  Dispense: 20 capsule; Refill: 0  - predniSONE 20 MG Oral Tab; Take 2 tablets (40 mg total) by mouth daily for 5 days.  Dispense: 10 tablet; Refill: 0  - ENT - EXTERNAL    2. Essential hypertension  Continue with losartan 50 mg daily.  Take blood pressure every am and pm at least 2 hours after losartan administration.  Keep record of blood pressure for the next 5 days and contact office with readings.       The patient indicates understanding of these issues and agrees to the plan.      Elizabeth Marinelli, APRN  3/27/2025

## 2025-03-28 ENCOUNTER — OFFICE VISIT (OUTPATIENT)
Dept: FAMILY MEDICINE CLINIC | Facility: CLINIC | Age: 60
End: 2025-03-28
Payer: COMMERCIAL

## 2025-03-28 VITALS
HEART RATE: 70 BPM | TEMPERATURE: 99 F | HEIGHT: 72 IN | WEIGHT: 241.38 LBS | DIASTOLIC BLOOD PRESSURE: 80 MMHG | BODY MASS INDEX: 32.69 KG/M2 | SYSTOLIC BLOOD PRESSURE: 120 MMHG | OXYGEN SATURATION: 98 % | RESPIRATION RATE: 20 BRPM

## 2025-03-28 DIAGNOSIS — I10 ESSENTIAL HYPERTENSION: ICD-10-CM

## 2025-03-28 DIAGNOSIS — J01.11 ACUTE RECURRENT FRONTAL SINUSITIS: Primary | ICD-10-CM

## 2025-03-28 PROCEDURE — 99214 OFFICE O/P EST MOD 30 MIN: CPT

## 2025-03-28 RX ORDER — PREDNISONE 20 MG/1
40 TABLET ORAL DAILY
Qty: 10 TABLET | Refills: 0 | Status: SHIPPED | OUTPATIENT
Start: 2025-03-28 | End: 2025-04-02

## 2025-03-28 RX ORDER — DOXYCYCLINE 100 MG/1
100 CAPSULE ORAL 2 TIMES DAILY
Qty: 20 CAPSULE | Refills: 0 | Status: SHIPPED | OUTPATIENT
Start: 2025-03-28

## 2025-03-28 NOTE — PATIENT INSTRUCTIONS
Take florestor probiotic twice a day for 10 days while on antibiotic.  Use afrin nasal spray over the counter twice a day for three days then Flonase sensemist daily.  Continue to use claritin daily or switch to xyzol.    Take blood pressures every am and pm (at least 2 hours after taking losartan) for the next 5 days and contact office with readings.

## 2025-04-04 ENCOUNTER — TELEPHONE (OUTPATIENT)
Dept: FAMILY MEDICINE CLINIC | Facility: CLINIC | Age: 60
End: 2025-04-04

## 2025-04-04 NOTE — TELEPHONE ENCOUNTER
Patient advised to call back with blood pressure reads from both morning and evenings; 3/28//83 pm, 3/29//83 am, 3/30//85 am, 136/86 pm, 3/31//85 am, 144/85 pm, 4/1//85 am, 139/82 pm, 4/2/25- 126/75 pm, 4/3/25- 122/74 pm, 4/4//84 am.

## 2025-04-04 NOTE — TELEPHONE ENCOUNTER
There are a few hi numbers but in general those are acceptable --  no change medications --call if persistently high

## 2025-04-04 NOTE — TELEPHONE ENCOUNTER
Patient notified of provider comments and recommendations, verbalized understanding and agreement

## 2025-04-15 ENCOUNTER — TELEPHONE (OUTPATIENT)
Dept: FAMILY MEDICINE CLINIC | Facility: CLINIC | Age: 60
End: 2025-04-15

## 2025-04-15 DIAGNOSIS — Z13.6 SCREENING FOR CARDIOVASCULAR CONDITION: Primary | ICD-10-CM

## 2025-04-15 DIAGNOSIS — I10 ESSENTIAL HYPERTENSION: ICD-10-CM

## 2025-04-15 NOTE — TELEPHONE ENCOUNTER
Spoke with patient he states he is not having any heart issues but is considering getting coronary calcium test done. Would like to know Dr Pizano's opinion and if he would be able to order.  Aware Dr Pizano will review upon his return to the office.

## 2025-04-15 NOTE — TELEPHONE ENCOUNTER
Coronary calcium score test, he is interested in having done and would like to speak with clinical regarding details

## 2025-04-23 NOTE — TELEPHONE ENCOUNTER
I would agree and that is not a bad idea---would encourage that [unless he actually has symptom--which he states he does not]    I placed an order

## 2025-04-30 DIAGNOSIS — G47.26 SHIFT WORK SLEEP DISORDER: ICD-10-CM

## 2025-04-30 RX ORDER — ZOLPIDEM TARTRATE 10 MG/1
10 TABLET ORAL NIGHTLY
Qty: 30 TABLET | Refills: 1 | Status: SHIPPED | OUTPATIENT
Start: 2025-04-30

## 2025-04-30 NOTE — TELEPHONE ENCOUNTER
No protocol    OV 10/20/24 Dr. Pizano  REFILL 03/04/25 #30 +1 RF    Future Appointments   Date Time Provider Department Center   5/13/2025 10:45 AM Peterson Smith MD [id number]   5/28/2025  7:30 AM  CT MAIN RM4  [facility]

## 2025-05-13 ENCOUNTER — OFFICE VISIT (OUTPATIENT)
Facility: LOCATION | Age: 60
End: 2025-05-13
Payer: COMMERCIAL

## 2025-05-13 ENCOUNTER — TELEPHONE (OUTPATIENT)
Facility: LOCATION | Age: 60
End: 2025-05-13

## 2025-05-13 DIAGNOSIS — J32.0 SINUSITIS, MAXILLARY, CHRONIC: Primary | ICD-10-CM

## 2025-05-13 DIAGNOSIS — J32.1 CHRONIC FRONTAL SINUSITIS: ICD-10-CM

## 2025-05-13 PROCEDURE — 77011 CT SCAN FOR LOCALIZATION: CPT | Performed by: OTOLARYNGOLOGY

## 2025-05-13 PROCEDURE — 99204 OFFICE O/P NEW MOD 45 MIN: CPT | Performed by: OTOLARYNGOLOGY

## 2025-05-13 NOTE — TELEPHONE ENCOUNTER
No pre-certification required per online availity for CPT code 28619.     Transaction ID not found

## 2025-05-13 NOTE — PROGRESS NOTES
eSbastian Hernandez is a 60 year old male. No chief complaint on file.    HPI:   60-year-old white male he has been having increasing sinus issues to the point where he is now getting sinusitis 3-4 times a year and has accompaniment of the left lower lid eye swelling periorbital pain drainage congestion.  Symptoms seem to be primarily left-sided.  In 2020 patient had deviated septum repair and what sounds like a left maxillary antrostomy for possible polyp performed at [facility].  He originally did well but then through the years is gradually increased the frequency of recurrent sinusitis.  He has been on long-range Flonase without relief when he gets the infections he has to go on antibiotics and/or steroids.  Current Medications[1]   Past Medical History[2]   Social History:  Short Social Hx on File[3]   Past Surgical History[4]      REVIEW OF SYSTEMS:   GENERAL HEALTH: feels well otherwise  GENERAL : denies fever, chills, sweats, weight loss, weight gain  SKIN: denies any unusual skin lesions or rashes  RESPIRATORY: denies shortness of breath with exertion  NEURO: denies headaches    EXAM:   There were no vitals taken for this visit.    System Pertinent findings Details   Constitutional  Overall appearance - Normal.   Psychiatric  Orientation - Oriented to time, place, person & situation. Appropriate mood and affect.   Head/Face  Facial features -- Normal. Skull - Normal.   Eyes  Pupils equal ,round ,react to light and accomidate   Ears  External Ear Right: Normal, Left: Normal. Canal - Right: Normal, Left: Normal. TM - Right: Normal left: Normal   Nose  External Nose, Normal, Septum -midline nasal Vault, there is no obvious scar tissue formation or polyps some drainage noted,Turbinates - Right: Boggy left: Boggy   Mouth/Throat  Lips/teeth/gums - Normal. Tonsils -0+. Oropharynx - Normal.   Neck Exam  Inspection - Normal. Palpation - Normal. Parotid gland - Normal. Thyroid gland -normal   Neurological   Cranial nerves - Cranial nerves II through XII grossly intact.   Nasopharynx   Normal        Lymph Detail  Submental. Submandibular. Anterior cervical. Posterior cervical. Supraclavicular.   [employer] paranasal sinus CT scan:    Clinical indication: Chronic sinusitis    Exam title: CT guidance stereotactic localization of sinuses    Technique: ASR on mini CAT was used with a sinus CT protocol.  The patient was positioned seated upright with the head aligned and supported with malar retention.  A  film was used to ensure proper targeting.  Volume CT data was acquired.  Multiplanar reconstruction was performed on a viewing work stand to obtain axial and coronal images.  Images were manipulated to adjust contrast for bone window viewing.    Scan time: 20 seconds    Peak voltage: 120 K     Tube current: 48.30 MAS    Comparison to prior CT scans: None    Findings: Patient has bilateral kristine bullosa with what looks like on the left middle turbinate some opacification and/or scar tissue that is formed blocking the ostiomeatal unit.  There is some mucosal thickening of the floor the maxillary sinus on the left and then going up the anterior ethmoids to the frontal sinus there is mucosal thickening of the floor the frontal sinus noted    Diagnosis: Chronic maxillary and frontal sinusitis                ASSESSMENT AND PLAN:   1. Sinusitis, maxillary, chronic  See below    2. Chronic frontal sinusitis  Recommendation:  Left side only sinus endoscopic balloon sinuplasty of maxillary and frontal sinuses local anesthesia in office risks and complications discussed    Potentially, we could do the left side revision total ethmoidectomy maxillary enterostomy with turbinate reduction as an alternative.    Best option may be the in office balloon procedure were given set that up      The patient indicates understanding of these issues and agrees to the plan.      Peterson Smith MD  5/13/2025  12:32 PM         [1]    Current Outpatient Medications   Medication Sig Dispense Refill    ZOLPIDEM 10 MG Oral Tab TAKE 1 TABLET(10 MG) BY MOUTH EVERY NIGHT 30 tablet 1    Doxycycline Monohydrate 100 MG Oral Cap Take 1 capsule (100 mg total) by mouth 2 (two) times daily. 20 capsule 0    losartan 50 MG Oral Tab Take 1 tablet (50 mg total) by mouth daily. 90 tablet 0    albuterol (PROAIR HFA) 108 (90 Base) MCG/ACT Inhalation Aero Soln Inhale 2 puffs into the lungs every 6 (six) hours as needed for Wheezing. 8.5 g 0    albuterol (2.5 MG/3ML) 0.083% Inhalation Nebu Soln Take 3 mL (2.5 mg total) by nebulization every 4 (four) hours as needed for Wheezing or Shortness of Breath (cough). 3 mL 1    hydrocortisone 2.5 % External Cream Apply bid rectally 30 g 0    famotidine 40 MG Oral Tab Take 1 tablet (40 mg total) by mouth 2 (two) times daily.      Loratadine-Pseudoephedrine (CLARITIN-D 24 HOUR OR) Take by mouth as needed.      Multiple Vitamin (MULTIVITAMIN TAB/CAP) Take 1 tablet by mouth daily.      tamsulosin HCl 0.4 MG Oral Cap Take 1 capsule (0.4 mg total) by mouth daily.     [2]   Past Medical History:   BPH (benign prostatic hyperplasia)    Diverticulitis    Diverticulosis of intestine without bleeding    Essential hypertension    History of total knee arthroplasty, left    Osteoarthritis    knees    Peyronie disease    Overview:   Pt reports in the past year he notes a lump a the base of the penis that is starting to restrict erections.  He denies pain with erections or sexual activity.  He does reports avoiding episodes where his partner would be on top.     Last Assessment & Plan:   Pathophysiology and diff dx reviewed wtith pt.  Management options reviewed wt pt.  Recc avoiding further injury and not have h    Tubular adenoma of colon    Removed on colonoscopy dr parker 4/3/17    Visual impairment    contacts and glasses   [3]   Social History  Socioeconomic History    Marital status:    Tobacco Use    Smoking status: Former      Current packs/day: 0.00     Types: Cigarettes     Start date: 2/11/1985     Quit date: 2/11/2015     Years since quitting: 10.2    Smokeless tobacco: Never   Vaping Use    Vaping status: Never Used   Substance and Sexual Activity    Alcohol use: Yes     Alcohol/week: 5.0 standard drinks of alcohol     Types: 5 Cans of beer per week     Comment: \"weekends\"    Drug use: No    Sexual activity: Yes     Partners: Female   Other Topics Concern    Caffeine Concern Yes     Comment: diet daily    Exercise No    Seat Belt Yes     Social Drivers of Health     Food Insecurity: No Food Insecurity (3/28/2025)    NCSS - Food Insecurity     Worried About Running Out of Food in the Last Year: No     Ran Out of Food in the Last Year: No   Transportation Needs: No Transportation Needs (3/28/2025)    NCSS - Transportation     Lack of Transportation: No   Housing Stability: Not At Risk (3/28/2025)    NCSS - Housing/Utilities     Has Housing: Yes     Worried About Losing Housing: No     Unable to Get Utilities: No   [4]   Past Surgical History:  Procedure Laterality Date    Colectomy      Colonoscopy  04/03/2017    Hernia surgery  08/30/2021    Knee replacement surgery      Aug 2018,Left knee    Other surgical history      LEFT KNEE ACL RECONSTRUCTION    Sinus surgery        deviated septum and sinus surgery    Tonsillectomy

## 2025-05-19 ENCOUNTER — TELEPHONE (OUTPATIENT)
Facility: LOCATION | Age: 60
End: 2025-05-19

## 2025-05-19 NOTE — TELEPHONE ENCOUNTER
Checked Online for B&E and to start Authorization:    Authorization Response:  Pended Added Clinical Documentation       Requesting a drug authorization? Submit your request through the [employer] portal instead. You can find [employer] in \"Drug Prior Authorizations\".  Transaction ID: [id number] ID: [id number] Date: 2025-05-19  TOMI MARCIAL Patient  Member ID  [id number]    Date of Birth  1965-02-15    Gender  Male    Transaction Type  Outpatient Authorization    Organization  [facility]    Payer  AETNA (COMMERCIAL & MEDICARE)    Aetna logo      Certificate Information  Reference Number  [id number]    Status  PENDED    Review Reason 1  Requires Medical Review    Message  THIS PRECERTIFICATION REQUEST REQUIRES ADDITIONAL INFORMATION PLEASE FOLLOW THE TRADING PARTNER INSTRUCTIONS TO COMPLETE A QUESTIONNAIRE FOR THIS REQUEST    Member Information  Patient Name  TOMI MARCIAL    Patient Date of Birth  1965-02-15    Patient Gender  Male    Member ID  [id number]    Relationship to Subscriber  Self    Subscriber Name  TOMI MARCIAL    Requesting Provider     Name  SOPHIA LOPES  [id number]    Provider Role  Provider    Address  [address] [place] [address]    Phone  [phone]    Contact Name  IRENE DANIELLE    Service Information  Service Type  -    Place of Service  11 - Office    Diagnosis Code 1  J321 - Chronic frontal sinusitis    Diagnosis Code 2  J320 - Chronic maxillary sinusitis    Diagnosis Code 3  J324 - Chronic pansinusitis    Procedure Code 1 (CPT/HCPCS)  48536 - NSL/SINS NDSC SURG FRNT SINS    Quantity  2 Units    Procedure From - To Date  2025-06-01    Status  PENDED    Status Reason  Requires Medical Review    Procedure Code 2 (CPT/HCPCS)  86189 - NSL/SINS NDSC SURG MAX SINS    Quantity  2 Units    Procedure From - To Date  2025-06-01    Status  PENDED    Status Reason  Requires Medical Review    Rendering Provider/Facility      Provider  Name  SOPHIA LOPES    NPI  [id number]    Provider Role  Attending    Attachment(s)  Attachment 1  File Name  Tomi Hernandez.pdf    Document Id  [id number]    Date of Service May 19, 2025  Transaction ID [id number]  Transaction Time May 19, 3:16 PM  Customer ID [id number]  TOMI HERNANDEZ    [address] [place] [address]    Member Status    Active Coverage  Date of Birth    Feb 15, 1965  Gender    Male  Relationship to Subscriber    Self    Member ID:  [id number]  Group Number:  [id number]  Group Name:  [facility] ([facility] PPO)  Plan Number:  [id number]  Plan Begin Date:  Jun 30, 2023  Eligibility Begin Date:  Jul 1, 2017  AETNA LOGO  Payer: [employer]    Other or Additional Payer Information    No additional payer information provided.  Requesting Provider  Name: SOPHIA LOPES    Category: Requesting Provider    NPI: [id number]    Payer Assigned Provider ID: [id number]    FILTER BY NETWORK      Plan Maximums and Deductibles  Active Coverage  Insurance Type: Point of Service (POS)    Plan / Product: Aetna Choice POS II    Coverage Level: Employee and Spouse    Information / Details Individual Family  Annual Deductible   Network Not Applicable  Plan Start Date: Jul 1, 2024    DED INCLUDED IN OOP  $425 / Contract(s)    -$425 Year to Date    $0 Remaining    $1,000 / Contract(s)    -$850 Year to Date    $150 Remaining    Out Of Pocket   In Network  All Other In-Network Providers  $1,750 / Contract(s)    -$1,488.73 Year to Date    $261.27 Remaining    $4,375 / Contract(s)    -$3,238.73 Year to Date    $1,136.27 Remaining    Unlimited  Coverage Level: Employee and Spouse    Limitations  Coverage Level: Employee and Spouse    Our records indicate the provider ID you entered includes both in-network and out of network providers.  Service Level Contact Information  Name: PCP SELECTION NOT REQUIRED    Category: Primary Care Provider    Type: Primary Care  Provider    Benefit Information   Active Coverage  Coverage Level: Employee and Spouse    Information / Details Co-Insurance Co-Payment Benefit Deductible  Limitations  Authorization   In Network  Coverage Level: Employee and Spouse    All Other In-Network Providers  Routine Colorectal Surgeon  0%  45 Minimum Age  $0  45 Minimum Age  Refer to: Health Benefit Plan Coverage    In Network  Coverage Level: Employee and Spouse    All Other In-Network Providers  Surgeon  Outpatient Surgeon    $0  Refer to: Health Benefit Plan Coverage    In Network  Coverage Level: Employee and Spouse    All Other In-Network Providers  Surgeon,COINS APPLIES TO OUT OF POCKET  Outpatient Surgeon,COINS APPLIES TO OUT OF POCKET  15%    Refer to: Health Benefit Plan Coverage    In Network  Coverage Level: Employee and Spouse    All Other In-Network Providers  Routine Colorectal Surgeon/Plan Ded Waived    Refer to: Health Benefit Plan Coverage    Network Not Applicable  Coverage Level: Employee and Spouse    This plan may require precert for certain services. To check if one is required please refer to the Code Search Tool on the Aetna website or submit a Precert transaction.    Refer to: Health Benefit Plan Coverage    Coverage Level: Employee and Spouse    Plan includes NAP, but program limitations may apply in relation to Third Party [employer]. Final determination is made at the time of claim processing.  SELF-FUNDED    Refer to: Health Benefit Plan Coverage    In Network  Place of Service: Office    Coverage Level: Employee and Spouse    All Other In-Network Providers  Routine Colorectal Surgeon  0%  45 Minimum Age  $0  45 Minimum Age  Refer to: Health Benefit Plan Coverage    In Network  Place of Service: Office    Coverage Level: Employee and Spouse    All Other In-Network Providers  Surgeon  --    $0  Refer to: Health Benefit Plan Coverage    In Network  Place of Service: Office    Coverage Level: Employee and Spouse    All Other  In-Network Providers  Surgeon,COINS APPLIES TO OUT OF POCKET  15%    Refer to: Health Benefit Plan Coverage    In Network  Place of Service: Office    Coverage Level: Employee and Spouse    All Other In-Network Providers  Routine Colorectal Surgeon/Plan Ded Waived    Refer to: Health Benefit Plan Coverage

## 2025-05-24 ENCOUNTER — ORDER TRANSCRIPTION (OUTPATIENT)
Dept: ADMINISTRATIVE | Facility: HOSPITAL | Age: 60
End: 2025-05-24

## 2025-05-24 DIAGNOSIS — Z13.6 SCREENING FOR CARDIOVASCULAR CONDITION: Primary | ICD-10-CM

## 2025-05-28 ENCOUNTER — HOSPITAL ENCOUNTER (OUTPATIENT)
Dept: CT IMAGING | Facility: HOSPITAL | Age: 60
Discharge: HOME | End: 2025-05-28
Attending: FAMILY MEDICINE

## 2025-05-28 VITALS — HEIGHT: 72 IN | BODY MASS INDEX: 31.83 KG/M2 | WEIGHT: 235 LBS

## 2025-05-28 DIAGNOSIS — Z13.6 SCREENING FOR CARDIOVASCULAR CONDITION: ICD-10-CM

## 2025-05-28 NOTE — PROGRESS NOTES
Date of Service 5/28/2025    TOMI MARCIAL  Date of Birth 2/15/1965    Patient Age: 60 year old    PCP: Charbel Pizano MD  [address] [place] [id number]    Heart Scan Consult  Preliminary Heart Scan Score: 752    Previous Screening  Heart Scan Completed Previously: No        Peripheral Vascular Scan Completed Previously: No          Risk Factors  Personal Risk Factors  Non-alterable Risk Factors: Personal History, Age, Gender (Patient has high blood pressure and is a former smoker.)  Alterable Risk Factors: Lack of exercise, Obesity, High Blood Pressure      Body Mass Index  Body mass index is 31.87 kg/m².    Blood Pressure  Blood Pressure measurement declined during this encounter.  (Normal =< 120/80,  Elevated = 120-129/ >80,  High Stage1 130-139/80-89 , Stage2 >140/>90)    Lipid Profile  Cholesterol: 165, done on 12/20/2024.  HDL Cholesterol: 52, done on 12/20/2024.  LDL Cholesterol: 100, done on 12/20/2024.  TriGlycerides 66, done on 12/20/2024.    Cholesterol Goals  Value   Total  =< 200   HDL  = > 45 Men = > 55 Women   LDL   =< 100   Triglycerides  =< 150       Glucose and Hemoglobin A1C  Lab Results   Component Value Date    GLU 93 12/20/2024    A1C 5.6 12/20/2024     (Normal Fasting Glucose < 100mg/dl )    Nurse Review  Risk factor information and results reviewed with Nurse: Yes    Recommended Follow Up:  Consult your physician regarding:: Final Heart Scan Report, Discuss potential for Incidental Finding, Discuss Potential for Score Variance      Recommendations for Change:  Nutrition Changes: Low Saturated Fat, Low Fat Dairy, Low Salt Eating, Increase Fiber (Eats mostly chicken and fish. Some cheese and encouraged to limit and choose low fat cheese. Lots of fruits. Encouraged to increase fiber as well with flax, patricia and whole grain bread.)    Cholesterol Modification (goal of therapy depends upon your risk): No Change Needed    Exercise: Initiate Program (Discuss with your doctor an exercise  program.)    Smoking Cessation: > 1 Year Ago    Weight Management: Decrease Current Weight    Stress Management: Adopt Stress Management Techniques    Repeat Heart Scan: 3 Years if Calcium Score is > 0.0, Discuss with your Physician              [place] Recommended Resources:  Recommended Resources: Upcoming Classes, Medical Services and Health Library [email or web address]        Final heart scan report may take 2 weeks.    Nevin HENSON RN        Please Contact the Nurse Heart Line with any Questions or Concerns [phone].

## 2025-05-30 DIAGNOSIS — I10 ESSENTIAL HYPERTENSION: ICD-10-CM

## 2025-05-30 RX ORDER — LOSARTAN POTASSIUM 50 MG/1
50 TABLET ORAL DAILY
Qty: 90 TABLET | Refills: 1 | Status: SHIPPED | OUTPATIENT
Start: 2025-05-30

## 2025-05-30 NOTE — TELEPHONE ENCOUNTER
Hypertension Medications Protocol [id number]/30/2025 09:17 AM   Protocol Details CMP or BMP in past 12 months    Last BP reading less than 140/90    In person appointment or virtual visit in the past 12 mos or appointment in next 3 mos    EGFRCR or GFRNAA > 50    Medication is active on med list          Last office visit: 03/28/25  Last refill: 03/04/25  #90, no refills  Last cmp:  12/20/24    Future Appointments   Date Time Provider Department Center   6/13/2025  9:00 AM Peterson Smith MD [id number]

## 2025-06-02 ENCOUNTER — PATIENT MESSAGE (OUTPATIENT)
Dept: FAMILY MEDICINE CLINIC | Facility: CLINIC | Age: 60
End: 2025-06-02

## 2025-06-04 ENCOUNTER — TELEPHONE (OUTPATIENT)
Facility: LOCATION | Age: 60
End: 2025-06-04

## 2025-06-04 NOTE — TELEPHONE ENCOUNTER
Peer to peer review of this patient's records required clarification to get approval for upcoming sinus procedure.  Specifically patient was verified that he has been on a daily basis using saline irrigation for last 12 months.  In addition he uses Flonase nasal spray as well.       The CT scan performed specifically showed 10 mm mucosal thickening in the floor of the right maxillary sinus and 5 mm mucosal thickening in the floor the left maxillary sinus and thickening of the frontal sinuses bilaterally.

## 2025-06-09 ENCOUNTER — TELEPHONE (OUTPATIENT)
Facility: LOCATION | Age: 60
End: 2025-06-09

## 2025-06-09 DIAGNOSIS — J32.1 CHRONIC FRONTAL SINUSITIS: Primary | ICD-10-CM

## 2025-06-09 DIAGNOSIS — J32.0 CHRONIC MAXILLARY SINUSITIS: ICD-10-CM

## 2025-06-09 RX ORDER — ACETAMINOPHEN AND CODEINE PHOSPHATE 300; 30 MG/1; MG/1
TABLET ORAL
Qty: 20 TABLET | Refills: 0 | Status: SHIPPED | OUTPATIENT
Start: 2025-06-09

## 2025-06-09 RX ORDER — DIAZEPAM 5 MG/1
TABLET ORAL
Qty: 3 TABLET | Refills: 0 | Status: SHIPPED | OUTPATIENT
Start: 2025-06-09

## 2025-06-09 NOTE — TELEPHONE ENCOUNTER
Fax for approval of Tylenol #3 is approved from 06/09/2025-07/09/2025.     PA #: [id number]    Fax has been sent to be scanned for chart.

## 2025-06-13 ENCOUNTER — OFFICE VISIT (OUTPATIENT)
Facility: LOCATION | Age: 60
End: 2025-06-13
Payer: COMMERCIAL

## 2025-06-13 DIAGNOSIS — J32.0 SINUSITIS, MAXILLARY, CHRONIC: Primary | ICD-10-CM

## 2025-06-13 DIAGNOSIS — J32.1 CHRONIC FRONTAL SINUSITIS: ICD-10-CM

## 2025-06-13 PROCEDURE — 31296 NSL/SINS NDSC SURG FRNT SINS: CPT | Performed by: OTOLARYNGOLOGY

## 2025-06-13 PROCEDURE — 31295 NSL/SINS NDSC SURG MAX SINS: CPT | Performed by: OTOLARYNGOLOGY

## 2025-06-13 NOTE — PROGRESS NOTES
Balloon sinuplasty operative report    Preoperative diagnosis: chronic frontal sinusitis, chronic maxillary sinusitis    Postoperative diagnosis: Chronic frontal sinusitis, chronic maxillary sinusitis    Procedure: Bilateral sinus endoscopic balloon sinuplasty frontal, bilateral sinuplasty maxillary sinuses.    Surgeon: Peterson Smith MD    Anesthetic: Local, in office procedure, site of service #11    Operative report: Patient is brought into the treatment area in the office, he is topically anesthetized with anesthetic spray.  Using the sinus endoscope 1% Xylocaine with 1 to 100,000 units of epinephrine was injected into the middle turbinate bilaterally and the uncinate process bilaterally.  The 0 degree sinus scope was used with video apparatus, the IntegenX- Nebo balloon system was used.    Starting on the left side using the 0 degree scope the balloon apparatus was placed up the left nasofrontal duct behind the uncinate process.  The lighted wire cable was then advanced into the frontal sinus, we obtained a good light pattern within the sinus.  The balloon was then advanced, and dilated to 12 PSI.  The balloon was then removed.  A similar procedure was performed on the opposite side.    The balloon apparatus was then modified for the use in the maxillary sinus.  Again starting on the left side, using the 0 degree sinus scope.  The maxillary sinus seeker was used to locate the natural ostia of the maxillary sinus.  Then the balloon apparatus was placed directly through that site.  The lighted wire cable was advanced getting a good light pattern within the left maxillary sinus.  Then the balloon was advanced and then dilated to 12 PSI.  The balloon was then removed.  A similar procedure was performed on the opposite side.    Patient tolerated procedure well.  No complications minimal blood loss.    Routine post balloon instructions were given to the patient both verbally as well is a postop instruction sheet.   As per our usual patient was required to have a  and instructions were given to the  as well.

## 2025-06-20 ENCOUNTER — OFFICE VISIT (OUTPATIENT)
Facility: LOCATION | Age: 60
End: 2025-06-20
Payer: COMMERCIAL

## 2025-06-20 DIAGNOSIS — J32.0 SINUSITIS, MAXILLARY, CHRONIC: Primary | ICD-10-CM

## 2025-06-20 DIAGNOSIS — J32.1 CHRONIC FRONTAL SINUSITIS: ICD-10-CM

## 2025-06-20 PROCEDURE — 31237 NSL/SINS NDSC SURG BX POLYPC: CPT | Performed by: OTOLARYNGOLOGY

## 2025-06-20 RX ORDER — LEVOFLOXACIN 500 MG/1
500 TABLET, FILM COATED ORAL EVERY 24 HOURS
Qty: 10 TABLET | Refills: 0 | Status: SHIPPED | OUTPATIENT
Start: 2025-06-20

## 2025-06-20 NOTE — PROGRESS NOTES
This patient is status post endoscopic sinus procedure status post balloon sinuplasty maxillary and frontal sinuses date of procedure 6/13/2025 in for his 1st postoperative debridement procedure.  Patient was topically anesthetized with anesthetic spray.  He was debrided using the 0 degree sinus scope bilateral.  Patient tolerated the procedure well.  He was given  routine post debridement instructions.  He will follow-up patient states he is having excessive drainage from the place of on Levaquin follow-up if the condition persistent

## 2025-06-27 DIAGNOSIS — G47.26 SHIFT WORK SLEEP DISORDER: ICD-10-CM

## 2025-06-28 RX ORDER — ZOLPIDEM TARTRATE 10 MG/1
10 TABLET ORAL NIGHTLY
Qty: 30 TABLET | Refills: 1 | Status: SHIPPED | OUTPATIENT
Start: 2025-06-28

## 2025-07-07 ENCOUNTER — TELEPHONE (OUTPATIENT)
Dept: FAMILY MEDICINE CLINIC | Facility: CLINIC | Age: 60
End: 2025-07-07

## 2025-07-07 DIAGNOSIS — R91.1 LUNG NODULE SEEN ON IMAGING STUDY: Primary | ICD-10-CM

## 2025-07-07 DIAGNOSIS — Z87.891 HISTORY OF TOBACCO USE: ICD-10-CM

## 2025-07-07 DIAGNOSIS — R91.1 PULMONARY NODULE LESS THAN 6 MM DETERMINED BY COMPUTED TOMOGRAPHY OF LUNG: ICD-10-CM

## 2025-07-07 NOTE — TELEPHONE ENCOUNTER
See CT calcium scoring report dated 5-28-25    Charbel Pizano MD  5/28/2025 10:21 AM CDT       Very small single node in lung   considered insignificant  no follow up needed    no other issues in chest   This is the read for the chest scan for all things NOT heart  The HEART portion is a separate report not yet available     BUT--I have an incomplete verbal report that the HEART scan may be showing a high number for the calcium score, which is the measure for coronary artery calcification     Will await the final report--but I would refer to cardiology for evaluation if the final is in fact consistent with this prelim        Reviewed above with patient and it was mentioned to him by cardiologist to follow up with PCP regarding nodule so he is just verifying nothing further needs to be done regarding lung nodule.

## 2025-07-07 NOTE — TELEPHONE ENCOUNTER
Patient had CT done in May.  There was a nodule on his lungs.  He wanted to know what the next steps is.

## 2025-07-08 NOTE — TELEPHONE ENCOUNTER
Patient notified of provider comments/recommendations regarding follow up on lung nodule, verbalized understanding and agreement.    Patient reminder set up in Epic.

## 2025-07-08 NOTE — TELEPHONE ENCOUNTER
I answered this based on low risk patient    In review--he had a tobacco history until 2015 by history   This increases risk slightly    So I would do ct lung fo nodule in one year       I placed an order for May 2026

## 2025-08-26 DIAGNOSIS — G47.26 SHIFT WORK SLEEP DISORDER: ICD-10-CM

## 2025-08-27 RX ORDER — ZOLPIDEM TARTRATE 10 MG/1
10 TABLET ORAL NIGHTLY
Qty: 30 TABLET | Refills: 0 | Status: SHIPPED | OUTPATIENT
Start: 2025-08-27

## (undated) DIAGNOSIS — G47.26 SHIFT WORK SLEEP DISORDER: ICD-10-CM

## (undated) DIAGNOSIS — I10 ESSENTIAL HYPERTENSION: ICD-10-CM

## (undated) DEVICE — MONOFILAMENT ABSORBABLE SUTURE: Brand: MAXON

## (undated) DEVICE — PROXIMATE LINEAR STAPLER RELOADS: Brand: PROXIMATE

## (undated) DEVICE — KENDALL SCD EXPRESS SLEEVES, KNEE LENGTH, MEDIUM: Brand: KENDALL SCD

## (undated) DEVICE — DECANTER BAG 9": Brand: MEDLINE INDUSTRIES, INC.

## (undated) DEVICE — SPONGE STICK WITH PVP-I: Brand: KENDALL

## (undated) DEVICE — UNDYED BRAIDED (POLYGLACTIN 910), SYNTHETIC ABSORBABLE SUTURE: Brand: COATED VICRYL

## (undated) DEVICE — GOWN,SIRUS,FABRIC-REINFORCED,X-LARGE: Brand: MEDLINE

## (undated) DEVICE — GLOVE ORTHO ALOETOUCH SZ 8-1/2

## (undated) DEVICE — DEVICE CLOSE STRATAFIX L30 CM

## (undated) DEVICE — SOL  .9 3000ML

## (undated) DEVICE — WRAP COOLING KNEE W/ICE PILLOW

## (undated) DEVICE — GAMMEX® NON-LATEX PI TEXTURED SIZE 7.5, STERILE POLYISOPRENE POWDER-FREE SURGICAL GLOVE: Brand: GAMMEX

## (undated) DEVICE — ZIMMER® STERILE DISPOSABLE TOURNIQUET CUFF WITH PLC, DUAL PORT, SINGLE BLADDER, 34 IN. (86 CM)

## (undated) DEVICE — LAP COLON CDS: Brand: MEDLINE INDUSTRIES, INC.

## (undated) DEVICE — TROCAR: Brand: KII SHIELDED BLADED ACCESS SYSTEM

## (undated) DEVICE — Device: Brand: STABLECUT®

## (undated) DEVICE — CONVERTORS STOCKINETTE: Brand: CONVERTORS

## (undated) DEVICE — ULNAR NERVE CUSHION

## (undated) DEVICE — HOOD, PEEL-AWAY: Brand: FLYTE

## (undated) DEVICE — DRAPE,U/SHT,SPLIT,FILM,60X84,STERILE: Brand: MEDLINE

## (undated) DEVICE — STERILE PATIENT PROTECTIVE PAD FOR IMP® KNEE POSITIONERS & COHESIVE WRAP (10 / CASE): Brand: DE MAYO KNEE POSITIONER®

## (undated) DEVICE — DRAIN RELIAVAC 100CC

## (undated) DEVICE — TOTAL KNEE CDS: Brand: MEDLINE INDUSTRIES, INC.

## (undated) DEVICE — PROXIMATE RELOADABLE LINEAR STAPLER: Brand: PROXIMATE

## (undated) DEVICE — LIGHT HANDLE

## (undated) DEVICE — SUTURE VICRYL 0

## (undated) DEVICE — 40580 - THE PINK PAD - ADVANCED TRENDELENBURG POSITIONING KIT: Brand: 40580 - THE PINK PAD - ADVANCED TRENDELENBURG POSITIONING KIT

## (undated) DEVICE — BAG DRAIN INFECTION CNTRL 2000

## (undated) DEVICE — SYRINGE 30ML LL TIP

## (undated) DEVICE — MEDI-VAC TUBING CONNECTOR 6-IN-1 "Y" POLYPROPYLENE: Brand: CARDINAL HEALTH

## (undated) DEVICE — Device

## (undated) DEVICE — STERILE POLYISOPRENE POWDER-FREE SURGICAL GLOVES: Brand: PROTEXIS

## (undated) DEVICE — GENERAL LAPAROS CDS-LF: Brand: MEDLINE INDUSTRIES, INC.

## (undated) DEVICE — VIOLET BRAIDED (POLYGLACTIN 910), SYNTHETIC ABSORBABLE SUTURE: Brand: COATED VICRYL

## (undated) DEVICE — PAD SACRAL SPAN AID

## (undated) DEVICE — SUTURE VICRYL 5-0 PC-1

## (undated) DEVICE — CORD MONOPOLAR DISP

## (undated) DEVICE — SUTURE VICRYL PLUS 2-0 X-1

## (undated) DEVICE — TOWEL: OR BLU 80/CS: Brand: MEDICAL ACTION INDUSTRIES

## (undated) DEVICE — SOL  .9 1000ML BTL

## (undated) DEVICE — INSUFFLATION NEEDLE TO ESTABLISH PNEUMOPERITONEUM.: Brand: INSUFFLATION NEEDLE

## (undated) DEVICE — CLOSING BUNDLE: Brand: MEDLINE INDUSTRIES, INC.

## (undated) DEVICE — 3M™ COBAN™ NL STERILE NON-LATEX SELF-ADHERENT WRAP, 2084S, 4 IN X 5 YD (10 CM X 4,5 M), 18 ROLLS/CASE: Brand: 3M™ COBAN™

## (undated) DEVICE — TUBING CYSTO

## (undated) DEVICE — DRESSING AQUACEL AG 3.5X12

## (undated) DEVICE — DRAIN RELIAVAC W/DRN MED 1/8

## (undated) DEVICE — SUTURE PDS II 1 CTX

## (undated) DEVICE — DRAIN CHANNEL 19FR BLAKE

## (undated) DEVICE — 3M(TM) MICROPORE TAPE DISPENSER 1535-2: Brand: 3M™ MICROPORE™

## (undated) DEVICE — PIN

## (undated) DEVICE — Device: Brand: PLUMEPEN

## (undated) DEVICE — PLUMEPORT ACTIV LAPAROSCOPIC SMOKE FILTRATION DEVICE: Brand: PLUMEPORT ACTIVE

## (undated) DEVICE — SUTURE ETHILON 2-0 FS

## (undated) DEVICE — GLOVE SURG TRIUMPH SZ 8-1/2

## (undated) DEVICE — PINS

## (undated) DEVICE — SIGMOIDOSCOPE LIGHTED BIOSEAL

## (undated) DEVICE — TROCAR: Brand: KII® SLEEVE

## (undated) DEVICE — CAPSURE PERMANENT FIXATION SYSTEM 30 PERMANENT FASTENERS: Brand: CAPSURE PERMANENT FIXATION SYSTEM

## (undated) DEVICE — SUTURE PROLENE 2-0 SH

## (undated) DEVICE — SCD SLEEVE KNEE HI BLEND

## (undated) DEVICE — NEEDLE SPINAL 20X3-1/2 YELLOW

## (undated) DEVICE — LIGASURE IMPACT OPEN DEVICE

## (undated) NOTE — Clinical Note
I had the pleasure of seeing Jeremy Serve on 10/8/2018. Please see my attached note. Wilda Delgadillo MD FACSEMG--Surgery

## (undated) NOTE — LETTER
Your patient was recently seen at the [facility] for a hospital follow-up visit. The visit note is attached. Please contact the clinic with any questions at [phone].     Thank you,  KESHAV Szymanski

## (undated) NOTE — MR AVS SNAPSHOT
[facility]  [address] [facility] [id number] [phone]               Thank you for choosing us for your health care visit with Jocelynn Lehman MD.  We are glad to serve you and happy to provide you with this summary o face. Using a vaporizer along with a menthol rub at night may also help.   · An expectorant containing guaifenesin may help thin the mucus and promote drainage from the sinuses.   · Over-the-counter decongestants may be used unless a similar medicine was pr · Symptoms not resolving within 10 days  © 5331-7878 The [address] [place] [address], [address] [place] [address] [place]. All rights reserved. This information is not intended as a substitute for professional medical care.  Always follow your healthcare pr - albuterol sulfate (2.5 MG/3ML) 0.083% Nebu  - Albuterol Sulfate  (90 Base) MCG/ACT Aers  - Amoxicillin-Pot Clavulanate 875-125 MG Tabs            MyChart     Visit MyChart  You can access your MyChart to more actively manage your health care and increments are effective and add up over the week   2 ½ hours per week – spread out over time Use a diandra to keep you motivated   Don’t forget strength training with weights and resistance Set goals and track your progress   You don’t need to join a gym.

## (undated) NOTE — Clinical Note
I had the pleasure of seeing Enrique Mims on 12/19/2018. Please see my attached note. Lamonte Chavez MD FACSEMG--Surgery

## (undated) NOTE — LETTER
21    Patient: Kae Kerns  : 2/15/1965 Visit date: 2021    Dear  Hodan Rabago MD    Thank you for referring Kae Kerns to my practice. Please find my assessment and plan below.        Assessment   Diverticulitis  (primary

## (undated) NOTE — Clinical Note
[address] [place] [address] [address] [address]  [phone]        Date: 5/3/2017      Patient Name: Mariahcinthya Danielitos            To Whom it may concern:     This letter has been written at the [facility]

## (undated) NOTE — Clinical Note
I had the pleasure of seeing Marco A Pretty on 11/5/2018. Please see my attached note. Nano Roger MD FACSEMG--Surgery

## (undated) NOTE — Clinical Note
I had the pleasure of seeing Arline De Lunaite on 9/27/2017. Please see my attached note.   Latasha Calhoun MD FACS EMG--Surgery

## (undated) NOTE — Clinical Note
I had the pleasure of seeing Radha Bartlett on 1/16/2019. Please see my attached note. Zulay Barnett MD FACSEMG--Surgery

## (undated) NOTE — Clinical Note
[address] [place] [address], [facility] [address]  [phone]        Date: 5/12/2017      Patient Name: Arline Favorite            To Whom it may concern:     This letter has been written at the pa

## (undated) NOTE — IP AVS SNAPSHOT
Patient Demographics     Address  [address] [place] [address] Phone  [phone] [facility])  [phone] (Work)  [phone] [facility]) E-mail Address  [email or web address]      Emergency Contact(s)     Name Relation Home Work Mobile    Shannon Hernandez Spouse 717- 4 weeks the day AFTER COMPLETION of the entire Xarelto course        Knee Replacement Discharge Instructions    Activity    Bathing  ?  No tub baths, pools, or saunas until cleared by surgeon (about 4-6 weeks because it takes that long for the incision on t Aquacel (waterproof) dressing changed (which is about 7 days after surgery). Continue this until you follow up in the office with your surgeon. Have knee bent when changing dressing for more ease of bending afterwards.   ? There could be a small amount of r like getting out of bed or walking. ? As you have less discomfort, decrease the amount of pain medication you take. Use plain Tylenol (acetaminophen) for less severe pain. ?  Some pain medications have Tylenol (acetaminophen) in them such as Norco and Per replacement. Speak with your physician about this at your post-op office visit. ? Eat a balanced diet high in fiber and drink plenty of fluids. ?  Continue using incentive spirometry because narcotics make you sleepy so you may not take good deep breaths ? If traveling by car, get out to stretch every 2 hours. This helps prevent stiffness. You may need to do this any time you travel for the first year after surgery.   ? If traveling by plane, BEFORE you get into a security line, let them know that you had ACETAMINOPHEN EXTRA STRENGTH 500 MG Caps  Generic drug:  Acetaminophen      Take 1,000 mg by mouth one time.           albuterol sulfate (2.5 MG/3ML) 0.083% Nebu  Commonly known as:  VENTOLIN      Take 3 mL (2.5 mg total) by nebulization every 4 (four) hour Where to Get Your Medications      Please  your prescriptions at the location directed by your doctor or nurse    Bring a paper prescription for each of these medications  celecoxib 200 MG Caps  ferrous sulfate 325 (65 FE) MG Tbec  HYDROcodone-aceta Patient Weight  104.9 kg (231 lb 2.4 oz)         Lab Results Last 24 Hours      TSH W REFLEX TO FREE T4 [[id number]] (Normal)  Resulted: 08/22/18 0525, Result status: Final result   Ordering provider:  Teo Caraballo MD  08/21/18 2300 Resulting lab:  [facility] Consults signed by Ian Mueller MD at 8/21/2018  3:19 PM     Author:  Ian Mueller MD Service:  Hospitalist Author Type:  Physician    Filed:  8/21/2018  3:19 PM Date of Service:  8/21/2018 12:37 PM Status:  Signed    :  Ian Mueller MD ([phone] Problem Relation Age of Onset   • Hypertension Father    • Thyroid disease Mother    • Cancer Paternal Grandmother      breast       Allergies:   Augmentin [Amoxicil*    HIVES  Erythromycin            DIARRHEA    Comment:Other reaction(s): Unknown  Penicil K, CL, CO2, ALKPHO, AST, ALT, BILT, TP in the last 168 hours. No results for input(s): PTP, INR in the last 168 hours. No results for input(s): TROP, CK in the last 168 hours. Imaging: Imaging data reviewed in Epic. ASSESSMENT / PLAN:     1. Diverticulosis of intestine without bleeding     Weight loss      Reason for Admission: Left Total Knee Arthroplasty    Physical Exam: The incision is well healed without erythema, fluctuance or significant drainage. Motion is limited.   Neurovascular e CONTINUE these medications which have NOT CHANGED    Acetaminophen (ACETAMINOPHEN EXTRA STRENGTH) 500 MG Oral Cap  Take 1,000 mg by mouth one time. Losartan Potassium 50 MG Oral Tab  Take 1 tablet (50 mg total) by mouth daily.   Qty: 30 tablet Refills: Reason for Therapy: Mobility Dysfunction and Discharge Planning    History related to current admission: Pt underwent L TKA on 8/21/18.      Problem List  Active Problems:    Degenerative joint disease of knee, left      Past Medical History  Past Medical H BALANCE  Static Sitting: Good  Dynamic Sitting: Good  Static Standing: Fair -  Dynamic Standing: Poor +                                                                       ACTIVITY TOLERANCE  O2 Saturation: 95%  Room air  No shortness of breath    AM-P Patient End of Session: Up in chair;Needs met;RN aware of session/findings;Call light within reach; All patient questions and concerns addressed; Family present    ASSESSMENT   Patient is a 48year old male admitted on 8/21/2018 for L TKA.   In this PT evalua Goal #6        Goal Comments: Goals established on 8/21/2018[NL.1]     Attribution Key    NL. 1 - July Rondon, PT on 8/21/2018  2:53 PM                        Occupational Therapy Notes (last 72 hours) (Notes from 8/19/2018  2:21 PM through 8/22/2018 Toilet and Equipment: Standard height toilet; Toilet riser  Shower/Tub and Equipment: Walk-in shower (built-in seat)  Other Equipment: None    Occupation/Status: Recently retired from security job     Drives: Yes  Patient Regularly Uses: [employer] Education on knee/surgical precautions and incorporation into ADLs; patient required minimal cueing to follow throughout session; education on bed mobility, performed Mod I; education on LB dressing techniques/equipment, performed LB dressing to knees SBA Clinical Decision Making  LOW - Analysis of occupational profile, problem-focused assessments, limited treatment options    Overall Complexity  LOW     OT Discharge Recommendations: Home (with family assist)  OT Device Recommendations: None    PLAN   Patie

## (undated) NOTE — Clinical Note
05/04/2017          Date: 5/3/2017      Patient Name: Jeremy Higgins            To Whom it may concern:     This letter has been written at the patient's request. The above patient was seen at the [facility] for treatment of a medical con

## (undated) NOTE — LETTER
08/25/17    Eddie Lassiter      To Whom It May Concern:     This letter has been written at the patient's request. The above patient was seen at [facility] for treatment of a medical condition from Diverticulitis    The patient may return to work/

## (undated) NOTE — Clinical Note
I had the pleasure of seeing Connie Acuña on 11/26/2018. Please see my attached note. Claudette Holguin MD FACSEMG--Surgery

## (undated) NOTE — MR AVS SNAPSHOT
[facility]  [address] [facility] [address]  [phone]               Thank you for choosing us for your health care visit with Theo Bryant MD.  We are glad to serve you and happy to provide you with this summary o Losartan Potassium 50 MG Tabs   Take 1 tablet (50 mg total) by mouth daily. Commonly known as:  COZAAR           MULTIVITAL Tabs   Take 1 tablet by mouth daily. TraZODone HCl 100 MG Tabs   Take 1 tablet (100 mg total) by mouth nightly. [address] [facility]   Phone:  [phone]   Fax:  [phone]       Comment:  Kojo Vásquez MD     Specialties     Gastroenterology,     Hepatology    Schedule an Appointment     [phone]          William Nina MD UROLOGY - EXTERNAL [id number] CPT(R)]  Order #:  [id number]                  **REFERRAL REQUEST**    Your physician has referred you to a specialist.  Your physician or the clinic staff will provide you with the phone number you should call to schedule your a

## (undated) NOTE — LETTER
OUTSIDE TESTING RESULT REQUEST     IMPORTANT: FOR YOUR IMMEDIATE ATTENTION    Please FAX all test results listed below to: [phone]       * * * * If testing is NOT complete, arrange with patient A.S.A.P. * * * *      Patient Name: Dandre Nevarez

## (undated) NOTE — LETTER
Date: 2/2/2024    Patient Name: Sebastian Hernandez          To Whom it may concern:    This letter has been written at the patient's request. The above patient was seen at the [facility] for treatment of a medical condition.    This patient should be excused from attending work on 2/2/24.        Sincerely,    KESHAV Winchester

## (undated) NOTE — LETTER
[address]  [phone]          Date: 2/19/2020      Patient Name: Jamir Seo            To Whom it may concern:     The above patient was seen at th

## (undated) NOTE — ED AVS SNAPSHOT
Tushar Mishra   MRN: [id number]    Department:  [facility] Emergency Department   Date of Visit:  10/6/2018           Disclosure     Insurance plans vary and the physician(s) referred by the ER may not be covered by your plan.  Please contact tell this physician (or your personal doctor if your instructions are to return to your personal doctor) about any new or lasting problems. The primary care or specialist physician will see patients referred from the [facility] Emergency Department.  Shelton Lombard

## (undated) NOTE — MR AVS SNAPSHOT
[facility]  [address] [facility] [address]  [phone]               Thank you for choosing us for your health care visit with Cherie Foy MD.  We are glad to serve you and happy to provide you with this summary o you how much medicine to take. If your doctor wants you to stop the medicine, the dose may be slowly lowered over time to avoid any side effects. Talk to your pediatrician regarding the use of this medicine in children. Special care may be needed.   What s doses without advice. Where should I keep my medicine? Keep out of the reach of children. Store at room temperature between 15 and 30 degrees C (59 and 86 degrees F). Protect from light. Keep container tightly closed.  Throw away any unused medicine afte NOTE:This sheet is a summary. It may not cover all possible information. If you have questions about this medicine, talk to your doctor, pharmacist, or health care provider.  Copyright© 2016 [employer]             Allergies as of May 09, 2017     Erythro Phone:  [phone]    - predniSONE 20 MG Tabs            MyChart     Visit MyChart  You can access your MyChart to more actively manage your health care and view more details from this visit by going to [email or web address].   If you've recently

## (undated) NOTE — LETTER
[facility] IN [place]  [address] [address]  Dept: [phone]  Dept Fax: [phone]      May 11, 2017    Patient: Jane Gee   Date of Visit: 5/11/2017       To Whom It May Concern:    Dm Rebollar was seen and treate

## (undated) NOTE — Clinical Note
I had the pleasure of seeing Natalie Vicente on 7/19/2021. Please see my attached note.     Clarissa Allen MD FACS  EMG--Surgery

## (undated) NOTE — Clinical Note
[address] [facility], [facility] [address]  [phone]        Date: 5/9/2017      Patient Name: Arline Favorite            To Whom it may concern:     This letter has been written at the [facility]

## (undated) NOTE — LETTER
2021    Return to School / Work    Name: Enrique Mims        : 2/15/1965    To Whom It May Concern,    Enrique Mims had surgery on 2021 and is:    Able to return to school / work without restrictions on 2021.     Comments:

## (undated) NOTE — LETTER
[address] [place] [address], [facility] [address]  [phone]        Date: 8/10/2017      Patient Name: Katarzyna Cuevas            To Whom it may concern:     This letter has been written at the pa

## (undated) NOTE — ED AVS SNAPSHOT
THE [facility] in [facility] [address] [facility] [address] [address]    Phone:  [phone]    Fax:  [address]   MRN: [id number]    Department:  THE [facility] in [place] ACUTE [facility]   Date of Visit:  5/11/2017 [address] [place] [address], [address] [place] [address]     Phone:  [phone]    - levofloxacin 750 MG Tabs              Discharge Instructions       Continue prednisone. Continue albuterol inhaler. Stop Bactrim. Switch to Levaquin.     Discharge Referen to your personal doctor) about any new or lasting problems. The primary care or specialist physician will see patients referred from the [facility]. Follow-up care is at the discretion of that Physician.     IF THERE IS ANY CHANGE OR WORSENING O - If you have concerns related to behavioral health issues or thoughts of harming yourself, contact [address] [place] [address] at [phone].     - If you don’t have insurance, Fady Hodgson has partnered with Patient [employer] [place]